# Patient Record
Sex: FEMALE | Race: WHITE | NOT HISPANIC OR LATINO | Employment: FULL TIME | URBAN - METROPOLITAN AREA
[De-identification: names, ages, dates, MRNs, and addresses within clinical notes are randomized per-mention and may not be internally consistent; named-entity substitution may affect disease eponyms.]

---

## 2023-12-13 ENCOUNTER — APPOINTMENT (OUTPATIENT)
Dept: RADIOLOGY | Facility: HOSPITAL | Age: 58
DRG: 308 | End: 2023-12-13
Payer: COMMERCIAL

## 2023-12-13 ENCOUNTER — HOSPITAL ENCOUNTER (INPATIENT)
Facility: HOSPITAL | Age: 58
LOS: 1 days | Discharge: HOME | DRG: 308 | End: 2023-12-15
Attending: STUDENT IN AN ORGANIZED HEALTH CARE EDUCATION/TRAINING PROGRAM | Admitting: INTERNAL MEDICINE
Payer: COMMERCIAL

## 2023-12-13 DIAGNOSIS — I63.532 CEREBROVASCULAR ACCIDENT (CVA) DUE TO OCCLUSION OF LEFT POSTERIOR CEREBRAL ARTERY (MULTI): ICD-10-CM

## 2023-12-13 DIAGNOSIS — I48.91 NEW ONSET ATRIAL FIBRILLATION (MULTI): Primary | ICD-10-CM

## 2023-12-13 DIAGNOSIS — R07.9 CHEST PAIN, UNSPECIFIED TYPE: ICD-10-CM

## 2023-12-13 DIAGNOSIS — I48.91 NEW ONSET A-FIB (MULTI): ICD-10-CM

## 2023-12-13 PROBLEM — R79.89 ELEVATED BRAIN NATRIURETIC PEPTIDE (BNP) LEVEL: Status: ACTIVE | Noted: 2023-12-13

## 2023-12-13 PROBLEM — R03.0 PRE-HYPERTENSION: Status: ACTIVE | Noted: 2023-12-13

## 2023-12-13 PROBLEM — E87.1 HYPONATREMIA: Status: ACTIVE | Noted: 2023-12-13

## 2023-12-13 PROBLEM — I10 HTN (HYPERTENSION): Status: ACTIVE | Noted: 2023-12-13

## 2023-12-13 PROBLEM — R74.8 ELEVATED LIVER ENZYMES: Status: ACTIVE | Noted: 2023-12-13

## 2023-12-13 LAB
ALBUMIN SERPL BCP-MCNC: 4.4 G/DL (ref 3.4–5)
ALP SERPL-CCNC: 93 U/L (ref 33–136)
ALT SERPL W P-5'-P-CCNC: 11 U/L (ref 7–45)
ANION GAP SERPL CALC-SCNC: 18 MMOL/L (ref 10–20)
AST SERPL W P-5'-P-CCNC: 22 U/L (ref 9–39)
BASOPHILS # BLD AUTO: 0.08 X10*3/UL (ref 0–0.1)
BASOPHILS NFR BLD AUTO: 0.9 %
BILIRUB SERPL-MCNC: 0.8 MG/DL (ref 0–1.2)
BNP SERPL-MCNC: 321 PG/ML (ref 0–99)
BUN SERPL-MCNC: 10 MG/DL (ref 6–23)
CALCIUM SERPL-MCNC: 9.1 MG/DL (ref 8.6–10.3)
CARDIAC TROPONIN I PNL SERPL HS: 6 NG/L (ref 0–13)
CARDIAC TROPONIN I PNL SERPL HS: 7 NG/L (ref 0–13)
CHLORIDE SERPL-SCNC: 95 MMOL/L (ref 98–107)
CO2 SERPL-SCNC: 22 MMOL/L (ref 21–32)
CREAT SERPL-MCNC: 0.59 MG/DL (ref 0.5–1.05)
EOSINOPHIL # BLD AUTO: 0.11 X10*3/UL (ref 0–0.7)
EOSINOPHIL NFR BLD AUTO: 1.2 %
ERYTHROCYTE [DISTWIDTH] IN BLOOD BY AUTOMATED COUNT: 12.2 % (ref 11.5–14.5)
GFR SERPL CREATININE-BSD FRML MDRD: >90 ML/MIN/1.73M*2
GLUCOSE SERPL-MCNC: 85 MG/DL (ref 74–99)
HCT VFR BLD AUTO: 54.8 % (ref 36–46)
HGB BLD-MCNC: 18.4 G/DL (ref 12–16)
IMM GRANULOCYTES # BLD AUTO: 0.03 X10*3/UL (ref 0–0.7)
IMM GRANULOCYTES NFR BLD AUTO: 0.3 % (ref 0–0.9)
INR PPP: 1.1 (ref 0.9–1.1)
LYMPHOCYTES # BLD AUTO: 2.39 X10*3/UL (ref 1.2–4.8)
LYMPHOCYTES NFR BLD AUTO: 25.4 %
MCH RBC QN AUTO: 32 PG (ref 26–34)
MCHC RBC AUTO-ENTMCNC: 33.6 G/DL (ref 32–36)
MCV RBC AUTO: 95 FL (ref 80–100)
MONOCYTES # BLD AUTO: 0.97 X10*3/UL (ref 0.1–1)
MONOCYTES NFR BLD AUTO: 10.3 %
NEUTROPHILS # BLD AUTO: 5.83 X10*3/UL (ref 1.2–7.7)
NEUTROPHILS NFR BLD AUTO: 61.9 %
NRBC BLD-RTO: 0 /100 WBCS (ref 0–0)
PLATELET # BLD AUTO: 304 X10*3/UL (ref 150–450)
POTASSIUM SERPL-SCNC: 3.7 MMOL/L (ref 3.5–5.3)
PROT SERPL-MCNC: 7.9 G/DL (ref 6.4–8.2)
PROTHROMBIN TIME: 12 SECONDS (ref 9.8–12.8)
RBC # BLD AUTO: 5.75 X10*6/UL (ref 4–5.2)
SODIUM SERPL-SCNC: 131 MMOL/L (ref 136–145)
WBC # BLD AUTO: 9.4 X10*3/UL (ref 4.4–11.3)

## 2023-12-13 PROCEDURE — 96372 THER/PROPH/DIAG INJ SC/IM: CPT

## 2023-12-13 PROCEDURE — 84484 ASSAY OF TROPONIN QUANT: CPT | Performed by: STUDENT IN AN ORGANIZED HEALTH CARE EDUCATION/TRAINING PROGRAM

## 2023-12-13 PROCEDURE — 2550000001 HC RX 255 CONTRASTS: Performed by: STUDENT IN AN ORGANIZED HEALTH CARE EDUCATION/TRAINING PROGRAM

## 2023-12-13 PROCEDURE — 99285 EMERGENCY DEPT VISIT HI MDM: CPT | Mod: 25,27 | Performed by: STUDENT IN AN ORGANIZED HEALTH CARE EDUCATION/TRAINING PROGRAM

## 2023-12-13 PROCEDURE — 36415 COLL VENOUS BLD VENIPUNCTURE: CPT | Performed by: NURSE PRACTITIONER

## 2023-12-13 PROCEDURE — 2500000004 HC RX 250 GENERAL PHARMACY W/ HCPCS (ALT 636 FOR OP/ED): Performed by: STUDENT IN AN ORGANIZED HEALTH CARE EDUCATION/TRAINING PROGRAM

## 2023-12-13 PROCEDURE — 71275 CT ANGIOGRAPHY CHEST: CPT | Performed by: RADIOLOGY

## 2023-12-13 PROCEDURE — 85025 COMPLETE CBC W/AUTO DIFF WBC: CPT | Performed by: STUDENT IN AN ORGANIZED HEALTH CARE EDUCATION/TRAINING PROGRAM

## 2023-12-13 PROCEDURE — G0378 HOSPITAL OBSERVATION PER HR: HCPCS

## 2023-12-13 PROCEDURE — 99285 EMERGENCY DEPT VISIT HI MDM: CPT | Performed by: STUDENT IN AN ORGANIZED HEALTH CARE EDUCATION/TRAINING PROGRAM

## 2023-12-13 PROCEDURE — 71045 X-RAY EXAM CHEST 1 VIEW: CPT | Performed by: RADIOLOGY

## 2023-12-13 PROCEDURE — 71275 CT ANGIOGRAPHY CHEST: CPT

## 2023-12-13 PROCEDURE — 83880 ASSAY OF NATRIURETIC PEPTIDE: CPT | Performed by: STUDENT IN AN ORGANIZED HEALTH CARE EDUCATION/TRAINING PROGRAM

## 2023-12-13 PROCEDURE — 85610 PROTHROMBIN TIME: CPT | Performed by: STUDENT IN AN ORGANIZED HEALTH CARE EDUCATION/TRAINING PROGRAM

## 2023-12-13 PROCEDURE — 80053 COMPREHEN METABOLIC PANEL: CPT | Performed by: STUDENT IN AN ORGANIZED HEALTH CARE EDUCATION/TRAINING PROGRAM

## 2023-12-13 PROCEDURE — 36415 COLL VENOUS BLD VENIPUNCTURE: CPT | Performed by: STUDENT IN AN ORGANIZED HEALTH CARE EDUCATION/TRAINING PROGRAM

## 2023-12-13 PROCEDURE — 71045 X-RAY EXAM CHEST 1 VIEW: CPT | Mod: FY

## 2023-12-13 PROCEDURE — 2500000004 HC RX 250 GENERAL PHARMACY W/ HCPCS (ALT 636 FOR OP/ED): Performed by: NURSE PRACTITIONER

## 2023-12-13 PROCEDURE — 84484 ASSAY OF TROPONIN QUANT: CPT | Performed by: NURSE PRACTITIONER

## 2023-12-13 RX ORDER — SODIUM CHLORIDE 9 MG/ML
75 INJECTION, SOLUTION INTRAVENOUS CONTINUOUS
Status: DISCONTINUED | OUTPATIENT
Start: 2023-12-13 | End: 2023-12-14

## 2023-12-13 RX ORDER — TALC
3 POWDER (GRAM) TOPICAL DAILY PRN
Status: DISCONTINUED | OUTPATIENT
Start: 2023-12-13 | End: 2023-12-15 | Stop reason: HOSPADM

## 2023-12-13 RX ORDER — POLYETHYLENE GLYCOL 3350 17 G/17G
17 POWDER, FOR SOLUTION ORAL DAILY PRN
Status: DISCONTINUED | OUTPATIENT
Start: 2023-12-13 | End: 2023-12-15 | Stop reason: HOSPADM

## 2023-12-13 RX ORDER — ENOXAPARIN SODIUM 100 MG/ML
1 INJECTION SUBCUTANEOUS EVERY 12 HOURS
Status: DISCONTINUED | OUTPATIENT
Start: 2023-12-13 | End: 2023-12-15

## 2023-12-13 RX ADMIN — ENOXAPARIN SODIUM 70 MG: 100 INJECTION SUBCUTANEOUS at 19:45

## 2023-12-13 RX ADMIN — IOHEXOL 54 ML: 350 INJECTION, SOLUTION INTRAVENOUS at 19:14

## 2023-12-13 RX ADMIN — SODIUM CHLORIDE 75 ML/HR: 9 INJECTION, SOLUTION INTRAVENOUS at 23:34

## 2023-12-13 SDOH — SOCIAL STABILITY: SOCIAL INSECURITY: DO YOU FEEL ANYONE HAS EXPLOITED OR TAKEN ADVANTAGE OF YOU FINANCIALLY OR OF YOUR PERSONAL PROPERTY?: NO

## 2023-12-13 SDOH — SOCIAL STABILITY: SOCIAL INSECURITY: ARE YOU OR HAVE YOU BEEN THREATENED OR ABUSED PHYSICALLY, EMOTIONALLY, OR SEXUALLY BY ANYONE?: NO

## 2023-12-13 SDOH — SOCIAL STABILITY: SOCIAL INSECURITY: HAS ANYONE EVER THREATENED TO HURT YOUR FAMILY OR YOUR PETS?: NO

## 2023-12-13 SDOH — SOCIAL STABILITY: SOCIAL INSECURITY: WERE YOU ABLE TO COMPLETE ALL THE BEHAVIORAL HEALTH SCREENINGS?: YES

## 2023-12-13 SDOH — SOCIAL STABILITY: SOCIAL INSECURITY: DO YOU FEEL UNSAFE GOING BACK TO THE PLACE WHERE YOU ARE LIVING?: NO

## 2023-12-13 SDOH — SOCIAL STABILITY: SOCIAL INSECURITY: HAVE YOU HAD THOUGHTS OF HARMING ANYONE ELSE?: NO

## 2023-12-13 SDOH — SOCIAL STABILITY: SOCIAL INSECURITY: ARE THERE ANY APPARENT SIGNS OF INJURIES/BEHAVIORS THAT COULD BE RELATED TO ABUSE/NEGLECT?: NO

## 2023-12-13 SDOH — SOCIAL STABILITY: SOCIAL INSECURITY: ABUSE: ADULT

## 2023-12-13 SDOH — SOCIAL STABILITY: SOCIAL INSECURITY: DOES ANYONE TRY TO KEEP YOU FROM HAVING/CONTACTING OTHER FRIENDS OR DOING THINGS OUTSIDE YOUR HOME?: NO

## 2023-12-13 ASSESSMENT — ACTIVITIES OF DAILY LIVING (ADL)
LACK_OF_TRANSPORTATION: NO
ADEQUATE_TO_COMPLETE_ADL: YES
WALKS IN HOME: INDEPENDENT
HEARING - LEFT EAR: FUNCTIONAL
PATIENT'S MEMORY ADEQUATE TO SAFELY COMPLETE DAILY ACTIVITIES?: YES
BATHING: INDEPENDENT
JUDGMENT_ADEQUATE_SAFELY_COMPLETE_DAILY_ACTIVITIES: YES
FEEDING YOURSELF: INDEPENDENT
DRESSING YOURSELF: INDEPENDENT
HEARING - RIGHT EAR: FUNCTIONAL
TOILETING: INDEPENDENT
GROOMING: INDEPENDENT

## 2023-12-13 ASSESSMENT — PAIN SCALES - GENERAL
PAINLEVEL_OUTOF10: 4
PAINLEVEL_OUTOF10: 0 - NO PAIN
PAINLEVEL_OUTOF10: 3

## 2023-12-13 ASSESSMENT — COGNITIVE AND FUNCTIONAL STATUS - GENERAL
MOBILITY SCORE: 24
PATIENT BASELINE BEDBOUND: NO
DAILY ACTIVITIY SCORE: 24

## 2023-12-13 ASSESSMENT — PAIN DESCRIPTION - PAIN TYPE: TYPE: ACUTE PAIN

## 2023-12-13 ASSESSMENT — COLUMBIA-SUICIDE SEVERITY RATING SCALE - C-SSRS
1. IN THE PAST MONTH, HAVE YOU WISHED YOU WERE DEAD OR WISHED YOU COULD GO TO SLEEP AND NOT WAKE UP?: NO
2. HAVE YOU ACTUALLY HAD ANY THOUGHTS OF KILLING YOURSELF?: NO
1. IN THE PAST MONTH, HAVE YOU WISHED YOU WERE DEAD OR WISHED YOU COULD GO TO SLEEP AND NOT WAKE UP?: NO
6. HAVE YOU EVER DONE ANYTHING, STARTED TO DO ANYTHING, OR PREPARED TO DO ANYTHING TO END YOUR LIFE?: NO
2. HAVE YOU ACTUALLY HAD ANY THOUGHTS OF KILLING YOURSELF?: NO
6. HAVE YOU EVER DONE ANYTHING, STARTED TO DO ANYTHING, OR PREPARED TO DO ANYTHING TO END YOUR LIFE?: NO

## 2023-12-13 ASSESSMENT — PAIN - FUNCTIONAL ASSESSMENT
PAIN_FUNCTIONAL_ASSESSMENT: 0-10

## 2023-12-13 ASSESSMENT — PAIN DESCRIPTION - LOCATION: LOCATION: HEAD

## 2023-12-13 ASSESSMENT — LIFESTYLE VARIABLES
PRESCIPTION_ABUSE_PAST_12_MONTHS: NO
EVER FELT BAD OR GUILTY ABOUT YOUR DRINKING: NO
AUDIT-C TOTAL SCORE: 3
REASON UNABLE TO ASSESS: NO
AUDIT TOTAL SCORE: 3
HOW OFTEN DURING THE LAST YEAR HAVE YOU FAILED TO DO WHAT WAS NORMALLY EXPECTED FROM YOU BECAUSE OF DRINKING: NEVER
HOW OFTEN DURING THE LAST YEAR HAVE YOU BEEN UNABLE TO REMEMBER WHAT HAPPENED THE NIGHT BEFORE BECAUSE YOU HAD BEEN DRINKING: NEVER
HOW OFTEN DO YOU HAVE A DRINK CONTAINING ALCOHOL: 2-3 TIMES A WEEK
HAVE YOU OR SOMEONE ELSE BEEN INJURED AS A RESULT OF YOUR DRINKING: NO
HOW OFTEN DURING THE LAST YEAR HAVE YOU HAD A FEELING OF GUILT OR REMORSE AFTER DRINKING: NEVER
HOW OFTEN DO YOU HAVE 6 OR MORE DRINKS ON ONE OCCASION: NEVER
AUDIT TOTAL SCORE: 0
EVER HAD A DRINK FIRST THING IN THE MORNING TO STEADY YOUR NERVES TO GET RID OF A HANGOVER: NO
SUBSTANCE_ABUSE_PAST_12_MONTHS: NO
HAVE YOU EVER FELT YOU SHOULD CUT DOWN ON YOUR DRINKING: NO
HOW OFTEN DURING THE LAST YEAR HAVE YOU FOUND THAT YOU WERE NOT ABLE TO STOP DRINKING ONCE YOU HAD STARTED: NEVER
HOW OFTEN DURING THE LAST YEAR HAVE YOU NEEDED AN ALCOHOLIC DRINK FIRST THING IN THE MORNING TO GET YOURSELF GOING AFTER A NIGHT OF HEAVY DRINKING: NEVER
SKIP TO QUESTIONS 9-10: 1
AUDIT-C TOTAL SCORE: 3
HAVE PEOPLE ANNOYED YOU BY CRITICIZING YOUR DRINKING: NO
HOW MANY STANDARD DRINKS CONTAINING ALCOHOL DO YOU HAVE ON A TYPICAL DAY: 1 OR 2
HAS A RELATIVE, FRIEND, DOCTOR, OR ANOTHER HEALTH PROFESSIONAL EXPRESSED CONCERN ABOUT YOUR DRINKING OR SUGGESTED YOU CUT DOWN: NO

## 2023-12-13 ASSESSMENT — PAIN DESCRIPTION - DESCRIPTORS: DESCRIPTORS: PRESSURE

## 2023-12-13 ASSESSMENT — PATIENT HEALTH QUESTIONNAIRE - PHQ9
2. FEELING DOWN, DEPRESSED OR HOPELESS: NOT AT ALL
SUM OF ALL RESPONSES TO PHQ9 QUESTIONS 1 & 2: 0
1. LITTLE INTEREST OR PLEASURE IN DOING THINGS: NOT AT ALL

## 2023-12-13 ASSESSMENT — PAIN DESCRIPTION - ONSET: ONSET: GRADUAL

## 2023-12-13 ASSESSMENT — PAIN DESCRIPTION - PROGRESSION: CLINICAL_PROGRESSION: NOT CHANGED

## 2023-12-13 ASSESSMENT — PAIN DESCRIPTION - FREQUENCY: FREQUENCY: INTERMITTENT

## 2023-12-14 ENCOUNTER — APPOINTMENT (OUTPATIENT)
Dept: RADIOLOGY | Facility: HOSPITAL | Age: 58
DRG: 308 | End: 2023-12-14
Payer: COMMERCIAL

## 2023-12-14 ENCOUNTER — APPOINTMENT (OUTPATIENT)
Dept: CARDIOLOGY | Facility: HOSPITAL | Age: 58
DRG: 308 | End: 2023-12-14
Payer: COMMERCIAL

## 2023-12-14 PROBLEM — F17.200 NICOTINE DEPENDENCE: Status: ACTIVE | Noted: 2023-12-14

## 2023-12-14 PROBLEM — I48.91 NEW ONSET ATRIAL FIBRILLATION (MULTI): Status: ACTIVE | Noted: 2023-12-14

## 2023-12-14 PROBLEM — H02.59 EXCESSIVE INVOLUNTARY BLINKING: Status: ACTIVE | Noted: 2023-12-14

## 2023-12-14 PROBLEM — R22.0 MASS OF SUBMANDIBULAR REGION: Status: ACTIVE | Noted: 2023-12-14

## 2023-12-14 PROBLEM — H53.9 VISUAL DISTURBANCE: Status: ACTIVE | Noted: 2023-12-14

## 2023-12-14 PROBLEM — R51.9 HEADACHE: Status: ACTIVE | Noted: 2023-12-14

## 2023-12-14 LAB
ANION GAP SERPL CALC-SCNC: 13 MMOL/L (ref 10–20)
AORTIC VALVE PEAK VELOCITY: 1.01
AV PEAK GRADIENT: 4.1
AVA (PEAK VEL): 2.29
BUN SERPL-MCNC: 10 MG/DL (ref 6–23)
CALCIUM SERPL-MCNC: 8.7 MG/DL (ref 8.6–10.3)
CHLORIDE SERPL-SCNC: 104 MMOL/L (ref 98–107)
CHOLEST SERPL-MCNC: 148 MG/DL (ref 0–199)
CHOLESTEROL/HDL RATIO: 3.9
CO2 SERPL-SCNC: 23 MMOL/L (ref 21–32)
CREAT SERPL-MCNC: 0.56 MG/DL (ref 0.5–1.05)
EJECTION FRACTION APICAL 4 CHAMBER: 66.4
EJECTION FRACTION: 61
ERYTHROCYTE [DISTWIDTH] IN BLOOD BY AUTOMATED COUNT: 12.3 % (ref 11.5–14.5)
EST. AVERAGE GLUCOSE BLD GHB EST-MCNC: 97 MG/DL
GFR SERPL CREATININE-BSD FRML MDRD: >90 ML/MIN/1.73M*2
GLUCOSE SERPL-MCNC: 81 MG/DL (ref 74–99)
HBA1C MFR BLD: 5 %
HCT VFR BLD AUTO: 49.3 % (ref 36–46)
HDLC SERPL-MCNC: 38.3 MG/DL
HGB BLD-MCNC: 16.7 G/DL (ref 12–16)
LDLC SERPL CALC-MCNC: 93 MG/DL
LEFT VENTRICLE INTERNAL DIMENSION DIASTOLE: 4.5 (ref 3.5–6)
LEFT VENTRICULAR OUTFLOW TRACT DIAMETER: 2.1
MAGNESIUM SERPL-MCNC: 2.14 MG/DL (ref 1.6–2.4)
MCH RBC QN AUTO: 32.1 PG (ref 26–34)
MCHC RBC AUTO-ENTMCNC: 33.9 G/DL (ref 32–36)
MCV RBC AUTO: 95 FL (ref 80–100)
MITRAL VALVE E/E' RATIO: 12.07
NON HDL CHOLESTEROL: 110 MG/DL (ref 0–149)
NRBC BLD-RTO: 0 /100 WBCS (ref 0–0)
PHOSPHATE SERPL-MCNC: 4.2 MG/DL (ref 2.5–4.9)
PLATELET # BLD AUTO: 251 X10*3/UL (ref 150–450)
POTASSIUM SERPL-SCNC: 4 MMOL/L (ref 3.5–5.3)
RBC # BLD AUTO: 5.2 X10*6/UL (ref 4–5.2)
RIGHT VENTRICLE FREE WALL PEAK S': 7.07
RIGHT VENTRICLE PEAK SYSTOLIC PRESSURE: 19.6
SODIUM SERPL-SCNC: 136 MMOL/L (ref 136–145)
T4 FREE SERPL-MCNC: 1.25 NG/DL (ref 0.61–1.12)
TRICUSPID ANNULAR PLANE SYSTOLIC EXCURSION: 1.6
TRIGL SERPL-MCNC: 82 MG/DL (ref 0–149)
TSH SERPL-ACNC: 1.95 MIU/L (ref 0.44–3.98)
VLDL: 16 MG/DL (ref 0–40)
WBC # BLD AUTO: 6.8 X10*3/UL (ref 4.4–11.3)

## 2023-12-14 PROCEDURE — 83036 HEMOGLOBIN GLYCOSYLATED A1C: CPT | Mod: STJLAB | Performed by: NURSE PRACTITIONER

## 2023-12-14 PROCEDURE — 70450 CT HEAD/BRAIN W/O DYE: CPT

## 2023-12-14 PROCEDURE — 93306 TTE W/DOPPLER COMPLETE: CPT | Performed by: INTERNAL MEDICINE

## 2023-12-14 PROCEDURE — 83735 ASSAY OF MAGNESIUM: CPT | Performed by: NURSE PRACTITIONER

## 2023-12-14 PROCEDURE — 99223 1ST HOSP IP/OBS HIGH 75: CPT | Performed by: NURSE PRACTITIONER

## 2023-12-14 PROCEDURE — 84100 ASSAY OF PHOSPHORUS: CPT | Performed by: NURSE PRACTITIONER

## 2023-12-14 PROCEDURE — 70551 MRI BRAIN STEM W/O DYE: CPT

## 2023-12-14 PROCEDURE — 70547 MR ANGIOGRAPHY NECK W/O DYE: CPT | Performed by: RADIOLOGY

## 2023-12-14 PROCEDURE — 93306 TTE W/DOPPLER COMPLETE: CPT

## 2023-12-14 PROCEDURE — 99223 1ST HOSP IP/OBS HIGH 75: CPT

## 2023-12-14 PROCEDURE — 2060000001 HC INTERMEDIATE ICU ROOM DAILY

## 2023-12-14 PROCEDURE — 84439 ASSAY OF FREE THYROXINE: CPT | Performed by: NURSE PRACTITIONER

## 2023-12-14 PROCEDURE — 70450 CT HEAD/BRAIN W/O DYE: CPT | Performed by: RADIOLOGY

## 2023-12-14 PROCEDURE — 70547 MR ANGIOGRAPHY NECK W/O DYE: CPT

## 2023-12-14 PROCEDURE — 70544 MR ANGIOGRAPHY HEAD W/O DYE: CPT

## 2023-12-14 PROCEDURE — 80061 LIPID PANEL: CPT | Performed by: NURSE PRACTITIONER

## 2023-12-14 PROCEDURE — 84443 ASSAY THYROID STIM HORMONE: CPT | Performed by: NURSE PRACTITIONER

## 2023-12-14 PROCEDURE — 85027 COMPLETE CBC AUTOMATED: CPT | Performed by: NURSE PRACTITIONER

## 2023-12-14 PROCEDURE — 99223 1ST HOSP IP/OBS HIGH 75: CPT | Performed by: INTERNAL MEDICINE

## 2023-12-14 PROCEDURE — 70544 MR ANGIOGRAPHY HEAD W/O DYE: CPT | Performed by: RADIOLOGY

## 2023-12-14 PROCEDURE — 70551 MRI BRAIN STEM W/O DYE: CPT | Performed by: RADIOLOGY

## 2023-12-14 PROCEDURE — 36415 COLL VENOUS BLD VENIPUNCTURE: CPT | Performed by: NURSE PRACTITIONER

## 2023-12-14 PROCEDURE — 80048 BASIC METABOLIC PNL TOTAL CA: CPT | Performed by: NURSE PRACTITIONER

## 2023-12-14 PROCEDURE — 2500000001 HC RX 250 WO HCPCS SELF ADMINISTERED DRUGS (ALT 637 FOR MEDICARE OP)

## 2023-12-14 PROCEDURE — 93005 ELECTROCARDIOGRAM TRACING: CPT

## 2023-12-14 RX ORDER — IBUPROFEN 200 MG
1 TABLET ORAL DAILY
Status: DISCONTINUED | OUTPATIENT
Start: 2023-12-14 | End: 2023-12-15 | Stop reason: HOSPADM

## 2023-12-14 RX ORDER — ATORVASTATIN CALCIUM 40 MG/1
40 TABLET, FILM COATED ORAL NIGHTLY
Status: CANCELLED | OUTPATIENT
Start: 2023-12-14

## 2023-12-14 RX ORDER — ATORVASTATIN CALCIUM 40 MG/1
40 TABLET, FILM COATED ORAL NIGHTLY
Status: DISCONTINUED | OUTPATIENT
Start: 2023-12-14 | End: 2023-12-15 | Stop reason: HOSPADM

## 2023-12-14 RX ADMIN — ATORVASTATIN CALCIUM 40 MG: 40 TABLET, FILM COATED ORAL at 20:55

## 2023-12-14 ASSESSMENT — VISUAL ACUITY: VA_NORMAL: 1

## 2023-12-14 ASSESSMENT — PAIN - FUNCTIONAL ASSESSMENT
PAIN_FUNCTIONAL_ASSESSMENT: 0-10

## 2023-12-14 ASSESSMENT — PAIN SCALES - GENERAL
PAINLEVEL_OUTOF10: 0 - NO PAIN

## 2023-12-14 ASSESSMENT — COGNITIVE AND FUNCTIONAL STATUS - GENERAL
DAILY ACTIVITIY SCORE: 24
MOBILITY SCORE: 24

## 2023-12-14 NOTE — NURSING NOTE
Report received from [facility].  Pt transferred via  into 2100.  Denies chest pain or sob.  Monitor shows afib 70's.  Oriented to room and unit.  Friend at bedside.  Pt ambulates in room, gait steady.  Call light in reach.

## 2023-12-14 NOTE — ED PROVIDER NOTES
HPI   Chief Complaint   Patient presents with    Irregular Heart Beat    Headache     Pt was sent by  for irregular heart beat, new onset AFIB, HR controlled, denies sob or cp       58-year-old female with past medical history significant for prior DVTs who presents to the emergency department with chest discomfort and a headache.  She states that she went to urgent care yesterday to be evaluated for headache that is been ongoing for the last several days, and she was told to come to the ER there were concerns for something wrong with her heart.  She states that she has some intermittent discomfort, but does not feel like her heart is racing.  She is not on a blood thinner although she has been on 1 in the past for prior DVTs.  Endorses smoking, 1 pack/day.  No difficulty breathing, no new vision changes, no nausea, no vomiting, no diarrhea or constipation, and no belly pain.                          Helen Coma Scale Score: 15                  Patient History   Past Medical History:   Diagnosis Date    Closed fracture of upper end of humerus 11/03/2015    Dry eyes     Elevated liver enzymes 12/13/2023    Intermittent exotropia of right eye     Migraines     Pre-hypertension 12/13/2023    Scoliosis     Shoulder fracture, left     Stroke (CMS/HCC)     CVA/TIA     Past Surgical History:   Procedure Laterality Date    ECTOPIC PREGNANCY SURGERY      EYE SURGERY Right     x2    FRACTURE SURGERY      left wrist    HERNIA REPAIR       Family History   Problem Relation Name Age of Onset    Cancer Mother      Heart attack Father          dies at the age of 50's    No Known Problems Sister      No Known Problems Son      Other (borderline diabetic) Maternal Grandfather      Heart attack Paternal Grandmother      Stroke Paternal Grandfather       Social History     Tobacco Use    Smoking status: Every Day     Packs/day: .5     Types: Cigarettes    Smokeless tobacco: Never   Vaping Use    Vaping Use: Not on file   Substance  Use Topics    Alcohol use: Not Currently     Comment: 3x per week 2-3 cans of beer    Drug use: Never       Physical Exam   ED Triage Vitals [12/13/23 1529]   Temp Heart Rate Resp BP   36 °C (96.8 °F) 82 16 (!) 168/91      SpO2 Temp Source Heart Rate Source Patient Position   99 % Temporal Monitor Sitting      BP Location FiO2 (%)     Left arm --       Physical Exam  Vitals and nursing note reviewed.   Constitutional:       General: She is not in acute distress.     Appearance: She is well-developed.   HENT:      Head: Normocephalic and atraumatic.   Eyes:      Conjunctiva/sclera: Conjunctivae normal.      Comments: Strabismus of right eye   Cardiovascular:      Rate and Rhythm: Normal rate. Rhythm irregular.      Heart sounds: No murmur heard.  Pulmonary:      Effort: Pulmonary effort is normal. No respiratory distress.      Breath sounds: Normal breath sounds.   Abdominal:      Palpations: Abdomen is soft.      Tenderness: There is no abdominal tenderness.   Musculoskeletal:         General: No swelling.      Cervical back: Neck supple.   Skin:     General: Skin is warm and dry.      Capillary Refill: Capillary refill takes less than 2 seconds.   Neurological:      Mental Status: She is alert.   Psychiatric:         Mood and Affect: Mood normal.         ED Course & MDM   Diagnoses as of 12/14/23 0423   New onset atrial fibrillation (CMS/HCC)       Medical Decision Making  History obtained from: Patient    External records reviewed: I reviewed external records including outpatient, PCP records, and prior discharge summaries    ED Course: Cardiac workup for the patient and new onset A-fib.  Given her first dose of therapeutic Lovenox 1 mg/kg.  EKG shows atrial fibrillation with narrow complex conduction.No significant electrolyte derangements, no elevation in troponin, and her TSH is within normal limits.  BNP elevated to over 300, however the patient has no shortness of breath.  She is hemoconcentrated 18.4  hemoglobin.  PE study was negative.  She will be admitted for further evaluation for new onset atrial fibrillation.    I have reviewed this case with the ED attending physician, and the attending agrees with the plan. Patient or family was counselled regarding labs, imaging, likely diagnosis, and plan. All questions were answered.     Teena Cruz DO  PGY-4, emergency medicine    The above documentation was completed with the use of speech recognition software. It may contain dictation errors secondary to limitations of the software.          Procedure  Procedures     Teena Cruz DO  Resident  12/14/23 0427

## 2023-12-14 NOTE — PROGRESS NOTES
Spiritual Care Visit    Clinical Encounter Type  Visited With: Patient  Routine Visit: Introduction  Continue Visiting: No                                            Taxonomy  Intended Effects: Promote sense of peace, Preserve dignity and respect  Methods: Offer spiritual/Yazidi support  Interventions: [place], Share words of hope and inspiration    Patient is a Taoist.  Patient shared she is inactive in her zion because her Jainism closed and she works on Sundays.   was a supportive presence.

## 2023-12-14 NOTE — PROGRESS NOTES
12/14/23 1118   Discharge Planning   Living Arrangements Children   Support Systems Children   Type of Residence Private residence   Home or Post Acute Services None   Patient expects to be discharged to: home   Does the patient need discharge transport arranged? No     Met with patient at bedside. Admitted for new onset Afib. Pt lives with adult son and grandchildren and was independent PTA with no HHC or DME. Pt still working and is able to obtain medications. Plans to return home with no new discharge needs. Family will provide transport.

## 2023-12-14 NOTE — CARE PLAN
Problem: Fall/Injury  Goal: Not fall by end of shift  Outcome: Progressing  Goal: Be free from injury by end of the shift  Outcome: Progressing  Goal: Verbalize understanding of personal risk factors for fall in the hospital  Outcome: Progressing  Goal: Verbalize understanding of risk factor reduction measures to prevent injury from fall in the home  Outcome: Progressing  Goal: Use assistive devices by end of the shift  Outcome: Progressing  Goal: Pace activities to prevent fatigue by end of the shift  Outcome: Progressing     Problem: Pain  Goal: My pain/discomfort is manageable  Outcome: Progressing     Problem: Safety  Goal: Patient will be injury free during hospitalization  Outcome: Progressing  Goal: I will remain free of falls  Outcome: Progressing     Problem: Daily Care  Goal: Daily care needs are met  Outcome: Progressing     Problem: Psychosocial Needs  Goal: Demonstrates ability to cope with hospitalization/illness  Outcome: Progressing  Goal: Collaborate with me, my family, and caregiver to identify my specific goals  Outcome: Progressing  Flowsheets (Taken 12/13/2023 0747)  Cultural Requests During Hospitalization: none  Spiritual Requests During Hospitalization: none     Problem: Discharge Barriers  Goal: My discharge needs are met  Outcome: Progressing   The patient's goals for the shift include rest    The clinical goals for the shift include Remain hemodynamically stable

## 2023-12-14 NOTE — CONSULTS
Reason For Consult  Stroke    History Of Present Illness  Tomasa Gutierres is a 58 y.o. 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    Patient reported that 1 week ago that his last Thursday she started experiencing headaches that were very similar to her usual migraines and it started suddenly after she got out of her shower in the morning.  The headache was bitemporal, throbbing, 5-6/10 in intensity, associated with visual auras and photophobia.  Her usual migraines are relieved with 1-2 low-dose aspirins.  She took 2 low-dose aspirin on Thursday and 2 to low-dose aspirin on Friday however this did not relieve her headache this time.  The headaches gradually slowed down and became intermittent till now.  Out one of her prior to the onset of her headache she had a mechanical fall however did not hit her head but hit her left wrist and left hip.  She was able to get up by herself and continue with her daily activities.  Patient also reported that since last Thursday she feels that her right-sided peripheral vision is a little blurry.  During this entire time patient has a decreased appetite.  However she denied any swallowing issues.  On Tuesday she went to urgent care and was found to be in A-fib and recommended to go to the ER.  She went to ER at [place] however due to long waiting time decided to go to [facility].    Patient denies any loss of consciousness, weakness/numbness/tingling of any of her extremities, bowel or bladder issues.  She was able to walk and continue her daily activities.    Hospital course:  Upon presentation to the hospital patient was afebrile, nontachycardic, blood  pressure initially was 168/91, satting well on room air.  Current blood pressure is 111/74.  EKG was suggestive of atrial fibrillation.  Labs initially patient was mildly hyponatremic of 131, repeat 136.  Lipid panel is within normal limits. A1C 5.0    CT head Noncon 12/13/2023 suggested edema in the left occipital and temporal lobe compatible with acute infarct.  There was some minimal hyperdensity at the site of the infarct, could be laminar necrosis or minimal petechial hemorrhage.  Mild encephalomalacia in the right parietal lobe noted compatible with an old infarct.    After 6 hrs-Repeat CT head noncontrast 12/14/2023 suggested abnormal hypodensity involving the cortex and white matter along the left occipital lobe extending to the left temporal lobe.  Associated mass effect with asymmetric effacement of surrounding sulci.  Findings suggestive of acute to early subacute infarction.  Possible area of petechial hemorrhagic transformation.    Interventions done-pending MRI of the brain and MRA head and neck.  Patient on every 2 hours neurochecks.  Anticoagulation and antiplatelets on hold currently-In the setting of aforementioned hemorrhagic transformation.    Echo 12/14/2023 showed negative bubble study.  No prior cardiac workup seen upon chart review      Past Medical History  Past Medical History:   Diagnosis Date    Closed fracture of upper end of humerus 11/03/2015    Dry eyes     Elevated liver enzymes 12/13/2023    Intermittent exotropia of right eye     Migraines     Pre-hypertension 12/13/2023    Scoliosis     Shoulder fracture, left     Stroke (CMS/HCC)     CVA/TIA     Surgical History  Past Surgical History:   Procedure Laterality Date    ECTOPIC PREGNANCY SURGERY      EYE SURGERY Right     x2    FRACTURE SURGERY      left wrist    HERNIA REPAIR       Social History  Social History     Tobacco Use    Smoking status: Every Day     Packs/day: .5     Types: Cigarettes    Smokeless tobacco: Never    Substance Use Topics    Alcohol use: Not Currently     Comment: 3x per week 2-3 cans of beer    Drug use: Never     Allergies  Azithromycin  No medications prior to admission.       Review of Systems    CONSTITUTIONAL: Denies fever; reports chills  NEURO: Denies difficulty walking, numbness, weakness, tingling, reports headache.   HEENT: Denies sore throat, rhinorrhea, epistaxis, changes in vision.   CARDIO: Denies chest pain, palpitations  PULM: Denies shortness of breath, cough.   GI: Denies abdominal pain, nausea, diarrhea, vomiting, melena, hematochezia.  : Denies painful urination.   MSK: Denies edema; Denies leg swelling  SKIN: Denies rash, lesions.   ENDOCRINE: Denies unexpected weight-loss.   HEME: Denies bleeding disorder.     Neurological Exam  Mental Status  Awake, alert and oriented to person, place and time. Oriented to person, place and time. Recent and remote memory are intact. Speech is normal. Language is fluent with no aphasia. Attention and concentration are normal. Fund of knowledge is appropriate for level of education.    Cranial Nerves  CN II: Visual acuity is normal. Visual fields full to confrontation.  CN III, IV, VI: Extraocular movements intact bilaterally. Normal lids and orbits bilaterally. Pupils equal round and reactive to light bilaterally. R exotropia +, questionable L sided superior quadrantanopia.  CN V: Facial sensation is normal.  CN VII: Flat L nasiolabial fold.  CN VIII: Hearing is normal.  CN IX, X: Palate elevates symmetrically  CN XI: Shoulder shrug strength is normal.  CN XII: Tongue midline without atrophy or fasciculations.    Motor  Normal muscle bulk throughout. Normal muscle tone. No pronator drift.    Sensory  Sensation is intact to light touch, pinprick, vibration and proprioception in all four extremities.    Reflexes                                            Right                      Left  Brachioradialis                    2+                          "2+  Biceps                                 2+                         2+  Triceps                                2+                         2+  Patellar                                2+                         2+  Achilles                                2+                         2+    Coordination  Right: Finger-to-nose normal. Rapid alternating movement normal. Heel-to-shin normal.    Gait  Casual gait is normal including stance, stride, and arm swing.    Constitutional: Well developed,  well appearing adult in no acute distress.   HENT: Normocephalic, atraumatic. Hearing is grossly intact. Nares grossly patent and without discharge. Mucous membranes moist.   NECK: No JVD. Patient moves neck without restriction.   CARDIO: Rhythm regular. Normal rate. No murmur, rub, or gallop. Pulses equal bilaterally in the upper and lower extremity.   PULM: Lungs clear to auscultation in all fields. No wheezes, rales, or rhonchi. No conversational dyspnea. No splinting, stridor, or accessory muscle use.    GI/: Abdomen is soft and non-tender. Normoactive bowel sounds.   EXTREMITIES: No clubbing, cyanosis, or deformity. No lower extremity edema. No tenderness along the deep venous distribution of the bilateral lower extremities  SKIN: Warm and dry. Normal turgor. No rash noted over the area of pain  PSYCH: Mood, affect, and interaction is appropriate to the setting.    Last Recorded Vitals  Blood pressure 111/74, pulse 66, temperature 36.5 °C (97.7 °F), resp. rate 20, height 1.753 m (5' 9\"), weight 72.6 kg (160 lb), SpO2 95 %.    Relevant Results  NIH Stroke Scale  1A. Level of Consciousness: Alert, Keenly Responsive  1B. Ask Month and Age: Both Questions Right  1C. Blink Eyes & Squeeze Hands: Performs Both Tasks  2. Best Gaze: Normal  3. Visual: No Visual Loss  4. Facial Palsy: Left facial droop  5A. Motor - Left Arm: No Drift  5B. Motor - Right Arm: No Drift  6A. Motor - Left Leg: No Drift  6B. Motor - Right Leg: No " Drift  7. Limb Ataxia: Absent  8. Sensory Loss: Normal  9. Best Language: No Aphasia  10. Dysarthria: Normal  11. Extinction and Inattention: No Abnormality  NIH Stroke Scale: 1    Results for orders placed or performed during the hospital encounter of 12/13/23 (from the past 24 hour(s))   CBC with Differential   Result Value Ref Range    WBC 9.4 4.4 - 11.3 x10*3/uL    nRBC 0.0 0.0 - 0.0 /100 WBCs    RBC 5.75 (H) 4.00 - 5.20 x10*6/uL    Hemoglobin 18.4 (H) 12.0 - 16.0 g/dL    Hematocrit 54.8 (H) 36.0 - 46.0 %    MCV 95 80 - 100 fL    MCH 32.0 26.0 - 34.0 pg    MCHC 33.6 32.0 - 36.0 g/dL    RDW 12.2 11.5 - 14.5 %    Platelets 304 150 - 450 x10*3/uL    Neutrophils % 61.9 40.0 - 80.0 %    Immature Granulocytes %, Automated 0.3 0.0 - 0.9 %    Lymphocytes % 25.4 13.0 - 44.0 %    Monocytes % 10.3 2.0 - 10.0 %    Eosinophils % 1.2 0.0 - 6.0 %    Basophils % 0.9 0.0 - 2.0 %    Neutrophils Absolute 5.83 1.20 - 7.70 x10*3/uL    Immature Granulocytes Absolute, Automated 0.03 0.00 - 0.70 x10*3/uL    Lymphocytes Absolute 2.39 1.20 - 4.80 x10*3/uL    Monocytes Absolute 0.97 0.10 - 1.00 x10*3/uL    Eosinophils Absolute 0.11 0.00 - 0.70 x10*3/uL    Basophils Absolute 0.08 0.00 - 0.10 x10*3/uL   Comprehensive Metabolic Panel   Result Value Ref Range    Glucose 85 74 - 99 mg/dL    Sodium 131 (L) 136 - 145 mmol/L    Potassium 3.7 3.5 - 5.3 mmol/L    Chloride 95 (L) 98 - 107 mmol/L    Bicarbonate 22 21 - 32 mmol/L    Anion Gap 18 10 - 20 mmol/L    Urea Nitrogen 10 6 - 23 mg/dL    Creatinine 0.59 0.50 - 1.05 mg/dL    eGFR >90 >60 mL/min/1.73m*2    Calcium 9.1 8.6 - 10.3 mg/dL    Albumin 4.4 3.4 - 5.0 g/dL    Alkaline Phosphatase 93 33 - 136 U/L    Total Protein 7.9 6.4 - 8.2 g/dL    AST 22 9 - 39 U/L    Bilirubin, Total 0.8 0.0 - 1.2 mg/dL    ALT 11 7 - 45 U/L   Brain Natriuretic Peptide   Result Value Ref Range     (H) 0 - 99 pg/mL   Troponin I, High Sensitivity   Result Value Ref Range    Troponin I, High Sensitivity 6 0 - 13  ng/L   Protime-INR   Result Value Ref Range    Protime 12.0 9.8 - 12.8 seconds    INR 1.1 0.9 - 1.1   Troponin I, High Sensitivity   Result Value Ref Range    Troponin I, High Sensitivity 7 0 - 13 ng/L   Magnesium   Result Value Ref Range    Magnesium 2.14 1.60 - 2.40 mg/dL   CBC   Result Value Ref Range    WBC 6.8 4.4 - 11.3 x10*3/uL    nRBC 0.0 0.0 - 0.0 /100 WBCs    RBC 5.20 4.00 - 5.20 x10*6/uL    Hemoglobin 16.7 (H) 12.0 - 16.0 g/dL    Hematocrit 49.3 (H) 36.0 - 46.0 %    MCV 95 80 - 100 fL    MCH 32.1 26.0 - 34.0 pg    MCHC 33.9 32.0 - 36.0 g/dL    RDW 12.3 11.5 - 14.5 %    Platelets 251 150 - 450 x10*3/uL   Basic metabolic panel   Result Value Ref Range    Glucose 81 74 - 99 mg/dL    Sodium 136 136 - 145 mmol/L    Potassium 4.0 3.5 - 5.3 mmol/L    Chloride 104 98 - 107 mmol/L    Bicarbonate 23 21 - 32 mmol/L    Anion Gap 13 10 - 20 mmol/L    Urea Nitrogen 10 6 - 23 mg/dL    Creatinine 0.56 0.50 - 1.05 mg/dL    eGFR >90 >60 mL/min/1.73m*2    Calcium 8.7 8.6 - 10.3 mg/dL   Phosphorus   Result Value Ref Range    Phosphorus 4.2 2.5 - 4.9 mg/dL   Lipid Panel   Result Value Ref Range    Cholesterol 148 0 - 199 mg/dL    HDL-Cholesterol 38.3 mg/dL    Cholesterol/HDL Ratio 3.9     LDL Calculated 93 <=99 mg/dL    VLDL 16 0 - 40 mg/dL    Triglycerides 82 0 - 149 mg/dL    Non HDL Cholesterol 110 0 - 149 mg/dL   TSH   Result Value Ref Range    Thyroid Stimulating Hormone 1.95 0.44 - 3.98 mIU/L   T4, free   Result Value Ref Range    Thyroxine, Free 1.25 (H) 0.61 - 1.12 ng/dL   Transthoracic Echo (TTE) Complete   Result Value Ref Range    AV pk francisco j 1.01     LV biplane EF 61     LVOT diam 2.10     MV avg E/e' ratio 12.07     Tricuspid annular plane systolic excursion 1.6     RV free wall pk S' 7.07     RVSP 19.6     LVIDd 4.50     Aortic Valve Area by Continuity of Peak Velocity 2.29     AV pk grad 4.1     LV A4C EF 66.4       CT head wo IV contrast 12/14/2023    Narrative  Interpreted By:  Heriberto Beltran,  STUDY:  CT HEAD  WO IV CONTRAST;  12/14/2023 8:06 am    INDICATION:  Signs/Symptoms:follow-up from previous CT head d/t infarct and  questionable hemorrhage.    COMPARISON:  12/14/2023 time 1:57 a.m.    ACCESSION NUMBER(S):  [id number]    ORDERING CLINICIAN:  JOSH WARE    TECHNIQUE:  Axial CT images of the head were obtained without intravenous  contrast administration.    FINDINGS:  There is again evidence of abnormal hypodensity which appears to  involve cortex and underlying white matter along the inferior left  occipital lobe extending anteriorly into the posteroinferior left  temporal lobe. There is associated mass effect with asymmetric  effacement of surrounding sulci. The findings remain suspicious for  an area of acute to early subacute infarction. There is again  evidence of a similar small amount of hyperdensity associated with  the area of abnormal hypodensity raising the possibility of a minimal  amount of associated petechial hemorrhagic transformation.    An area of encephalomalacia/gliosis is again noted within the right  parietal lobe.    Nonspecific white matter changes are again noted within cerebral  hemispheres bilaterally which while nonspecific, given the patient's  age, likely represent sequelae of small-vessel ischemic change.    The ventricular system remains nondilated.    There is no midline shift.    The visualized paranasal sinuses and mastoid air cells are clear.    No acute fracture is noted.    Impression  There is again evidence of abnormal hypodensity which appears to  involve cortex and underlying white matter along the inferior left  occipital lobe extending anteriorly into the posteroinferior left  temporal lobe. There is associated mass effect with asymmetric  effacement of surrounding sulci. The findings remain suspicious for  an area of acute to early subacute infarction. There is again  evidence of a similar small amount of hyperdensity associated with  the area of abnormal hypodensity  raising the possibility of a minimal  amount of associated petechial hemorrhagic transformation.    An area of encephalomalacia/gliosis is again noted within the right  parietal lobe.    Nonspecific white matter changes are again noted within cerebral  hemispheres bilaterally which while nonspecific, given the patient's  age, likely represent sequelae of small-vessel ischemic change.    The ventricular system remains nondilated.    MACRO:  None.    Signed by: Heriberto Beltran 12/14/2023 8:20 AM  Dictation workstation:   [id number]       Scheduled medications  [Held by provider] enoxaparin, 1 mg/kg, subcutaneous, q12h  nicotine, 1 patch, transdermal, Daily      Continuous medications     PRN medications  PRN medications: lubricating eye drops, melatonin, polyethylene glycol      Assessment/Plan   Principal Problem:    New onset a-fib (CMS/HCC)  Active Problems:    Elevated brain natriuretic peptide (BNP) level    Hyponatremia    HTN (hypertension)    Headache    Visual disturbance    Nicotine dependence    Mass of submandibular region    Excessive involuntary blinking    New onset atrial fibrillation (CMS/HCC)           Impression:  Acute Ischemic stroke- left occipital and temporal lobe with moderate hemorrhagic transformation  New onset Atrial Fibrillation  Questionable L sided superior quadrantanopsia    Recommend:  -Awaiting MRI Brain and MRA head/neck to access stroke burden. Based on the results and hemorrhage risk assessment, will recommend the appropriate antiplatelet/anticoagulation regime.  -Hold any antiplatelet/anticoagulation until further recommendations.  -Cardiology is following for A fib  -Continue neuro checks every 4 hours  -Start atorvastatin 40 mg daily  -Pt will need a formal ophthalmologic exam as an outpateint to map visual fields.    Assessment and plan discussed with attending physician,    -Jaky Byrd MD PGY-3    This note and the above recommendations are not finalized until signed by  the attending physician.

## 2023-12-14 NOTE — NURSING NOTE
Patient brought to [address] from [address] [place] [address] for HA and CT of head suspicious for CVA; q2h neuro checks. Initial neuro check and assessment completed

## 2023-12-14 NOTE — SIGNIFICANT EVENT
This provider was notified by radiology due to a critical reading on the patient's CT head without contrast.  I spoke with Dr. Zeus York regarding the findings which were significant for edema in the left occipital and temporal lobe compatible with acute infarct.  Minimal hyperdensities at the site of infarct may relate to laminar necrosis or minimal petechial hemorrhage.  Once these results were received I placed a call to Dr. Montaño and discussed the findings.  Dr. Montaño wanted a repeat CT head 6 hours after the initial which was 8 AM, MRI of brain without contrast, MRA head and neck without contrast, hold anticoagulation/Lovenox/aspirin at this time until a.m. CT results are obtained.  Patient to have neurochecks every 2 hours and to be transferred to [facility].    I went and spoke with the patient and updated her on the results of the CT, along with the plan of care.  Patient voiced understanding.  NIH scale done with result of 1+ regarding horizontal extraocular eye movements.  However, patient has right wandering eye making it difficult for assessment.  In addition, patient repeatedly blinking her eyes, which patient reports having this often for a long time.  Patient denies any headache at this time.  Will continue to monitor patient's labs, vital signs, and neurochecks performed by nursing.  Nursing to notify provider of any change in condition or concerns.

## 2023-12-14 NOTE — H&P
"History Of Present Illness  Tomasa Gutierres is a 58 y.o. female presenting to [facility] on 12/13/2023 with pertinent medical history of prehypertension, CVA/TIA, migraines, and intermittent exotropia of right eye who presents for evaluation of irregular heart rhythm.  Patient reports she had went to the urgent care due to \"not feeling well\".  Patient reports having a headache greater than 2 days sharp in nature with visual disturbances of lightening bolts in her eyes.  Per patient, this is unlike her migraines, which typically last 1 day, which she takes aspirin.  In addition, patient complains of intermittent dizziness.  Patient denies any falls or head trauma.  Patient reports decreased appetite and oral intake since Friday.  She originally went to the urgent care who recommended her come to the ED for evaluation of irregular heart rhythm.  Patient had went to another hospital, however waited several hours without being seen.  Patient then went home for a couple hours and slept and came to [facility] ED.  Denies any recent fever/chills, chest pain / palpitations, shortness of breath, cough, abdominal pain, N/V/D/C, dysuria, or hematuria.    Upon exam, patient did noted to have a mass under right side of jaw in the submandibular lymph node region.  Patient reports she has had it for a few years and has never had it checked.  In addition, patient noted to have rapid blinking of bilateral eyes, which patient attributes to dry eyes, however unable to control.    In the ED, CBC with RBC 5.75, hemoglobin 18.4, hematocrit 54.8.  CMP with sodium 131 and chloride 95.  .  High-sensitivity troponin 1 negative x 2 at 6 repeated 7.  Chest x-ray negative.  CT chest negative for PE.  Heart size mildly enlarged.  Mild bronchial thickening.  While in the ED patient received Lovenox 70 mg subcu x 1.  Patient admitted for observation with new onset A-fib, elevated BNP, and hyponatremia.    Past Medical History  She has a past medical " history of Closed fracture of upper end of humerus (11/03/2015), Dry eyes, Elevated liver enzymes (12/13/2023), Intermittent exotropia of right eye, Migraines, Pre-hypertension (12/13/2023), Scoliosis, Shoulder fracture, left, and Stroke (CMS/HCC).    Surgical History  She has a past surgical history that includes Fracture surgery; Eye surgery (Right); Ectopic pregnancy surgery; and Hernia repair.     Social History  She reports that she has been smoking cigarettes. She has been smoking an average of .5 packs per day. She has never used smokeless tobacco. She reports that she does not currently use alcohol. She reports that she does not use drugs.    Family History  Family History   Problem Relation Name Age of Onset    Cancer Mother      Heart attack Father          dies at the age of 50's    No Known Problems Sister      No Known Problems Son      Other (borderline diabetic) Maternal Grandfather      Heart attack Paternal Grandmother      Stroke Paternal Grandfather          Allergies  Azithromycin    Review of Systems (Bold words are positive)    Constitutional: NEGATIVE: Fever, Chills, Malaise, Weight Loss, Fatigue, decreased appetite     Eyes: NEGATIVE: Blurry Vision, Vision Loss/ Change, Redness, Drainage     ENMT: NEGATIVE: Nasal Discharge, Nasal Congestion, Throat Pain, Mouth Pain, Ear Pain     Respiratory: NEGATIVE: Dry Cough, Productive Cough, Shortness of Breath, Wheezing, Hemoptysis     Cardiac: NEGATIVE: Chest Pain, Dyspnea on Exertion, Palpitations, Orthopnea, Syncope      Gastrointestinal: Negative: Nausea, Vomiting, Diarrhea, Constipation, Abdominal Pain     Genitourinary: NEGATIVE: Dysuria, Frequency, Hematuria, Flank Pain     Musculoskeletal: Negative: Decreased ROM, Pain, Weakness, Swelling     Neurological: NEGATIVE: Confusion, Dizziness, Headache, Syncope, Seizures     Psychiatric: NEGATIVE: Anxiety, Depression     Skin: NEGATIVE: Mass, Rash, Pruritus,  Ulcer, Pain     Endocrine: NEGATIVE:  "Sweat, Thirst, Polydipsia      Hematologic/Lymph: NEGATIVE: Anemia, Bruising, Night Sweats     Allergic/Immunologic: NEGATIVE: Itching, Sneezing        Physical Exam  Constitutional: Awake and alert, Calm, and Cooperative. Speech clear. No acute distress.  Skin: Pink, warm, and dry. No lesions or rashes.  Eyes: PERRL, sclera clear, No swelling or drainage noted, right wandering eye, frequent rapid blinking noted.  ENMT: Mucous membranes pink and moist, no apparent injury, no lesions seen  Head/Neck: Neck Supple, no apparent injury, trachea midline, no palpable masses on head  Cardiac: irregular rhythm and rate, Auscultated S1 & S2, no murmurs  Lungs: Diminished throughout all lobes bilaterally, symmetrical chest rise, no accessory muscle usage, unlabored  Abdomen: Soft, non-tender, no rebound tenderness or guarding, nondistended, positive bowel sounds in all quadrants  Musculoskeletal: ROM intact, no joint swelling, normal strength  Extremities: No edema, No cyanosis, 1+ pulses of the extremities, see skin assessment for wounds  Neurological: Alert and Oriented x 3, intact senses, bilateral hand grasps and foot push/pulls equal.  Psychological: Appropriate mood and behavior.       Last Recorded Vitals  Blood pressure 152/86, pulse 70, temperature 36.5 °C (97.7 °F), temperature source Temporal, resp. rate 16, height 1.753 m (5' 9\"), weight 72.6 kg (160 lb), SpO2 98 %.  Visit Vitals  /86 (BP Location: Left arm, Patient Position: Sitting)   Pulse 70   Temp 36.5 °C (97.7 °F) (Temporal)   Resp 16   Ht 1.753 m (5' 9\")   Wt 72.6 kg (160 lb)   SpO2 98%   BMI 23.63 kg/m²   OB Status Postmenopausal   Smoking Status Every Day   BSA 1.88 m²     Weight: 72.6 kg (160 lb)   Pain Assessment: 0-10  Pain Score: 0 - No pain  Pain Type: Acute pain  Pain Location: Head  Pain Descriptors: Pressure  Pain Frequency: Intermittent        Relevant Results  Results for orders placed or performed during the hospital encounter of 12/13/23 " (from the past 24 hour(s))   CBC with Differential   Result Value Ref Range    WBC 9.4 4.4 - 11.3 x10*3/uL    nRBC 0.0 0.0 - 0.0 /100 WBCs    RBC 5.75 (H) 4.00 - 5.20 x10*6/uL    Hemoglobin 18.4 (H) 12.0 - 16.0 g/dL    Hematocrit 54.8 (H) 36.0 - 46.0 %    MCV 95 80 - 100 fL    MCH 32.0 26.0 - 34.0 pg    MCHC 33.6 32.0 - 36.0 g/dL    RDW 12.2 11.5 - 14.5 %    Platelets 304 150 - 450 x10*3/uL    Neutrophils % 61.9 40.0 - 80.0 %    Immature Granulocytes %, Automated 0.3 0.0 - 0.9 %    Lymphocytes % 25.4 13.0 - 44.0 %    Monocytes % 10.3 2.0 - 10.0 %    Eosinophils % 1.2 0.0 - 6.0 %    Basophils % 0.9 0.0 - 2.0 %    Neutrophils Absolute 5.83 1.20 - 7.70 x10*3/uL    Immature Granulocytes Absolute, Automated 0.03 0.00 - 0.70 x10*3/uL    Lymphocytes Absolute 2.39 1.20 - 4.80 x10*3/uL    Monocytes Absolute 0.97 0.10 - 1.00 x10*3/uL    Eosinophils Absolute 0.11 0.00 - 0.70 x10*3/uL    Basophils Absolute 0.08 0.00 - 0.10 x10*3/uL   Comprehensive Metabolic Panel   Result Value Ref Range    Glucose 85 74 - 99 mg/dL    Sodium 131 (L) 136 - 145 mmol/L    Potassium 3.7 3.5 - 5.3 mmol/L    Chloride 95 (L) 98 - 107 mmol/L    Bicarbonate 22 21 - 32 mmol/L    Anion Gap 18 10 - 20 mmol/L    Urea Nitrogen 10 6 - 23 mg/dL    Creatinine 0.59 0.50 - 1.05 mg/dL    eGFR >90 >60 mL/min/1.73m*2    Calcium 9.1 8.6 - 10.3 mg/dL    Albumin 4.4 3.4 - 5.0 g/dL    Alkaline Phosphatase 93 33 - 136 U/L    Total Protein 7.9 6.4 - 8.2 g/dL    AST 22 9 - 39 U/L    Bilirubin, Total 0.8 0.0 - 1.2 mg/dL    ALT 11 7 - 45 U/L   Brain Natriuretic Peptide   Result Value Ref Range     (H) 0 - 99 pg/mL   Troponin I, High Sensitivity   Result Value Ref Range    Troponin I, High Sensitivity 6 0 - 13 ng/L   Protime-INR   Result Value Ref Range    Protime 12.0 9.8 - 12.8 seconds    INR 1.1 0.9 - 1.1   Troponin I, High Sensitivity   Result Value Ref Range    Troponin I, High Sensitivity 7 0 - 13 ng/L      CT angio chest for pulmonary embolism    Result Date:  12/13/2023  Interpreted By:  Mamadou Diagle, STUDY: CT ANGIO CHEST FOR PULMONARY EMBOLISM;  12/13/2023 7:13 pm   INDICATION: Signs/Symptoms:New onset atrial fibrillation, history of prior DVT.   COMPARISON: None   ACCESSION NUMBER(S): [id number]   ORDERING CLINICIAN: ASHLEY OSWALD   TECHNIQUE: Helical data acquisition of the chest was obtained after intravenous administration of intravenous contrast, as per PE protocol. Images were reformatted in coronal and sagittal planes. Axial and coronal maximum intensity projection (MIP) images were created and reviewed.   FINDINGS: POTENTIAL LIMITATIONS OF THE STUDY: Motion degradation   HEART AND VESSELS: There are no discrete filling defects within main pulmonary artery and its branches to suggest acute pulmonary embolism. Enlarged main pulmonary artery compatible with pulmonary arterial hypertension   The thoracic aorta normal in course and caliber. No coronary artery calcifications are seen. Please note, the study is not optimized for evaluation of coronary arteries. Heart size is mildly enlarged   MEDIASTINUM AND STANTON, LOWER NECK AND AXILLA: The visualized thyroid gland is within normal limits. No evidence of thoracic lymphadenopathy by CT criteria. Esophagus appears within normal limits as seen.   LUNGS AND AIRWAYS: The trachea and central airways are patent. No endobronchial lesion is seen.   The bilateral lungs are clear without evidence of focal consolidation, pleural effusion, or pneumothorax.     Demineralization. Slight sliding hiatal hernia. Fatty infiltration liver. Unremarkable adrenal glands small posterior left diaphragmatic hernia containing fat       1. No evidence of acute pulmonary embolism. Mild motion degradation. Heart size is mildly enlarged. Mild bronchial thickening. No focal infiltrate     MACRO: None   Signed by: Mamadou Daigle 12/13/2023 7:44 PM Dictation workstation:   [id number]    XR chest 1 view    Result Date: 12/13/2023  Interpreted  By:  Laura Gonzalez, STUDY: XR CHEST 1 VIEW;  12/13/2023 4:01 pm   INDICATION: Signs/Symptoms:Chest Pain.   COMPARISON: None.   ACCESSION NUMBER(S): [id number]   ORDERING CLINICIAN: BRADEN PIERCE   FINDINGS: CARDIOMEDIASTINAL SILHOUETTE: Cardiomediastinal silhouette is normal in size and configuration.     LUNGS: Lungs are clear.   ABDOMEN: No remarkable upper abdominal findings.     BONES: No acute osseous changes.       No acute cardiopulmonary process.   MACRO: None   Signed by: Laura Gonzalez 12/13/2023 4:12 PM Dictation workstation:   [id number]     EKG: A-fib, ventricular rate 79, AL *, QRS 84, QTc 479. No ST elevation or depression noted.       Home Medications  Prior to Admission medications    Not on File       Medications  Scheduled medications  enoxaparin, 1 mg/kg, subcutaneous, q12h      Continuous medications  sodium chloride 0.9%, 75 mL/hr, Last Rate: 75 mL/hr (12/13/23 9950)      PRN medications  PRN medications: melatonin, polyethylene glycol       Assessment/Plan   Principal Problem:    New onset a-fib (CMS/HCC)  Active Problems:    Elevated brain natriuretic peptide (BNP) level    Hyponatremia    HTN (hypertension)    Headache    Visual disturbance    Nicotine dependence    Mass of submandibular region    Excessive involuntary blinking      Plan:  Code Status: Full Code   Consult: Cardiology.  ATB: None at this time.   IVFs: Normal saline at 75 MLS per hour x 10 hours.  Meds: Artificial tears to bilateral eyes as needed for eye dryness, transdermal nicotine patch 21 mg per 24 hours, melatonin 3 milligrams p.o. daily as needed for insomnia, MiraLAX 17 gms p.o daily as needed for constipation.  Not on any home meds.  Avoid medication that will prolong the QT.  Diet: Regular.  Telemetry monitoring.  Vital signs with BP, HR, & RR Q 4 hours.  Pulse ox Q 4 hours.   Admission height and weight.  Labs ordered: CBC, BMP, magnesium, phosphorus, high-sensitivity troponin 1, hemoglobin A1c, lipid  panel.  Test ordered: Echocardiogram and CT of head due to visual disturbance and headache.  Monitor / trend labs while inpatient.  Replace electrolytes as needed.  Aspiration precautions.  Out of bed with assistance   Fall precautions  Encourage & educate on smoking cessation.  Call physician for temperature < 36.5 or >38.0 degrees celsius.  Call physician for pulse <50 or >120.  Call physician for respiratory rate <10 or >22.  Call physician for systolic blood pressure <90 or >170.  Call physician for diastolic blood pressure < 50 or >100.  Call physician for pulse ox <92%.  See orders for further plan of care ordered.     VTE prophylaxis:   Therapeutic Lovenox subcutaneous  BLE SCDs.  Monitor for s/s of bleeding    Further evaluation and management per attending and consulting physicians.      This note has been transcribed using Dragon voice recognition system and there is a possibility of unintentional typing misprints.  Any information found to be copied from previous providers is done in the best interest of the patient to provide accurate, quality, and continuity of care.     I spent 40 minutes in the professional and overall care of this patient.      ADRYAN Mathur-[facility]  Med. [place]

## 2023-12-15 VITALS
TEMPERATURE: 96.6 F | DIASTOLIC BLOOD PRESSURE: 70 MMHG | HEART RATE: 70 BPM | OXYGEN SATURATION: 96 % | WEIGHT: 161.6 LBS | BODY MASS INDEX: 23.93 KG/M2 | RESPIRATION RATE: 20 BRPM | HEIGHT: 69 IN | SYSTOLIC BLOOD PRESSURE: 122 MMHG

## 2023-12-15 LAB
ANION GAP SERPL CALC-SCNC: 13 MMOL/L (ref 10–20)
ATRIAL RATE: 62 BPM
BUN SERPL-MCNC: 10 MG/DL (ref 6–23)
CALCIUM SERPL-MCNC: 8.6 MG/DL (ref 8.6–10.3)
CHLORIDE SERPL-SCNC: 104 MMOL/L (ref 98–107)
CO2 SERPL-SCNC: 22 MMOL/L (ref 21–32)
CREAT SERPL-MCNC: 0.59 MG/DL (ref 0.5–1.05)
ERYTHROCYTE [DISTWIDTH] IN BLOOD BY AUTOMATED COUNT: 12.2 % (ref 11.5–14.5)
GFR SERPL CREATININE-BSD FRML MDRD: >90 ML/MIN/1.73M*2
GLUCOSE SERPL-MCNC: 97 MG/DL (ref 74–99)
HCT VFR BLD AUTO: 49.7 % (ref 36–46)
HGB BLD-MCNC: 16.6 G/DL (ref 12–16)
MAGNESIUM SERPL-MCNC: 2.04 MG/DL (ref 1.6–2.4)
MCH RBC QN AUTO: 31.9 PG (ref 26–34)
MCHC RBC AUTO-ENTMCNC: 33.4 G/DL (ref 32–36)
MCV RBC AUTO: 95 FL (ref 80–100)
NRBC BLD-RTO: 0 /100 WBCS (ref 0–0)
PLATELET # BLD AUTO: 230 X10*3/UL (ref 150–450)
POTASSIUM SERPL-SCNC: 3.8 MMOL/L (ref 3.5–5.3)
Q ONSET: 222 MS
QRS COUNT: 13 BEATS
QRS DURATION: 84 MS
QT INTERVAL: 418 MS
QTC CALCULATION(BAZETT): 479 MS
QTC FREDERICIA: 458 MS
R AXIS: 78 DEGREES
RBC # BLD AUTO: 5.21 X10*6/UL (ref 4–5.2)
SODIUM SERPL-SCNC: 135 MMOL/L (ref 136–145)
T AXIS: 56 DEGREES
T OFFSET: 431 MS
VENTRICULAR RATE: 79 BPM
WBC # BLD AUTO: 6.2 X10*3/UL (ref 4.4–11.3)

## 2023-12-15 PROCEDURE — 83735 ASSAY OF MAGNESIUM: CPT | Performed by: NURSE PRACTITIONER

## 2023-12-15 PROCEDURE — 85027 COMPLETE CBC AUTOMATED: CPT | Performed by: NURSE PRACTITIONER

## 2023-12-15 PROCEDURE — 99232 SBSQ HOSP IP/OBS MODERATE 35: CPT | Performed by: INTERNAL MEDICINE

## 2023-12-15 PROCEDURE — 36415 COLL VENOUS BLD VENIPUNCTURE: CPT | Performed by: NURSE PRACTITIONER

## 2023-12-15 PROCEDURE — 2500000001 HC RX 250 WO HCPCS SELF ADMINISTERED DRUGS (ALT 637 FOR MEDICARE OP): Performed by: NURSE PRACTITIONER

## 2023-12-15 PROCEDURE — 80048 BASIC METABOLIC PNL TOTAL CA: CPT | Performed by: NURSE PRACTITIONER

## 2023-12-15 PROCEDURE — 99232 SBSQ HOSP IP/OBS MODERATE 35: CPT | Performed by: NURSE PRACTITIONER

## 2023-12-15 RX ORDER — ATORVASTATIN CALCIUM 40 MG/1
40 TABLET, FILM COATED ORAL NIGHTLY
Qty: 90 TABLET | Refills: 2 | Status: SHIPPED | OUTPATIENT
Start: 2023-12-15 | End: 2024-05-24 | Stop reason: WASHOUT

## 2023-12-15 RX ORDER — NAPROXEN SODIUM 220 MG/1
81 TABLET, FILM COATED ORAL DAILY
Qty: 30 TABLET | Refills: 2 | Status: SHIPPED | OUTPATIENT
Start: 2023-12-16

## 2023-12-15 RX ORDER — NAPROXEN SODIUM 220 MG/1
81 TABLET, FILM COATED ORAL DAILY
Status: DISCONTINUED | OUTPATIENT
Start: 2023-12-15 | End: 2023-12-15 | Stop reason: HOSPADM

## 2023-12-15 RX ADMIN — ASPIRIN 81 MG CHEWABLE TABLET 81 MG: 81 TABLET CHEWABLE at 11:49

## 2023-12-15 ASSESSMENT — COGNITIVE AND FUNCTIONAL STATUS - GENERAL
DAILY ACTIVITIY SCORE: 24
MOBILITY SCORE: 24

## 2023-12-15 ASSESSMENT — PAIN - FUNCTIONAL ASSESSMENT
PAIN_FUNCTIONAL_ASSESSMENT: 0-10
PAIN_FUNCTIONAL_ASSESSMENT: 0-10

## 2023-12-15 ASSESSMENT — PAIN SCALES - GENERAL
PAINLEVEL_OUTOF10: 0 - NO PAIN
PAINLEVEL_OUTOF10: 0 - NO PAIN

## 2023-12-15 NOTE — H&P
History Of Present Illness  Tomasa Gutierres is a 58 y.o. F with PMH of CVA/TIA, migraines, presented to ER due to irregular heartbeat patient also complaining of headache which is worse in last 2 days, associated with visual disturbance.  Per patient this was different than her usual migraine.  She denied any chest pain shortness of breath.  Patient initially went to urgent care from where she was sent to ER.  She is now being admitted on telemetry floor for further care.            Past Medical History  She has a past medical history of Closed fracture of upper end of humerus (11/03/2015), Dry eyes, Elevated liver enzymes (12/13/2023), Intermittent exotropia of right eye, Migraines, Pre-hypertension (12/13/2023), Scoliosis, Shoulder fracture, left, and Stroke (CMS/HCC).    Surgical History  She has a past surgical history that includes Fracture surgery; Eye surgery (Right); Ectopic pregnancy surgery; Hernia repair; MR angio head wo IV contrast (12/14/2023); and MR angio neck wo IV contrast (12/14/2023).     Social History  She reports that she has been smoking cigarettes. She has been smoking an average of .5 packs per day. She has never used smokeless tobacco. She reports that she does not currently use alcohol. She reports that she does not use drugs.    Family History  Family History   Problem Relation Name Age of Onset    Cancer Mother      Heart attack Father          dies at the age of 50's    No Known Problems Sister      No Known Problems Son      Other (borderline diabetic) Maternal Grandfather      Heart attack Paternal Grandmother      Stroke Paternal Grandfather          Allergies  Azithromycin    Current medications:      Current Facility-Administered Medications:     atorvastatin (Lipitor) tablet 40 mg, 40 mg, oral, Nightly, Jaky Byrd MD    [Held by provider] enoxaparin (Lovenox) syringe 70 mg, 1 mg/kg, subcutaneous, q12h, ADRYAN Mathur-CNP, 70 mg at 12/13/23 1945    [MAR Hold]  "lubricating eye drops ophthalmic solution 1 drop, 1 drop, Both Eyes, PRN, Gege SERRANO YOLANDA Keating    [MAR Hold] melatonin tablet 3 mg, 3 mg, oral, Daily PRN, Gege SERRANO YOLANDA Keating    [MAR Hold] nicotine (Nicoderm CQ) 21 mg/24 hr patch 1 patch, 1 patch, transdermal, Daily, Gege SERRANO YOLANDA Keating    perflutren lipid microspheres (Definity) injection 0.5-10 mL of dilution, 0.5-10 mL of dilution, intravenous, Once in imaging, Gege SERRANO YOLANDA Keating    [MAR Hold] polyethylene glycol (Glycolax, Miralax) packet 17 g, 17 g, oral, Daily PRN, Gege ZACH YOLANDA Keating       Review of Systems     10 organ ROS pertinent for history mentioned above otherwise negative    Physical Exam  HENT:      Head: Normocephalic.   Eyes:      Conjunctiva/sclera: Conjunctivae normal.   Cardiovascular:      Rate and Rhythm: Regular rhythm.   Pulmonary:      Breath sounds: Normal breath sounds.   Abdominal:      General: Bowel sounds are normal.      Palpations: Abdomen is soft.   Musculoskeletal:         General: Normal range of motion.   Skin:     General: Skin is warm and dry.   Neurological:      General: No focal deficit present.      Mental Status: She is alert.   Psychiatric:         Behavior: Behavior normal.              Last Recorded Vitals      Blood pressure 137/82, pulse 82, temperature 36.6 °C (97.9 °F), temperature source Temporal, resp. rate 20, height 1.753 m (5' 9\"), weight 72.6 kg (160 lb), SpO2 96 %.    Relevant Results     Results for orders placed or performed during the hospital encounter of 12/13/23 (from the past 24 hour(s))   Troponin I, High Sensitivity   Result Value Ref Range    Troponin I, High Sensitivity 7 0 - 13 ng/L   Magnesium   Result Value Ref Range    Magnesium 2.14 1.60 - 2.40 mg/dL   CBC   Result Value Ref Range    WBC 6.8 4.4 - 11.3 x10*3/uL    nRBC 0.0 0.0 - 0.0 /100 WBCs    RBC 5.20 4.00 - 5.20 x10*6/uL    Hemoglobin 16.7 (H) 12.0 - 16.0 g/dL    Hematocrit 49.3 (H) 36.0 - 46.0 %    MCV 95 80 - " 100 fL    MCH 32.1 26.0 - 34.0 pg    MCHC 33.9 32.0 - 36.0 g/dL    RDW 12.3 11.5 - 14.5 %    Platelets 251 150 - 450 x10*3/uL   Basic metabolic panel   Result Value Ref Range    Glucose 81 74 - 99 mg/dL    Sodium 136 136 - 145 mmol/L    Potassium 4.0 3.5 - 5.3 mmol/L    Chloride 104 98 - 107 mmol/L    Bicarbonate 23 21 - 32 mmol/L    Anion Gap 13 10 - 20 mmol/L    Urea Nitrogen 10 6 - 23 mg/dL    Creatinine 0.56 0.50 - 1.05 mg/dL    eGFR >90 >60 mL/min/1.73m*2    Calcium 8.7 8.6 - 10.3 mg/dL   Phosphorus   Result Value Ref Range    Phosphorus 4.2 2.5 - 4.9 mg/dL   Lipid Panel   Result Value Ref Range    Cholesterol 148 0 - 199 mg/dL    HDL-Cholesterol 38.3 mg/dL    Cholesterol/HDL Ratio 3.9     LDL Calculated 93 <=99 mg/dL    VLDL 16 0 - 40 mg/dL    Triglycerides 82 0 - 149 mg/dL    Non HDL Cholesterol 110 0 - 149 mg/dL   Hemoglobin A1C   Result Value Ref Range    Hemoglobin A1C 5.0 see below %    Estimated Average Glucose 97 Not Established mg/dL   TSH   Result Value Ref Range    Thyroid Stimulating Hormone 1.95 0.44 - 3.98 mIU/L   T4, free   Result Value Ref Range    Thyroxine, Free 1.25 (H) 0.61 - 1.12 ng/dL   Transthoracic Echo (TTE) Complete   Result Value Ref Range    AV pk francisco j 1.01     LV biplane EF 61     LVOT diam 2.10     MV avg E/e' ratio 12.07     Tricuspid annular plane systolic excursion 1.6     RV free wall pk S' 7.07     RVSP 19.6     LVIDd 4.50     Aortic Valve Area by Continuity of Peak Velocity 2.29     AV pk grad 4.1     LV A4C EF 66.4    ECG 12 lead   Result Value Ref Range    Ventricular Rate 79 BPM    Atrial Rate 62 BPM    QRS Duration 84 ms    QT Interval 418 ms    QTC Calculation(Bazett) 479 ms    R Axis 78 degrees    T Axis 56 degrees    QRS Count 13 beats    Q Onset 222 ms    T Offset 431 ms    QTC Fredericia 458 ms          Radiology      XR CHEST 1 VIEW;  12/13/2023 4:01 pm      INDICATION:  Signs/Symptoms:Chest Pain.      COMPARISON:  None.      ACCESSION NUMBER(S):  [id number]       ORDERING CLINICIAN:  BRADEN PIERCE      FINDINGS:  CARDIOMEDIASTINAL SILHOUETTE:  Cardiomediastinal silhouette is normal in size and configuration.          LUNGS:  Lungs are clear.      ABDOMEN:  No remarkable upper abdominal findings.        BONES:  No acute osseous changes.      IMPRESSION:  No acute cardiopulmonary process.      Assessment/Plan   Principal Problem:    New onset a-fib (CMS/HCC)  Active Problems:    Elevated brain natriuretic peptide (BNP) level    Hyponatremia    HTN (hypertension)    Headache    Visual disturbance    Nicotine dependence    Mass of submandibular region    Excessive involuntary blinking    New onset atrial fibrillation (CMS/HCC)      PLAN: Pt was admitted on tele, monitor on tel for any new arrhythmia, cardiology consult, neuro checks, may need CT head and Neurology consult, d/w CNP, follow closely, I ve coordinated the care.        Jeremy Grimes MD

## 2023-12-15 NOTE — PROGRESS NOTES
Subjective:   The patient denies chest discomfort, dyspnea, palpitations, orthopnea, PND, syncope, and near syncope.    Overnight Events:  None     Last Recorded Vitals:  Vitals:    12/14/23 2339 12/15/23 0524 12/15/23 0819 12/15/23 1148   BP: 131/70 129/68 147/79 116/67   BP Location:   Right arm    Patient Position:   Lying    Pulse: 76 60 71 62   Resp: 18 20 18 20   Temp: 36 °C (96.8 °F) 36.2 °C (97.2 °F) 35.6 °C (96.1 °F) 36.1 °C (97 °F)   TempSrc: Temporal Temporal Temporal    SpO2: 97% 96% 97% 97%   Weight:  73.3 kg (161 lb 9.6 oz)     Height:       Body mass index is 23.86 kg/m².    Last Labs:    Results from last 7 days   Lab Units 12/15/23  0613 12/14/23  0638 12/13/23  1535   SODIUM mmol/L 135*   < > 131*   POTASSIUM mmol/L 3.8   < > 3.7   CHLORIDE mmol/L 104   < > 95*   CO2 mmol/L 22   < > 22   BUN mg/dL 10   < > 10   CREATININE mg/dL 0.59   < > 0.59   CALCIUM mg/dL 8.6   < > 9.1   PROTEIN TOTAL g/dL  --   --  7.9   BILIRUBIN TOTAL mg/dL  --   --  0.8   ALK PHOS U/L  --   --  93   ALT U/L  --   --  11   AST U/L  --   --  22   GLUCOSE mg/dL 97   < > 85    < > = values in this interval not displayed.        Results for orders placed or performed during the hospital encounter of 12/13/23 (from the past 24 hour(s))   ECG 12 lead   Result Value Ref Range    Ventricular Rate 79 BPM    Atrial Rate 62 BPM    QRS Duration 84 ms    QT Interval 418 ms    QTC Calculation(Bazett) 479 ms    R Axis 78 degrees    T Axis 56 degrees    QRS Count 13 beats    Q Onset 222 ms    T Offset 431 ms    QTC Fredericia 458 ms   Magnesium   Result Value Ref Range    Magnesium 2.04 1.60 - 2.40 mg/dL   CBC   Result Value Ref Range    WBC 6.2 4.4 - 11.3 x10*3/uL    nRBC 0.0 0.0 - 0.0 /100 WBCs    RBC 5.21 (H) 4.00 - 5.20 x10*6/uL    Hemoglobin 16.6 (H) 12.0 - 16.0 g/dL    Hematocrit 49.7 (H) 36.0 - 46.0 %    MCV 95 80 - 100 fL    MCH 31.9 26.0 - 34.0 pg    MCHC 33.4 32.0 - 36.0 g/dL    RDW 12.2 11.5 - 14.5 %    Platelets 230 150 - 450  x10*3/uL   Basic metabolic panel   Result Value Ref Range    Glucose 97 74 - 99 mg/dL    Sodium 135 (L) 136 - 145 mmol/L    Potassium 3.8 3.5 - 5.3 mmol/L    Chloride 104 98 - 107 mmol/L    Bicarbonate 22 21 - 32 mmol/L    Anion Gap 13 10 - 20 mmol/L    Urea Nitrogen 10 6 - 23 mg/dL    Creatinine 0.59 0.50 - 1.05 mg/dL    eGFR >90 >60 mL/min/1.73m*2    Calcium 8.6 8.6 - 10.3 mg/dL         PTT - No results in last year.  1.1   12.0 _     Troponin I, High Sensitivity   Date/Time Value Ref Range Status   12/13/2023 10:54 PM 7 0 - 13 ng/L Final   12/13/2023 03:35 PM 6 0 - 13 ng/L Final     BNP   Date/Time Value Ref Range Status   12/13/2023 03:35  (H) 0 - 99 pg/mL Final     Hemoglobin A1C   Date/Time Value Ref Range Status   12/14/2023 06:38 AM 5.0 see below % Final     LDL Calculated   Date/Time Value Ref Range Status   12/14/2023 06:38 AM 93 <=99 mg/dL Final     Comment:                                 Near   Borderline      AGE      Desirable  Optimal    High     High     Very High     0-19 Y     0 - 109     ---    110-129   >/= 130     ----    20-24 Y     0 - 119     ---    120-159   >/= 160     ----      >24 Y     0 -  99   100-129  130-159   160-189     >/=190       VLDL   Date/Time Value Ref Range Status   12/14/2023 06:38 AM 16 0 - 40 mg/dL Final   10/04/2018 11:42 AM 10 0 - 40 mg/dL Final      Last I/O:  I/O last 3 completed shifts:  In: 407.5 (5.6 mL/kg) [I.V.:407.5 (5.6 mL/kg)]  Out: - (0 mL/kg)   Weight: 73.3 kg     Allergies:  Azithromycin, Orange, and Orange juice    Inpatient Medications:  Scheduled medications   Medication Dose Route Frequency    [START ON 12/16/2023] apixaban  5 mg oral q12h    aspirin  81 mg oral Daily    atorvastatin  40 mg oral Nightly    [Held by provider] enoxaparin  1 mg/kg subcutaneous q12h    nicotine  1 patch transdermal Daily    perflutren lipid microspheres  0.5-10 mL of dilution intravenous Once in imaging     PRN medications   Medication    lubricating eye drops     melatonin    polyethylene glycol     Continuous Medications   Medication Dose Last Rate     Outpatient Medications:  No current outpatient medications    Current Imaging  MR brain wo IV contrast    Result Date: 12/14/2023  Interpreted By:  Cipriano Zhang, STUDY: MR ANGIO HEAD WO IV CONTRAST; MR BRAIN WO IV CONTRAST; MR ANGIO NECK WO IV CONTRAST   INDICATION: Signs/Symptoms:stroke   COMPARISON: Head CT performed on the same day   ACCESSION NUMBER(S): [id number]; [id number]; [id number]   ORDERING CLINICIAN: CRYSTAL WARE   TECHNIQUE: Multi-planar multi-sequential MR imaging of the brain was performed without intravenous contrast. MRA of the head using time-of-flight technique was performed without intravenous contrast. MRA of the neck using time-of-flight technique was performed without intravenous contrast.   FINDINGS: MRI BRAIN:   Acute infarct within the left PCA territory involving the medial aspect of the left temporal and occipital lobes, posterior aspect of the left para hippocampal gyrus, and posterior left thalamus. Additional small focus of infarct involving the lateral left splenium of the corpus callosum.   No hemorrhagic conversion. Mild mass effect with partial effacement of the atrium and occipital horn of the left lateral ventricle. No contralateral entrapment.   Chronic infarcts in bilateral cerebellar hemispheres.   No hydrocephalus.  No extra-axial fluid collections. The skull base flow voids are present.   The visualized intraorbital contents are normal. The imaged portions of the paranasal sinuses are clear. The mastoid air cells are clear. The visualized osseous structures, soft tissues and partially visualized parotid glands appear normal.   MRA NECK:   Standard configuration of the aortic arch with no gross ostial stenosis of the great vessels.   The common carotid arteries are patent bilaterally without evidence of stenosis. There is no narrowing of the cervical internal carotid arteries  bilaterally.   Right vertebral artery is diffusely diminutive in difficult to visualized. Left vertebral artery is dominant and patent.   MRA HEAD:   Normal distal internal carotid arteries.   High-grade stenosis versus occlusion in the left P2/P3 junction (series 6, image 79) with complete reconstitution of the remainder of the left P3 segment and P4 segment. Right PCA is normal. Bilateral MCAs and ACAs are normal.   Normal vertebrobasilar system.   No evidence of aneurysm or vascular malformation.       Acute infarct in the left PCA territory as detailed without associated hemorrhagic conversion. Mild associated mass effect with partial effacement of the atrium and occipital horn of the left lateral ventricle; no associated entrapment. No midline shift or herniation.   High-grade stenosis versus occlusion of the left P2/P3 junction with immediate reconstitution of distal left PCA branches.   Remainder of the cervical and intracranial vasculature is within normal limits. Variant anatomy as detailed.   _____________ NASCET criteria for internal carotid artery stenosis: Mild: 0% to 49% Moderate: 50% to 69% Severe: 70% to 99% Complete Occlusion   Signed by: Cipriano Zhang 12/14/2023 7:09 PM Dictation workstation:   [id number]    MR angio head wo IV contrast    Result Date: 12/14/2023  Interpreted By:  Cipriano Zhang, STUDY: MR ANGIO HEAD WO IV CONTRAST; MR BRAIN WO IV CONTRAST; MR ANGIO NECK WO IV CONTRAST   INDICATION: Signs/Symptoms:stroke   COMPARISON: Head CT performed on the same day   ACCESSION NUMBER(S): [id number]; [id number]; [id number]   ORDERING CLINICIAN: CRYSTAL WARE   TECHNIQUE: Multi-planar multi-sequential MR imaging of the brain was performed without intravenous contrast. MRA of the head using time-of-flight technique was performed without intravenous contrast. MRA of the neck using time-of-flight technique was performed without intravenous contrast.   FINDINGS: MRI BRAIN:   Acute infarct within  the left PCA territory involving the medial aspect of the left temporal and occipital lobes, posterior aspect of the left para hippocampal gyrus, and posterior left thalamus. Additional small focus of infarct involving the lateral left splenium of the corpus callosum.   No hemorrhagic conversion. Mild mass effect with partial effacement of the atrium and occipital horn of the left lateral ventricle. No contralateral entrapment.   Chronic infarcts in bilateral cerebellar hemispheres.   No hydrocephalus.  No extra-axial fluid collections. The skull base flow voids are present.   The visualized intraorbital contents are normal. The imaged portions of the paranasal sinuses are clear. The mastoid air cells are clear. The visualized osseous structures, soft tissues and partially visualized parotid glands appear normal.   MRA NECK:   Standard configuration of the aortic arch with no gross ostial stenosis of the great vessels.   The common carotid arteries are patent bilaterally without evidence of stenosis. There is no narrowing of the cervical internal carotid arteries bilaterally.   Right vertebral artery is diffusely diminutive in difficult to visualized. Left vertebral artery is dominant and patent.   MRA HEAD:   Normal distal internal carotid arteries.   High-grade stenosis versus occlusion in the left P2/P3 junction (series 6, image 79) with complete reconstitution of the remainder of the left P3 segment and P4 segment. Right PCA is normal. Bilateral MCAs and ACAs are normal.   Normal vertebrobasilar system.   No evidence of aneurysm or vascular malformation.       Acute infarct in the left PCA territory as detailed without associated hemorrhagic conversion. Mild associated mass effect with partial effacement of the atrium and occipital horn of the left lateral ventricle; no associated entrapment. No midline shift or herniation.   High-grade stenosis versus occlusion of the left P2/P3 junction with immediate  reconstitution of distal left PCA branches.   Remainder of the cervical and intracranial vasculature is within normal limits. Variant anatomy as detailed.   _____________ NASCET criteria for internal carotid artery stenosis: Mild: 0% to 49% Moderate: 50% to 69% Severe: 70% to 99% Complete Occlusion   Signed by: Cipriano Zhang 12/14/2023 7:09 PM Dictation workstation:   [id number]    MR angio neck wo IV contrast    Result Date: 12/14/2023  Interpreted By:  Cipriano Zhang, STUDY: MR ANGIO HEAD WO IV CONTRAST; MR BRAIN WO IV CONTRAST; MR ANGIO NECK WO IV CONTRAST   INDICATION: Signs/Symptoms:stroke   COMPARISON: Head CT performed on the same day   ACCESSION NUMBER(S): [id number]; [id number]; [id number]   ORDERING CLINICIAN: CRYSTAL WARE   TECHNIQUE: Multi-planar multi-sequential MR imaging of the brain was performed without intravenous contrast. MRA of the head using time-of-flight technique was performed without intravenous contrast. MRA of the neck using time-of-flight technique was performed without intravenous contrast.   FINDINGS: MRI BRAIN:   Acute infarct within the left PCA territory involving the medial aspect of the left temporal and occipital lobes, posterior aspect of the left para hippocampal gyrus, and posterior left thalamus. Additional small focus of infarct involving the lateral left splenium of the corpus callosum.   No hemorrhagic conversion. Mild mass effect with partial effacement of the atrium and occipital horn of the left lateral ventricle. No contralateral entrapment.   Chronic infarcts in bilateral cerebellar hemispheres.   No hydrocephalus.  No extra-axial fluid collections. The skull base flow voids are present.   The visualized intraorbital contents are normal. The imaged portions of the paranasal sinuses are clear. The mastoid air cells are clear. The visualized osseous structures, soft tissues and partially visualized parotid glands appear normal.   MRA NECK:   Standard configuration  of the aortic arch with no gross ostial stenosis of the great vessels.   The common carotid arteries are patent bilaterally without evidence of stenosis. There is no narrowing of the cervical internal carotid arteries bilaterally.   Right vertebral artery is diffusely diminutive in difficult to visualized. Left vertebral artery is dominant and patent.   MRA HEAD:   Normal distal internal carotid arteries.   High-grade stenosis versus occlusion in the left P2/P3 junction (series 6, image 79) with complete reconstitution of the remainder of the left P3 segment and P4 segment. Right PCA is normal. Bilateral MCAs and ACAs are normal.   Normal vertebrobasilar system.   No evidence of aneurysm or vascular malformation.       Acute infarct in the left PCA territory as detailed without associated hemorrhagic conversion. Mild associated mass effect with partial effacement of the atrium and occipital horn of the left lateral ventricle; no associated entrapment. No midline shift or herniation.   High-grade stenosis versus occlusion of the left P2/P3 junction with immediate reconstitution of distal left PCA branches.   Remainder of the cervical and intracranial vasculature is within normal limits. Variant anatomy as detailed.   _____________ NASCET criteria for internal carotid artery stenosis: Mild: 0% to 49% Moderate: 50% to 69% Severe: 70% to 99% Complete Occlusion   Signed by: Cipriano Zhang 12/14/2023 7:09 PM Dictation workstation:   [id number]    ECG 12 lead    Result Date: 12/14/2023  Atrial fibrillation Low voltage QRS Abnormal ECG No previous ECGs available    Transthoracic Echo (TTE) Complete    Result Date: 12/14/2023            [facility] [address] [facility] [address]    Tel [phone] Fax [phone] TRANSTHORACIC ECHOCARDIOGRAM REPORT  Patient Name:      BRIANNE WALKER        Reading Physician:    [id number] Toribio Lomeli MD Study  Date:        12/14/2023            Ordering Provider:    [id number] JOSH WARE MRN/PID:           [id number]              Fellow: Accession#:        [id number]          Nurse: Date of Birth/Age: 1965 / 58 years Sonographer:          Payton Rdz RDCS Gender:            F                     Additional Staff: Height:            175.00 cm             Admit Date: Weight:            72.58 kg              Admission Status:     Inpatient -                                                                Routine BSA:               1.88 m2               Department Location:  [facility] ICU Front                                                                (19-26) Blood Pressure: 124 /73 mmHg Study Type:    TRANSTHORACIC ECHO (TTE) COMPLETE Diagnosis/ICD: Unspecified atrial fibrillation-I48.91 Indication:    new onset afib  Study Detail: The following Echo studies were performed: 2D, M-Mode, Doppler and               color flow.  PHYSICIAN INTERPRETATION: Left Ventricle: Left ventricular systolic function is normal, with an estimated ejection fraction of 60-65%. There are no regional wall motion abnormalities. The left ventricular cavity size is normal. Spectral Doppler shows a normal pattern of left ventricular diastolic filling. Left Atrium: The left atrium is normal in size. A bubble study using agitated saline was performed. Bubble study is negative. Right Ventricle: The right ventricle is normal in size. There is normal right ventricular global systolic function. Right Atrium: The right atrium is normal in size. Aortic Valve: The aortic valve appears structurally normal. There is no evidence of aortic valve regurgitation. The peak instantaneous gradient of the aortic valve is 4.1 mmHg. Mitral Valve: The mitral valve is normal in structure. There is mild mitral valve regurgitation. Tricuspid  Valve: The tricuspid valve is structurally normal. There is trace tricuspid regurgitation. The estimated RVSP is 20 mm. Pulmonic Valve: The pulmonic valve is structurally normal. There is trace pulmonic valve regurgitation. Pericardium: There is no pericardial effusion noted. Aorta: The aortic root is normal.  CONCLUSIONS:  1. Left ventricular systolic function is normal with a 60-65% estimated ejection fraction.  2. The estimated RVSP is 20 mm.  3. A bubble study using agitated saline was performed. Bubble study is negative. QUANTITATIVE DATA SUMMARY: 2D MEASUREMENTS:                          Normal Ranges: LAs:           4.70 cm   (2.7-4.0cm) IVSd:          0.90 cm   (0.6-1.1cm) LVPWd:         1.00 cm   (0.6-1.1cm) LVIDd:         4.50 cm   (3.9-5.9cm) LVIDs:         3.10 cm LV Mass Index: 76.1 g/m2 LV % FS        31.1 % LA VOLUME:                             Normal Ranges: LA Area A4C:     18.0 cm2 LA Area A2C:     23.0 cm2 LA Volume Index: 31.0 ml/m2 M-MODE MEASUREMENTS:                  Normal Ranges: Ao Root: 3.30 cm (2.0-3.7cm) AoV Exc: 2.00 cm (1.5-2.5cm) AORTA MEASUREMENTS:                    Normal Ranges: AoV Exc:   2.00 cm (1.5-2.5cm) Asc Ao, d: 3.40 cm (2.1-3.4cm) LV SYSTOLIC FUNCTION BY 2D PLANIMETRY (MOD):                     Normal Ranges: EF-A4C View: 66.4 % (>=55%) EF-A2C View: 50.3 % EF-Biplane:  61.3 % LV DIASTOLIC FUNCTION:                        Normal Ranges: MV Peak E:    0.91 m/s (0.7-1.2 m/s) MV e'         0.08 m/s (>8.0) MV lateral e' 0.08 m/s MV medial e'  0.08 m/s E/e' Ratio:   12.07    (<8.0) MITRAL VALVE:                 Normal Ranges: MV DT: 153 msec (150-240msec) MITRAL INSUFFICIENCY:                      Normal Ranges: MR Vmax: 443.00 cm/s AORTIC VALVE:                         Normal Ranges: AoV Vmax:      1.01 m/s (<=1.7m/s) AoV Peak P.1 mmHg (<20mmHg) LVOT Max Oc:  0.67 m/s (<=1.1m/s) LVOT Diameter: 2.10 cm  (1.8-2.4cm) AoV Area,Vmax: 2.29 cm2 (2.5-4.5cm2)  RIGHT VENTRICLE:  TAPSE: 15.6 mm RV s'  0.07 m/s TRICUSPID VALVE/RVSP:                             Normal Ranges: Peak TR Velocity: 2.04 m/s RV Syst Pressure: 19.6 mmHg (< 30mmHg)  [id number] Toribio Lomeli MD Electronically signed on 12/14/2023 at 11:15:33 AM  ** Final **     CT head wo IV contrast    Result Date: 12/14/2023  Interpreted By:  Heriberto Beltran, STUDY: CT HEAD WO IV CONTRAST;  12/14/2023 8:06 am   INDICATION: Signs/Symptoms:follow-up from previous CT head d/t infarct and questionable hemorrhage.   COMPARISON: 12/14/2023 time 1:57 a.m.   ACCESSION NUMBER(S): [id number]   ORDERING CLINICIAN: CRYSTAL WARE   TECHNIQUE: Axial CT images of the head were obtained without intravenous contrast administration.   FINDINGS: There is again evidence of abnormal hypodensity which appears to involve cortex and underlying white matter along the inferior left occipital lobe extending anteriorly into the posteroinferior left temporal lobe. There is associated mass effect with asymmetric effacement of surrounding sulci. The findings remain suspicious for an area of acute to early subacute infarction. There is again evidence of a similar small amount of hyperdensity associated with the area of abnormal hypodensity raising the possibility of a minimal amount of associated petechial hemorrhagic transformation.   An area of encephalomalacia/gliosis is again noted within the right parietal lobe.   Nonspecific white matter changes are again noted within cerebral hemispheres bilaterally which while nonspecific, given the patient's age, likely represent sequelae of small-vessel ischemic change.   The ventricular system remains nondilated.   There is no midline shift.   The visualized paranasal sinuses and mastoid air cells are clear.   No acute fracture is noted.       There is again evidence of abnormal hypodensity which appears to involve cortex and underlying white matter along the inferior left occipital lobe extending anteriorly into the  posteroinferior left temporal lobe. There is associated mass effect with asymmetric effacement of surrounding sulci. The findings remain suspicious for an area of acute to early subacute infarction. There is again evidence of a similar small amount of hyperdensity associated with the area of abnormal hypodensity raising the possibility of a minimal amount of associated petechial hemorrhagic transformation.   An area of encephalomalacia/gliosis is again noted within the right parietal lobe.   Nonspecific white matter changes are again noted within cerebral hemispheres bilaterally which while nonspecific, given the patient's age, likely represent sequelae of small-vessel ischemic change.   The ventricular system remains nondilated.   MACRO: None.   Signed by: Heriberto Beltran 12/14/2023 8:20 AM Dictation workstation:   [id number]    CT head wo IV contrast    Result Date: 12/14/2023  Interpreted By:  Zeus York, STUDY: CT HEAD WO IV CONTRAST;  12/14/2023 2:00 am   INDICATION: Signs/Symptoms:headache, visual disturbances.   COMPARISON: None   ACCESSION NUMBER(S): [id number]   ORDERING CLINICIAN: CRYSTAL WARE   TECHNIQUE: Contiguous axial images of the head were obtained without intravenous contrast.   FINDINGS: There is edema in the left occipital and temporal lobe compatible with acute infarct. Minimal hyperdensity at site of infarct may relate to cortical laminar necrosis or minimal petechial hemorrhage. There is mild encephalomalacia in right parietal lobe compatible with old infarct. No hydrocephalus. No evidence of acute intracranial hemorrhage. The paranasal sinuses are clear and well pneumatized. No evidence of depressed calvarial fracture.       Edema in the left occipital and temporal lobe compatible with acute infarct. Minimal hyperdensity at site of infarct may relate to laminar necrosis or minimal petechial hemorrhage.   MACRO: Zeus York discussed the significance and urgency of this critical finding  by telephone with  JOSH WARE on 12/14/2023 at 2:55 am. (**-RCF-**) Findings:  See findings.   Signed by: Zeus York 12/14/2023 2:55 AM Dictation workstation:   [id number]    CT angio chest for pulmonary embolism    Result Date: 12/13/2023  Interpreted By:  Mamadou Daigle, STUDY: CT ANGIO CHEST FOR PULMONARY EMBOLISM;  12/13/2023 7:13 pm   INDICATION: Signs/Symptoms:New onset atrial fibrillation, history of prior DVT.   COMPARISON: None   ACCESSION NUMBER(S): [id number]   ORDERING CLINICIAN: ASHLEY OSWALD   TECHNIQUE: Helical data acquisition of the chest was obtained after intravenous administration of intravenous contrast, as per PE protocol. Images were reformatted in coronal and sagittal planes. Axial and coronal maximum intensity projection (MIP) images were created and reviewed.   FINDINGS: POTENTIAL LIMITATIONS OF THE STUDY: Motion degradation   HEART AND VESSELS: There are no discrete filling defects within main pulmonary artery and its branches to suggest acute pulmonary embolism. Enlarged main pulmonary artery compatible with pulmonary arterial hypertension   The thoracic aorta normal in course and caliber. No coronary artery calcifications are seen. Please note, the study is not optimized for evaluation of coronary arteries. Heart size is mildly enlarged   MEDIASTINUM AND STANTON, LOWER NECK AND AXILLA: The visualized thyroid gland is within normal limits. No evidence of thoracic lymphadenopathy by CT criteria. Esophagus appears within normal limits as seen.   LUNGS AND AIRWAYS: The trachea and central airways are patent. No endobronchial lesion is seen.   The bilateral lungs are clear without evidence of focal consolidation, pleural effusion, or pneumothorax.     Demineralization. Slight sliding hiatal hernia. Fatty infiltration liver. Unremarkable adrenal glands small posterior left diaphragmatic hernia containing fat       1. No evidence of acute pulmonary embolism. Mild motion degradation. Heart  size is mildly enlarged. Mild bronchial thickening. No focal infiltrate     MACRO: None   Signed by: Mamadou Daigle 12/13/2023 7:44 PM Dictation workstation:   [id number]    XR chest 1 view    Result Date: 12/13/2023  Interpreted By:  Laura Gonzalez, STUDY: XR CHEST 1 VIEW;  12/13/2023 4:01 pm   INDICATION: Signs/Symptoms:Chest Pain.   COMPARISON: None.   ACCESSION NUMBER(S): [id number]   ORDERING CLINICIAN: BRADEN PIERCE   FINDINGS: CARDIOMEDIASTINAL SILHOUETTE: Cardiomediastinal silhouette is normal in size and configuration.     LUNGS: Lungs are clear.   ABDOMEN: No remarkable upper abdominal findings.     BONES: No acute osseous changes.       No acute cardiopulmonary process.   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  I have discussed this with Dr. Montaño.  Will need an outpatient ischemia workup in light of abnormal EKG.  OK for discharge cardiac wise, with follow up with me as an outpatient.  Please call with questions.    Code Status:  Full Code      Toribio Lomeli MD

## 2023-12-15 NOTE — CARE PLAN
The patient's goals for the shift include rest    The clinical goals for the shift include Patient will maintain HR <100 bpm by end of shift 12/15/23 at 0700.    Patient remains hemodynamically stable and safe throughout shift. No complaints of pain/chest pain overnight. She remains in afib with heart rates controlled throughout my shift.     Hourly rounding was performed and patient needs were met. No other acute events, will continue to monitor.

## 2023-12-15 NOTE — PROGRESS NOTES
Physical Therapy                 Therapy Communication Note    Patient Name: Tomasa Gutierres  MRN: [id number]  Today's Date: 12/15/2023     Discipline: Physical Therapy    Missed Time: Attempt    Comment:  PT orders received, chart reviewed.  Pt appears at baseline level of function.  Pt was observed ambulating around room independently without any LOB.  Pt denies any concerns with returning home upon DC from the hospital.  Will DC PT orders at this time.

## 2023-12-15 NOTE — PROGRESS NOTES
"Tomasa Gutierres is a 58 y.o. female on day 1 of admission presenting with New onset a-fib (CMS/HCC).    Subjective   No overnight events       Objective     Last Recorded Vitals  Blood pressure 116/67, pulse 62, temperature 36.1 °C (97 °F), resp. rate 20, height 1.753 m (5' 9\"), weight 73.3 kg (161 lb 9.6 oz), SpO2 97 %.    Physical Exam  Neurological Exam  Mental Status: Awake and alert. Oriented to person, place and time. Speech was fluent to history. Naming, repetition and comprehension were intact.   CN: VFF, PERRL, Rt exotropia. Facial sensation was intact to light touch bilaterally. Flattened L NLF. Palate elevated symmetrically. Shoulder shrug was symmetric. Tongue protruded midline.   Motor: Normal muscle bulk and tone. Strength was (R/L) 5/5 shoulder abduction, elbow flexion/extension,  strenght, hip flexion, knee flexion/extension, ankle dorsi- and plantar flexion. There were no abnormal movements.   Sensory: Intact to light touch in all 4 extremities.   Coordination: Finger to nose intact with no dysmetria.     Relevant Results  Scheduled medications  aspirin, 81 mg, oral, Daily  atorvastatin, 40 mg, oral, Nightly  [Held by provider] enoxaparin, 1 mg/kg, subcutaneous, q12h  nicotine, 1 patch, transdermal, Daily  perflutren lipid microspheres, 0.5-10 mL of dilution, intravenous, Once in imaging      Continuous medications     PRN medications  PRN medications: lubricating eye drops, melatonin, polyethylene glycol  Results for orders placed or performed during the hospital encounter of 12/13/23 (from the past 24 hour(s))   ECG 12 lead   Result Value Ref Range    Ventricular Rate 79 BPM    Atrial Rate 62 BPM    QRS Duration 84 ms    QT Interval 418 ms    QTC Calculation(Bazett) 479 ms    R Axis 78 degrees    T Axis 56 degrees    QRS Count 13 beats    Q Onset 222 ms    T Offset 431 ms    QTC Fredericia 458 ms   Magnesium   Result Value Ref Range    Magnesium 2.04 1.60 - 2.40 mg/dL   CBC   Result Value Ref " Range    WBC 6.2 4.4 - 11.3 x10*3/uL    nRBC 0.0 0.0 - 0.0 /100 WBCs    RBC 5.21 (H) 4.00 - 5.20 x10*6/uL    Hemoglobin 16.6 (H) 12.0 - 16.0 g/dL    Hematocrit 49.7 (H) 36.0 - 46.0 %    MCV 95 80 - 100 fL    MCH 31.9 26.0 - 34.0 pg    MCHC 33.4 32.0 - 36.0 g/dL    RDW 12.2 11.5 - 14.5 %    Platelets 230 150 - 450 x10*3/uL   Basic metabolic panel   Result Value Ref Range    Glucose 97 74 - 99 mg/dL    Sodium 135 (L) 136 - 145 mmol/L    Potassium 3.8 3.5 - 5.3 mmol/L    Chloride 104 98 - 107 mmol/L    Bicarbonate 22 21 - 32 mmol/L    Anion Gap 13 10 - 20 mmol/L    Urea Nitrogen 10 6 - 23 mg/dL    Creatinine 0.59 0.50 - 1.05 mg/dL    eGFR >90 >60 mL/min/1.73m*2    Calcium 8.6 8.6 - 10.3 mg/dL     MR brain wo IV contrast    Result Date: 12/14/2023  Interpreted By:  Cipriano Zhang, STUDY: MR ANGIO HEAD WO IV CONTRAST; MR BRAIN WO IV CONTRAST; MR ANGIO NECK WO IV CONTRAST   INDICATION: Signs/Symptoms:stroke   COMPARISON: Head CT performed on the same day   ACCESSION NUMBER(S): [id number]; [id number]; [id number]   ORDERING CLINICIAN: CRYSTAL WARE   TECHNIQUE: Multi-planar multi-sequential MR imaging of the brain was performed without intravenous contrast. MRA of the head using time-of-flight technique was performed without intravenous contrast. MRA of the neck using time-of-flight technique was performed without intravenous contrast.   FINDINGS: MRI BRAIN:   Acute infarct within the left PCA territory involving the medial aspect of the left temporal and occipital lobes, posterior aspect of the left para hippocampal gyrus, and posterior left thalamus. Additional small focus of infarct involving the lateral left splenium of the corpus callosum.   No hemorrhagic conversion. Mild mass effect with partial effacement of the atrium and occipital horn of the left lateral ventricle. No contralateral entrapment.   Chronic infarcts in bilateral cerebellar hemispheres.   No hydrocephalus.  No extra-axial fluid collections. The  skull base flow voids are present.   The visualized intraorbital contents are normal. The imaged portions of the paranasal sinuses are clear. The mastoid air cells are clear. The visualized osseous structures, soft tissues and partially visualized parotid glands appear normal.   MRA NECK:   Standard configuration of the aortic arch with no gross ostial stenosis of the great vessels.   The common carotid arteries are patent bilaterally without evidence of stenosis. There is no narrowing of the cervical internal carotid arteries bilaterally.   Right vertebral artery is diffusely diminutive in difficult to visualized. Left vertebral artery is dominant and patent.   MRA HEAD:   Normal distal internal carotid arteries.   High-grade stenosis versus occlusion in the left P2/P3 junction (series 6, image 79) with complete reconstitution of the remainder of the left P3 segment and P4 segment. Right PCA is normal. Bilateral MCAs and ACAs are normal.   Normal vertebrobasilar system.   No evidence of aneurysm or vascular malformation.       Acute infarct in the left PCA territory as detailed without associated hemorrhagic conversion. Mild associated mass effect with partial effacement of the atrium and occipital horn of the left lateral ventricle; no associated entrapment. No midline shift or herniation.   High-grade stenosis versus occlusion of the left P2/P3 junction with immediate reconstitution of distal left PCA branches.   Remainder of the cervical and intracranial vasculature is within normal limits. Variant anatomy as detailed.   _____________ NASCET criteria for internal carotid artery stenosis: Mild: 0% to 49% Moderate: 50% to 69% Severe: 70% to 99% Complete Occlusion   Signed by: Cipriano Zhang 12/14/2023 7:09 PM Dictation workstation:   [id number]    MR angio head wo IV contrast    Result Date: 12/14/2023  Interpreted By:  Cipriano Zhang, STUDY: MR ANGIO HEAD WO IV CONTRAST; MR BRAIN WO IV CONTRAST; MR ANGIO NECK WO  IV CONTRAST   INDICATION: Signs/Symptoms:stroke   COMPARISON: Head CT performed on the same day   ACCESSION NUMBER(S): [id number]; [id number]; [id number]   ORDERING CLINICIAN: CRYSTAL WARE   TECHNIQUE: Multi-planar multi-sequential MR imaging of the brain was performed without intravenous contrast. MRA of the head using time-of-flight technique was performed without intravenous contrast. MRA of the neck using time-of-flight technique was performed without intravenous contrast.   FINDINGS: MRI BRAIN:   Acute infarct within the left PCA territory involving the medial aspect of the left temporal and occipital lobes, posterior aspect of the left para hippocampal gyrus, and posterior left thalamus. Additional small focus of infarct involving the lateral left splenium of the corpus callosum.   No hemorrhagic conversion. Mild mass effect with partial effacement of the atrium and occipital horn of the left lateral ventricle. No contralateral entrapment.   Chronic infarcts in bilateral cerebellar hemispheres.   No hydrocephalus.  No extra-axial fluid collections. The skull base flow voids are present.   The visualized intraorbital contents are normal. The imaged portions of the paranasal sinuses are clear. The mastoid air cells are clear. The visualized osseous structures, soft tissues and partially visualized parotid glands appear normal.   MRA NECK:   Standard configuration of the aortic arch with no gross ostial stenosis of the great vessels.   The common carotid arteries are patent bilaterally without evidence of stenosis. There is no narrowing of the cervical internal carotid arteries bilaterally.   Right vertebral artery is diffusely diminutive in difficult to visualized. Left vertebral artery is dominant and patent.   MRA HEAD:   Normal distal internal carotid arteries.   High-grade stenosis versus occlusion in the left P2/P3 junction (series 6, image 79) with complete reconstitution of the remainder of the left  P3 segment and P4 segment. Right PCA is normal. Bilateral MCAs and ACAs are normal.   Normal vertebrobasilar system.   No evidence of aneurysm or vascular malformation.       Acute infarct in the left PCA territory as detailed without associated hemorrhagic conversion. Mild associated mass effect with partial effacement of the atrium and occipital horn of the left lateral ventricle; no associated entrapment. No midline shift or herniation.   High-grade stenosis versus occlusion of the left P2/P3 junction with immediate reconstitution of distal left PCA branches.   Remainder of the cervical and intracranial vasculature is within normal limits. Variant anatomy as detailed.   _____________ NASCET criteria for internal carotid artery stenosis: Mild: 0% to 49% Moderate: 50% to 69% Severe: 70% to 99% Complete Occlusion   Signed by: Cipriano Zhang 12/14/2023 7:09 PM Dictation workstation:   [id number]    MR angio neck wo IV contrast    Result Date: 12/14/2023  Interpreted By:  Cipriano Zhang, STUDY: MR ANGIO HEAD WO IV CONTRAST; MR BRAIN WO IV CONTRAST; MR ANGIO NECK WO IV CONTRAST   INDICATION: Signs/Symptoms:stroke   COMPARISON: Head CT performed on the same day   ACCESSION NUMBER(S): [id number]; [id number]; [id number]   ORDERING CLINICIAN: CRYSTAL WARE   TECHNIQUE: Multi-planar multi-sequential MR imaging of the brain was performed without intravenous contrast. MRA of the head using time-of-flight technique was performed without intravenous contrast. MRA of the neck using time-of-flight technique was performed without intravenous contrast.   FINDINGS: MRI BRAIN:   Acute infarct within the left PCA territory involving the medial aspect of the left temporal and occipital lobes, posterior aspect of the left para hippocampal gyrus, and posterior left thalamus. Additional small focus of infarct involving the lateral left splenium of the corpus callosum.   No hemorrhagic conversion. Mild mass effect with partial  effacement of the atrium and occipital horn of the left lateral ventricle. No contralateral entrapment.   Chronic infarcts in bilateral cerebellar hemispheres.   No hydrocephalus.  No extra-axial fluid collections. The skull base flow voids are present.   The visualized intraorbital contents are normal. The imaged portions of the paranasal sinuses are clear. The mastoid air cells are clear. The visualized osseous structures, soft tissues and partially visualized parotid glands appear normal.   MRA NECK:   Standard configuration of the aortic arch with no gross ostial stenosis of the great vessels.   The common carotid arteries are patent bilaterally without evidence of stenosis. There is no narrowing of the cervical internal carotid arteries bilaterally.   Right vertebral artery is diffusely diminutive in difficult to visualized. Left vertebral artery is dominant and patent.   MRA HEAD:   Normal distal internal carotid arteries.   High-grade stenosis versus occlusion in the left P2/P3 junction (series 6, image 79) with complete reconstitution of the remainder of the left P3 segment and P4 segment. Right PCA is normal. Bilateral MCAs and ACAs are normal.   Normal vertebrobasilar system.   No evidence of aneurysm or vascular malformation.       Acute infarct in the left PCA territory as detailed without associated hemorrhagic conversion. Mild associated mass effect with partial effacement of the atrium and occipital horn of the left lateral ventricle; no associated entrapment. No midline shift or herniation.   High-grade stenosis versus occlusion of the left P2/P3 junction with immediate reconstitution of distal left PCA branches.   Remainder of the cervical and intracranial vasculature is within normal limits. Variant anatomy as detailed.   _____________ NASCET criteria for internal carotid artery stenosis: Mild: 0% to 49% Moderate: 50% to 69% Severe: 70% to 99% Complete Occlusion   Signed by: Cipriano Zhang 12/14/2023  7:09 PM Dictation workstation:   [id number]    ECG 12 lead    Result Date: 12/14/2023  Atrial fibrillation Low voltage QRS Abnormal ECG No previous ECGs available    Transthoracic Echo (TTE) Complete    Result Date: 12/14/2023            [facility] [address] [address] [facility] [address]    Tel [phone] Fax [phone] TRANSTHORACIC ECHOCARDIOGRAM REPORT  Patient Name:      BRIANNE CARDOSO        Reading Physician:    [id number] Toribio Lomeli MD Study Date:        12/14/2023            Ordering Provider:    [id number] JOSH WARE MRN/PID:           [id number]              Fellow: Accession#:        [id number]          Nurse: Date of Birth/Age: 1965 / 58 years Sonographer:          Payton Rdz RDCS Gender:            F                     Additional Staff: Height:            175.00 cm             Admit Date: Weight:            72.58 kg              Admission Status:     Inpatient -                                                                Routine BSA:               1.88 m2               Department Location:  [facility] ICU Front                                                                (19-26) Blood Pressure: 124 /73 mmHg Study Type:    TRANSTHORACIC ECHO (TTE) COMPLETE Diagnosis/ICD: Unspecified atrial fibrillation-I48.91 Indication:    new onset afib  Study Detail: The following Echo studies were performed: 2D, M-Mode, Doppler and               color flow.  PHYSICIAN INTERPRETATION: Left Ventricle: Left ventricular systolic function is normal, with an estimated ejection fraction of 60-65%. There are no regional wall motion abnormalities. The left ventricular cavity size is normal. Spectral Doppler shows a normal pattern of left ventricular diastolic filling. Left Atrium: The left atrium is normal in size.  A bubble study using agitated saline was performed. Bubble study is negative. Right Ventricle: The right ventricle is normal in size. There is normal right ventricular global systolic function. Right Atrium: The right atrium is normal in size. Aortic Valve: The aortic valve appears structurally normal. There is no evidence of aortic valve regurgitation. The peak instantaneous gradient of the aortic valve is 4.1 mmHg. Mitral Valve: The mitral valve is normal in structure. There is mild mitral valve regurgitation. Tricuspid Valve: The tricuspid valve is structurally normal. There is trace tricuspid regurgitation. The estimated RVSP is 20 mm. Pulmonic Valve: The pulmonic valve is structurally normal. There is trace pulmonic valve regurgitation. Pericardium: There is no pericardial effusion noted. Aorta: The aortic root is normal.  CONCLUSIONS:  1. Left ventricular systolic function is normal with a 60-65% estimated ejection fraction.  2. The estimated RVSP is 20 mm.  3. A bubble study using agitated saline was performed. Bubble study is negative. QUANTITATIVE DATA SUMMARY: 2D MEASUREMENTS:                          Normal Ranges: LAs:           4.70 cm   (2.7-4.0cm) IVSd:          0.90 cm   (0.6-1.1cm) LVPWd:         1.00 cm   (0.6-1.1cm) LVIDd:         4.50 cm   (3.9-5.9cm) LVIDs:         3.10 cm LV Mass Index: 76.1 g/m2 LV % FS        31.1 % LA VOLUME:                             Normal Ranges: LA Area A4C:     18.0 cm2 LA Area A2C:     23.0 cm2 LA Volume Index: 31.0 ml/m2 M-MODE MEASUREMENTS:                  Normal Ranges: Ao Root: 3.30 cm (2.0-3.7cm) AoV Exc: 2.00 cm (1.5-2.5cm) AORTA MEASUREMENTS:                    Normal Ranges: AoV Exc:   2.00 cm (1.5-2.5cm) Asc Ao, d: 3.40 cm (2.1-3.4cm) LV SYSTOLIC FUNCTION BY 2D PLANIMETRY (MOD):                     Normal Ranges: EF-A4C View: 66.4 % (>=55%) EF-A2C View: 50.3 % EF-Biplane:  61.3 % LV DIASTOLIC FUNCTION:                        Normal Ranges: MV Peak E:     0.91 m/s (0.7-1.2 m/s) MV e'         0.08 m/s (>8.0) MV lateral e' 0.08 m/s MV medial e'  0.08 m/s E/e' Ratio:   12.07    (<8.0) MITRAL VALVE:                 Normal Ranges: MV DT: 153 msec (150-240msec) MITRAL INSUFFICIENCY:                      Normal Ranges: MR Vmax: 443.00 cm/s AORTIC VALVE:                         Normal Ranges: AoV Vmax:      1.01 m/s (<=1.7m/s) AoV Peak P.1 mmHg (<20mmHg) LVOT Max Oc:  0.67 m/s (<=1.1m/s) LVOT Diameter: 2.10 cm  (1.8-2.4cm) AoV Area,Vmax: 2.29 cm2 (2.5-4.5cm2)  RIGHT VENTRICLE: TAPSE: 15.6 mm RV s'  0.07 m/s TRICUSPID VALVE/RVSP:                             Normal Ranges: Peak TR Velocity: 2.04 m/s RV Syst Pressure: 19.6 mmHg (< 30mmHg)  [id number] Toribio Lomeli MD Electronically signed on 2023 at 11:15:33 AM  ** Final **     CT head wo IV contrast    Result Date: 2023  Interpreted By:  Heriberto Beltran, STUDY: CT HEAD WO IV CONTRAST;  2023 8:06 am   INDICATION: Signs/Symptoms:follow-up from previous CT head d/t infarct and questionable hemorrhage.   COMPARISON: 2023 time 1:57 a.m.   ACCESSION NUMBER(S): [id number]   ORDERING CLINICIAN: CRYSTAL WARE   TECHNIQUE: Axial CT images of the head were obtained without intravenous contrast administration.   FINDINGS: There is again evidence of abnormal hypodensity which appears to involve cortex and underlying white matter along the inferior left occipital lobe extending anteriorly into the posteroinferior left temporal lobe. There is associated mass effect with asymmetric effacement of surrounding sulci. The findings remain suspicious for an area of acute to early subacute infarction. There is again evidence of a similar small amount of hyperdensity associated with the area of abnormal hypodensity raising the possibility of a minimal amount of associated petechial hemorrhagic transformation.   An area of encephalomalacia/gliosis is again noted within the right parietal lobe.   Nonspecific white matter  changes are again noted within cerebral hemispheres bilaterally which while nonspecific, given the patient's age, likely represent sequelae of small-vessel ischemic change.   The ventricular system remains nondilated.   There is no midline shift.   The visualized paranasal sinuses and mastoid air cells are clear.   No acute fracture is noted.       There is again evidence of abnormal hypodensity which appears to involve cortex and underlying white matter along the inferior left occipital lobe extending anteriorly into the posteroinferior left temporal lobe. There is associated mass effect with asymmetric effacement of surrounding sulci. The findings remain suspicious for an area of acute to early subacute infarction. There is again evidence of a similar small amount of hyperdensity associated with the area of abnormal hypodensity raising the possibility of a minimal amount of associated petechial hemorrhagic transformation.   An area of encephalomalacia/gliosis is again noted within the right parietal lobe.   Nonspecific white matter changes are again noted within cerebral hemispheres bilaterally which while nonspecific, given the patient's age, likely represent sequelae of small-vessel ischemic change.   The ventricular system remains nondilated.   MACRO: None.   Signed by: Heriberto Beltran 12/14/2023 8:20 AM Dictation workstation:   [id number]    CT head wo IV contrast    Result Date: 12/14/2023  Interpreted By:  Zeus York, STUDY: CT HEAD WO IV CONTRAST;  12/14/2023 2:00 am   INDICATION: Signs/Symptoms:headache, visual disturbances.   COMPARISON: None   ACCESSION NUMBER(S): [id number]   ORDERING CLINICIAN: CRYSTAL WARE   TECHNIQUE: Contiguous axial images of the head were obtained without intravenous contrast.   FINDINGS: There is edema in the left occipital and temporal lobe compatible with acute infarct. Minimal hyperdensity at site of infarct may relate to cortical laminar necrosis or minimal petechial  hemorrhage. There is mild encephalomalacia in right parietal lobe compatible with old infarct. No hydrocephalus. No evidence of acute intracranial hemorrhage. The paranasal sinuses are clear and well pneumatized. No evidence of depressed calvarial fracture.       Edema in the left occipital and temporal lobe compatible with acute infarct. Minimal hyperdensity at site of infarct may relate to laminar necrosis or minimal petechial hemorrhage.   MACRO: Zeus York discussed the significance and urgency of this critical finding by telephone with  JOSH WARE on 12/14/2023 at 2:55 am. (**-RCF-**) Findings:  See findings.   Signed by: Zeus York 12/14/2023 2:55 AM Dictation workstation:   [id number]    CT angio chest for pulmonary embolism    Result Date: 12/13/2023  Interpreted By:  Mamadou Daigle, STUDY: CT ANGIO CHEST FOR PULMONARY EMBOLISM;  12/13/2023 7:13 pm   INDICATION: Signs/Symptoms:New onset atrial fibrillation, history of prior DVT.   COMPARISON: None   ACCESSION NUMBER(S): [id number]   ORDERING CLINICIAN: ASHLEY OSWALD   TECHNIQUE: Helical data acquisition of the chest was obtained after intravenous administration of intravenous contrast, as per PE protocol. Images were reformatted in coronal and sagittal planes. Axial and coronal maximum intensity projection (MIP) images were created and reviewed.   FINDINGS: POTENTIAL LIMITATIONS OF THE STUDY: Motion degradation   HEART AND VESSELS: There are no discrete filling defects within main pulmonary artery and its branches to suggest acute pulmonary embolism. Enlarged main pulmonary artery compatible with pulmonary arterial hypertension   The thoracic aorta normal in course and caliber. No coronary artery calcifications are seen. Please note, the study is not optimized for evaluation of coronary arteries. Heart size is mildly enlarged   MEDIASTINUM AND STANTON, LOWER NECK AND AXILLA: The visualized thyroid gland is within normal limits. No evidence of thoracic  lymphadenopathy by CT criteria. Esophagus appears within normal limits as seen.   LUNGS AND AIRWAYS: The trachea and central airways are patent. No endobronchial lesion is seen.   The bilateral lungs are clear without evidence of focal consolidation, pleural effusion, or pneumothorax.     Demineralization. Slight sliding hiatal hernia. Fatty infiltration liver. Unremarkable adrenal glands small posterior left diaphragmatic hernia containing fat       1. No evidence of acute pulmonary embolism. Mild motion degradation. Heart size is mildly enlarged. Mild bronchial thickening. No focal infiltrate     MACRO: None   Signed by: Mamadou Daigle 12/13/2023 7:44 PM Dictation workstation:   [id number]    XR chest 1 view    Result Date: 12/13/2023  Interpreted By:  Laura Gonzalez, STUDY: XR CHEST 1 VIEW;  12/13/2023 4:01 pm   INDICATION: Signs/Symptoms:Chest Pain.   COMPARISON: None.   ACCESSION NUMBER(S): [id number]   ORDERING CLINICIAN: BRADEN PIERCE   FINDINGS: CARDIOMEDIASTINAL SILHOUETTE: Cardiomediastinal silhouette is normal in size and configuration.     LUNGS: Lungs are clear.   ABDOMEN: No remarkable upper abdominal findings.     BONES: No acute osseous changes.       No acute cardiopulmonary process.   MACRO: None   Signed by: Laura Gonzalez 12/13/2023 4:12 PM Dictation workstation:   [id number]       NIH Stroke Scale  1A. Level of Consciousness: Alert, Keenly Responsive  1B. Ask Month and Age: Both Questions Right  1C. Blink Eyes & Squeeze Hands: Performs Both Tasks  2. Best Gaze: Normal  3. Visual: No Visual Loss  4. Facial Palsy: Minor Paralysis  5A. Motor - Left Arm: No Drift  5B. Motor - Right Arm: No Drift  6A. Motor - Left Leg: No Drift  6B. Motor - Right Leg: No Drift  7. Limb Ataxia: Absent  8. Sensory Loss: Normal  9. Best Language: No Aphasia  10. Dysarthria: Normal  11. Extinction and Inattention: No Abnormality  NIH Stroke Scale: 1            Assessment/Plan   This patient currently has  cardiac telemetry ordered; if you would like to modify or discontinue the telemetry order, click here to go to the orders activity to modify/discontinue the order.  Principal Problem:    New onset a-fib (CMS/HCC)  Active Problems:    Elevated brain natriuretic peptide (BNP) level    Hyponatremia    HTN (hypertension)    Headache    Visual disturbance    Nicotine dependence    Mass of submandibular region    Excessive involuntary blinking    New onset atrial fibrillation (CMS/HCC)    - Acute ischemic stroke- cardioembolic in setting of new onset a-fib  - Migraine with aura  - Remote DVT    PLAN:    Start aspirin 81mg daily today; start Eliquis 5mg BID tomorrow.   Continue statin.  Cardiac telemetry  Neuro checks every shift  PT/OT  Stroke education, smoking cessation; aggressive mgmt of vascular risk factors.  No work or driving restrictions from neuro standpoint.  Follow up with Dr Montaño in 4 to 6 weeks.    Pt counseled and educated about his/her underlying neurological condition and management plan, including stroke/vascular risk factors, recognizing stroke symptoms and calling 911, secondary stroke prevention, risk factor modification, bleeding risk, medication compliance, and follow up appointments.    Bleeding risk and the precautions as well as the signs of the risk factor of CVA were discussed with patient.    Long term goals:  B/P < 120/70mmHG  HgbA1c <5.6  LDL < 70  Triglycerides < 150  Weight loss with BMI < 25    Case/plan discussed with DR. Montaño.               I spent 35 minutes in the professional and overall care of this patient.      Lee Ann Guzman, APRN-CNP

## 2023-12-15 NOTE — PROGRESS NOTES
"Nutrition Diet Education    Dietitian discretion   Auto referral s/p stroke.     Nutrition Note:  Tomasa Gutierres is a 58 y.o. female presenting 12/13 with irregular heart rhythm; work up revealing new onset A fib and acute left occipital and temporal lobe ischemic stroke with moderate hemorrhagic transformation.  Cardiology and Neurology involved in pt care.      Past Medical History   has a past medical history of Closed fracture of upper end of humerus (11/03/2015), Dry eyes, DVT (deep venous thrombosis) (CMS/HCC), Elevated liver enzymes (12/13/2023), Intermittent exotropia of right eye, Migraines, Pre-hypertension (12/13/2023), Scoliosis, Shoulder fracture, left, and Stroke (CMS/HCC).  Surgical History   has a past surgical history that includes Fracture surgery; Eye surgery (Right); Ectopic pregnancy surgery; Hernia repair; MR angio head wo IV contrast (12/14/2023); and MR angio neck wo IV contrast (12/14/2023).   Education Documentation  Nutrition Care Manual, taught by Nuria Boyer RD at 12/15/2023  1:44 PM.  Learner: Family, Patient  Readiness: Eager  Method: Explanation, Handout  Response: Verbalizes Understanding  Comment: \"Cholesterol Score\" per AHA and \"Building A Heart Healthy Plate\" per National Lipid Association.      12/15: Met with pt and family at bedside. Rationale for cardiac diet discussed as well as reviewed types of fat. Reviewed results of the lipid panel and HA1C; pt states HDL in the past had been >50 however admits to being more sedentary lately; once feels better and cleared by Cardiology/Neurology, pt aware to resume active lifestyle.     Follow Up:     Time Spent (min): 30 minutes  Follow up: Provided information on outpatient nutrition therapy services, Provided inpatient RDN contact information  Last Date of Nutrition Visit: 12/15/23  Nutrition Follow-Up Needed?: 7-10 days  Follow up Comment: ks, stroke edu done; reconsult as needed.   "

## 2023-12-15 NOTE — NURSING NOTE
Spoke with CHARLES Joaquin with [facility], patient good to be discharged per Dr Alcazar. Patient agreeable to discharge, orders received. IV taken out successfully. Coupon given for new prescription for eliquis. Stroke education given. All other paperwork given and questions answered. Patient to drive self home, okay per neurology.

## 2023-12-15 NOTE — PROGRESS NOTES
Occupational Therapy                 Therapy Communication Note    Patient Name: Tomasa Gutierres  MRN: [id number]  Today's Date: 12/15/2023     Discipline: Occupational Therapy    Comment: Received orders for OT eval 12/14. Completed chart review and completed functional screen. Pt has been up independent with ADL's and mobility and does not have any OT concerns prior to returning home. Will discharge OT services at this time.

## 2023-12-16 NOTE — DISCHARGE SUMMARY
Discharge Diagnosis  New onset a-fib (CMS/HCC), Acute ischemic stroke, Migraine with aura    Issues Requiring Follow-Up  New onset a-fib (CMS/HCC), Acute ischemic stroke, Migraine with aura    Discharge Meds     Your medication list        START taking these medications        Instructions Last Dose Given Next Dose Due   apixaban 5 mg tablet  Commonly known as: Eliquis  Start taking on: December 16, 2023      Take 1 tablet (5 mg) by mouth every 12 hours. Start in the morning on 12/16/2023 Do not start before December 16, 2023.       aspirin 81 mg chewable tablet  Start taking on: December 16, 2023      Chew 1 tablet (81 mg) once daily. Do not start before December 16, 2023.       atorvastatin 40 mg tablet  Commonly known as: Lipitor      Take 1 tablet (40 mg) by mouth once daily at bedtime.                 Where to Get Your Medications        These medications were sent to [id number] - [place], [place] - [address]  [address], [facility] [address]      Phone: [phone]   apixaban 5 mg tablet  aspirin 81 mg chewable tablet  atorvastatin 40 mg tablet         Test Results Pending At Discharge  Pending Labs       No current pending labs.            Hospital Course   Pt was admitted due to new onset of A fib and visual disturbance, further work up showed acute stroke, is being discharged home for further care and follow up as out pt.    Pertinent Physical Exam At Time of Discharge  Physical Exam  HENT:      Head: Normocephalic.      Mouth/Throat:      Pharynx: Oropharynx is clear.   Eyes:      Conjunctiva/sclera: Conjunctivae normal.   Cardiovascular:      Rate and Rhythm: Regular rhythm.   Pulmonary:      Breath sounds: Normal breath sounds.   Abdominal:      General: Bowel sounds are normal.      Palpations: Abdomen is soft.   Musculoskeletal:         General: Normal range of motion.   Skin:     General: Skin is warm and dry.   Neurological:      General: No focal deficit present.      Mental  Status: She is alert.   Psychiatric:         Behavior: Behavior normal.         Outpatient Follow-Up  PCP, Cardiology and Neurology.      Jeremy Grimes MD

## 2024-01-01 ENCOUNTER — HOSPITAL ENCOUNTER (OUTPATIENT)
Facility: HOSPITAL | Age: 59
Setting detail: OBSERVATION
Discharge: HOME | End: 2024-01-03
Attending: EMERGENCY MEDICINE | Admitting: INTERNAL MEDICINE
Payer: COMMERCIAL

## 2024-01-01 DIAGNOSIS — Z86.73 HISTORY OF LEFT PCA STROKE: ICD-10-CM

## 2024-01-01 DIAGNOSIS — I49.5 SICK SINUS SYNDROME (MULTI): ICD-10-CM

## 2024-01-01 DIAGNOSIS — R55 SYNCOPE AND COLLAPSE: ICD-10-CM

## 2024-01-01 DIAGNOSIS — Z78.9 ALCOHOL USE: ICD-10-CM

## 2024-01-01 DIAGNOSIS — F10.920 ALCOHOLIC INTOXICATION WITHOUT COMPLICATION (CMS-HCC): ICD-10-CM

## 2024-01-01 DIAGNOSIS — I48.11 LONGSTANDING PERSISTENT ATRIAL FIBRILLATION (MULTI): ICD-10-CM

## 2024-01-01 DIAGNOSIS — I48.20 CHRONIC ATRIAL FIBRILLATION (MULTI): Primary | ICD-10-CM

## 2024-01-01 PROCEDURE — 99285 EMERGENCY DEPT VISIT HI MDM: CPT | Performed by: EMERGENCY MEDICINE

## 2024-01-01 SDOH — HEALTH STABILITY: MENTAL HEALTH: BEHAVIORS/MOOD: ANXIOUS;COOPERATIVE;FLAT AFFECT

## 2024-01-01 ASSESSMENT — LIFESTYLE VARIABLES
EVER FELT BAD OR GUILTY ABOUT YOUR DRINKING: NO
HAVE PEOPLE ANNOYED YOU BY CRITICIZING YOUR DRINKING: NO
REASON UNABLE TO ASSESS: NO
HAVE YOU EVER FELT YOU SHOULD CUT DOWN ON YOUR DRINKING: NO
EVER HAD A DRINK FIRST THING IN THE MORNING TO STEADY YOUR NERVES TO GET RID OF A HANGOVER: NO

## 2024-01-01 ASSESSMENT — COLUMBIA-SUICIDE SEVERITY RATING SCALE - C-SSRS
1. IN THE PAST MONTH, HAVE YOU WISHED YOU WERE DEAD OR WISHED YOU COULD GO TO SLEEP AND NOT WAKE UP?: NO
6. HAVE YOU EVER DONE ANYTHING, STARTED TO DO ANYTHING, OR PREPARED TO DO ANYTHING TO END YOUR LIFE?: NO
2. HAVE YOU ACTUALLY HAD ANY THOUGHTS OF KILLING YOURSELF?: NO

## 2024-01-01 ASSESSMENT — PAIN SCALES - GENERAL: PAINLEVEL_OUTOF10: 0 - NO PAIN

## 2024-01-01 ASSESSMENT — PAIN - FUNCTIONAL ASSESSMENT: PAIN_FUNCTIONAL_ASSESSMENT: 0-10

## 2024-01-02 ENCOUNTER — APPOINTMENT (OUTPATIENT)
Dept: RADIOLOGY | Facility: HOSPITAL | Age: 59
End: 2024-01-02
Payer: COMMERCIAL

## 2024-01-02 ENCOUNTER — APPOINTMENT (OUTPATIENT)
Dept: CARDIOLOGY | Facility: HOSPITAL | Age: 59
End: 2024-01-02
Payer: COMMERCIAL

## 2024-01-02 ENCOUNTER — HOSPITAL ENCOUNTER (OUTPATIENT)
Dept: NEUROLOGY | Facility: HOSPITAL | Age: 59
Setting detail: OBSERVATION
Discharge: HOME | End: 2024-01-02
Payer: COMMERCIAL

## 2024-01-02 PROBLEM — Z78.9 ALCOHOL USE: Status: ACTIVE | Noted: 2024-01-02

## 2024-01-02 PROBLEM — R55 SYNCOPE AND COLLAPSE: Status: ACTIVE | Noted: 2024-01-02

## 2024-01-02 PROBLEM — I63.532 CEREBROVASCULAR ACCIDENT (CVA) DUE TO OCCLUSION OF LEFT POSTERIOR CEREBRAL ARTERY (MULTI): Status: ACTIVE | Noted: 2024-01-02

## 2024-01-02 PROBLEM — Z86.73 HISTORY OF LEFT PCA STROKE: Status: ACTIVE | Noted: 2024-01-02

## 2024-01-02 PROBLEM — F10.90 ALCOHOL USE: Status: ACTIVE | Noted: 2024-01-02

## 2024-01-02 LAB
ALBUMIN SERPL BCP-MCNC: 4 G/DL (ref 3.4–5)
ALP SERPL-CCNC: 86 U/L (ref 33–136)
ALT SERPL W P-5'-P-CCNC: 19 U/L (ref 7–45)
AMPHETAMINES UR QL SCN: NORMAL
ANION GAP SERPL CALC-SCNC: 15 MMOL/L (ref 10–20)
APAP SERPL-MCNC: <10 UG/ML
APPEARANCE UR: ABNORMAL
AST SERPL W P-5'-P-CCNC: 24 U/L (ref 9–39)
BARBITURATES UR QL SCN: NORMAL
BASOPHILS # BLD AUTO: 0.04 X10*3/UL (ref 0–0.1)
BASOPHILS NFR BLD AUTO: 0.6 %
BENZODIAZ UR QL SCN: NORMAL
BILIRUB SERPL-MCNC: 0.6 MG/DL (ref 0–1.2)
BILIRUB UR STRIP.AUTO-MCNC: NEGATIVE MG/DL
BUN SERPL-MCNC: 6 MG/DL (ref 6–23)
BZE UR QL SCN: NORMAL
CALCIUM SERPL-MCNC: 9.3 MG/DL (ref 8.6–10.3)
CANNABINOIDS UR QL SCN: NORMAL
CARDIAC TROPONIN I PNL SERPL HS: 5 NG/L (ref 0–13)
CARDIAC TROPONIN I PNL SERPL HS: 5 NG/L (ref 0–13)
CHLORIDE SERPL-SCNC: 108 MMOL/L (ref 98–107)
CO2 SERPL-SCNC: 24 MMOL/L (ref 21–32)
COLOR UR: ABNORMAL
CREAT SERPL-MCNC: 0.52 MG/DL (ref 0.5–1.05)
EOSINOPHIL # BLD AUTO: 0.12 X10*3/UL (ref 0–0.7)
EOSINOPHIL NFR BLD AUTO: 1.8 %
ERYTHROCYTE [DISTWIDTH] IN BLOOD BY AUTOMATED COUNT: 11.9 % (ref 11.5–14.5)
ETHANOL SERPL-MCNC: 90 MG/DL
FENTANYL+NORFENTANYL UR QL SCN: NORMAL
GFR SERPL CREATININE-BSD FRML MDRD: >90 ML/MIN/1.73M*2
GLUCOSE SERPL-MCNC: 91 MG/DL (ref 74–99)
GLUCOSE UR STRIP.AUTO-MCNC: NEGATIVE MG/DL
HCT VFR BLD AUTO: 48.6 % (ref 36–46)
HGB BLD-MCNC: 16.2 G/DL (ref 12–16)
HOLD SPECIMEN: NORMAL
IMM GRANULOCYTES # BLD AUTO: 0.02 X10*3/UL (ref 0–0.7)
IMM GRANULOCYTES NFR BLD AUTO: 0.3 % (ref 0–0.9)
KETONES UR STRIP.AUTO-MCNC: NEGATIVE MG/DL
LEUKOCYTE ESTERASE UR QL STRIP.AUTO: NEGATIVE
LYMPHOCYTES # BLD AUTO: 2.44 X10*3/UL (ref 1.2–4.8)
LYMPHOCYTES NFR BLD AUTO: 37.6 %
MAGNESIUM SERPL-MCNC: 1.84 MG/DL (ref 1.6–2.4)
MCH RBC QN AUTO: 31.5 PG (ref 26–34)
MCHC RBC AUTO-ENTMCNC: 33.3 G/DL (ref 32–36)
MCV RBC AUTO: 95 FL (ref 80–100)
MONOCYTES # BLD AUTO: 0.42 X10*3/UL (ref 0.1–1)
MONOCYTES NFR BLD AUTO: 6.5 %
NEUTROPHILS # BLD AUTO: 3.45 X10*3/UL (ref 1.2–7.7)
NEUTROPHILS NFR BLD AUTO: 53.2 %
NITRITE UR QL STRIP.AUTO: NEGATIVE
NRBC BLD-RTO: 0 /100 WBCS (ref 0–0)
OPIATES UR QL SCN: NORMAL
OXYCODONE+OXYMORPHONE UR QL SCN: NORMAL
PCP UR QL SCN: NORMAL
PH UR STRIP.AUTO: 7 [PH]
PHOSPHATE SERPL-MCNC: 3.2 MG/DL (ref 2.5–4.9)
PLATELET # BLD AUTO: 250 X10*3/UL (ref 150–450)
POTASSIUM SERPL-SCNC: 3.9 MMOL/L (ref 3.5–5.3)
PROT SERPL-MCNC: 7 G/DL (ref 6.4–8.2)
PROT UR STRIP.AUTO-MCNC: NEGATIVE MG/DL
RBC # BLD AUTO: 5.14 X10*6/UL (ref 4–5.2)
RBC # UR STRIP.AUTO: NEGATIVE /UL
SALICYLATES SERPL-MCNC: <3 MG/DL
SODIUM SERPL-SCNC: 143 MMOL/L (ref 136–145)
SP GR UR STRIP.AUTO: 1
UROBILINOGEN UR STRIP.AUTO-MCNC: <2 MG/DL
WBC # BLD AUTO: 6.5 X10*3/UL (ref 4.4–11.3)

## 2024-01-02 PROCEDURE — 70551 MRI BRAIN STEM W/O DYE: CPT | Performed by: STUDENT IN AN ORGANIZED HEALTH CARE EDUCATION/TRAINING PROGRAM

## 2024-01-02 PROCEDURE — 2500000001 HC RX 250 WO HCPCS SELF ADMINISTERED DRUGS (ALT 637 FOR MEDICARE OP): Performed by: EMERGENCY MEDICINE

## 2024-01-02 PROCEDURE — 72125 CT NECK SPINE W/O DYE: CPT | Performed by: STUDENT IN AN ORGANIZED HEALTH CARE EDUCATION/TRAINING PROGRAM

## 2024-01-02 PROCEDURE — 2500000004 HC RX 250 GENERAL PHARMACY W/ HCPCS (ALT 636 FOR OP/ED): Performed by: NURSE PRACTITIONER

## 2024-01-02 PROCEDURE — 36415 COLL VENOUS BLD VENIPUNCTURE: CPT | Performed by: EMERGENCY MEDICINE

## 2024-01-02 PROCEDURE — G0378 HOSPITAL OBSERVATION PER HR: HCPCS

## 2024-01-02 PROCEDURE — 95816 EEG AWAKE AND DROWSY: CPT

## 2024-01-02 PROCEDURE — 70450 CT HEAD/BRAIN W/O DYE: CPT

## 2024-01-02 PROCEDURE — 84100 ASSAY OF PHOSPHORUS: CPT | Performed by: NURSE PRACTITIONER

## 2024-01-02 PROCEDURE — 84484 ASSAY OF TROPONIN QUANT: CPT | Performed by: EMERGENCY MEDICINE

## 2024-01-02 PROCEDURE — 80307 DRUG TEST PRSMV CHEM ANLYZR: CPT | Performed by: EMERGENCY MEDICINE

## 2024-01-02 PROCEDURE — 80143 DRUG ASSAY ACETAMINOPHEN: CPT | Performed by: EMERGENCY MEDICINE

## 2024-01-02 PROCEDURE — 95816 EEG AWAKE AND DROWSY: CPT | Performed by: PSYCHIATRY & NEUROLOGY

## 2024-01-02 PROCEDURE — 81003 URINALYSIS AUTO W/O SCOPE: CPT | Mod: 59 | Performed by: EMERGENCY MEDICINE

## 2024-01-02 PROCEDURE — 80179 DRUG ASSAY SALICYLATE: CPT | Performed by: EMERGENCY MEDICINE

## 2024-01-02 PROCEDURE — 96361 HYDRATE IV INFUSION ADD-ON: CPT | Performed by: INTERNAL MEDICINE

## 2024-01-02 PROCEDURE — 99222 1ST HOSP IP/OBS MODERATE 55: CPT | Performed by: NURSE PRACTITIONER

## 2024-01-02 PROCEDURE — 93005 ELECTROCARDIOGRAM TRACING: CPT

## 2024-01-02 PROCEDURE — 2500000001 HC RX 250 WO HCPCS SELF ADMINISTERED DRUGS (ALT 637 FOR MEDICARE OP): Performed by: NURSE PRACTITIONER

## 2024-01-02 PROCEDURE — 96360 HYDRATION IV INFUSION INIT: CPT | Mod: 59 | Performed by: INTERNAL MEDICINE

## 2024-01-02 PROCEDURE — 72125 CT NECK SPINE W/O DYE: CPT

## 2024-01-02 PROCEDURE — 85025 COMPLETE CBC W/AUTO DIFF WBC: CPT | Performed by: EMERGENCY MEDICINE

## 2024-01-02 PROCEDURE — 83735 ASSAY OF MAGNESIUM: CPT | Performed by: EMERGENCY MEDICINE

## 2024-01-02 PROCEDURE — 80053 COMPREHEN METABOLIC PANEL: CPT | Performed by: EMERGENCY MEDICINE

## 2024-01-02 PROCEDURE — 71045 X-RAY EXAM CHEST 1 VIEW: CPT | Performed by: STUDENT IN AN ORGANIZED HEALTH CARE EDUCATION/TRAINING PROGRAM

## 2024-01-02 PROCEDURE — 71045 X-RAY EXAM CHEST 1 VIEW: CPT

## 2024-01-02 PROCEDURE — 70450 CT HEAD/BRAIN W/O DYE: CPT | Performed by: STUDENT IN AN ORGANIZED HEALTH CARE EDUCATION/TRAINING PROGRAM

## 2024-01-02 PROCEDURE — 70551 MRI BRAIN STEM W/O DYE: CPT

## 2024-01-02 RX ORDER — LANOLIN ALCOHOL/MO/W.PET/CERES
400 CREAM (GRAM) TOPICAL ONCE
Status: COMPLETED | OUTPATIENT
Start: 2024-01-02 | End: 2024-01-02

## 2024-01-02 RX ORDER — LANOLIN ALCOHOL/MO/W.PET/CERES
100 CREAM (GRAM) TOPICAL 3 TIMES DAILY
Status: DISCONTINUED | OUTPATIENT
Start: 2024-01-02 | End: 2024-01-03 | Stop reason: HOSPADM

## 2024-01-02 RX ORDER — ACETAMINOPHEN 325 MG/1
650 TABLET ORAL EVERY 6 HOURS PRN
Status: DISCONTINUED | OUTPATIENT
Start: 2024-01-02 | End: 2024-01-03 | Stop reason: HOSPADM

## 2024-01-02 RX ORDER — TALC
3 POWDER (GRAM) TOPICAL DAILY PRN
Status: DISCONTINUED | OUTPATIENT
Start: 2024-01-02 | End: 2024-01-03 | Stop reason: HOSPADM

## 2024-01-02 RX ORDER — LORAZEPAM 1 MG/1
1 TABLET ORAL ONCE
Status: COMPLETED | OUTPATIENT
Start: 2024-01-02 | End: 2024-01-02

## 2024-01-02 RX ORDER — IBUPROFEN 200 MG
1 TABLET ORAL DAILY
Status: DISCONTINUED | OUTPATIENT
Start: 2024-01-02 | End: 2024-01-03 | Stop reason: HOSPADM

## 2024-01-02 RX ORDER — SODIUM CHLORIDE 9 MG/ML
75 INJECTION, SOLUTION INTRAVENOUS CONTINUOUS
Status: ACTIVE | OUTPATIENT
Start: 2024-01-02 | End: 2024-01-02

## 2024-01-02 RX ORDER — ATORVASTATIN CALCIUM 40 MG/1
40 TABLET, FILM COATED ORAL NIGHTLY
Status: DISCONTINUED | OUTPATIENT
Start: 2024-01-02 | End: 2024-01-03 | Stop reason: HOSPADM

## 2024-01-02 RX ORDER — POLYETHYLENE GLYCOL 3350 17 G/17G
17 POWDER, FOR SOLUTION ORAL DAILY PRN
Status: DISCONTINUED | OUTPATIENT
Start: 2024-01-02 | End: 2024-01-03 | Stop reason: HOSPADM

## 2024-01-02 RX ORDER — NAPROXEN SODIUM 220 MG/1
81 TABLET, FILM COATED ORAL DAILY
Status: DISCONTINUED | OUTPATIENT
Start: 2024-01-02 | End: 2024-01-03 | Stop reason: HOSPADM

## 2024-01-02 RX ADMIN — ASPIRIN 81 MG CHEWABLE TABLET 81 MG: 81 TABLET CHEWABLE at 13:13

## 2024-01-02 RX ADMIN — LORAZEPAM 1 MG: 1 TABLET ORAL at 00:40

## 2024-01-02 RX ADMIN — Medication 400 MG: at 04:39

## 2024-01-02 RX ADMIN — THIAMINE HCL TAB 100 MG 100 MG: 100 TAB at 21:45

## 2024-01-02 RX ADMIN — ATORVASTATIN CALCIUM 40 MG: 40 TABLET, FILM COATED ORAL at 21:45

## 2024-01-02 RX ADMIN — THIAMINE HCL TAB 100 MG 100 MG: 100 TAB at 17:12

## 2024-01-02 RX ADMIN — SODIUM CHLORIDE 75 ML/HR: 9 INJECTION, SOLUTION INTRAVENOUS at 04:39

## 2024-01-02 RX ADMIN — APIXABAN 5 MG: 5 TABLET, FILM COATED ORAL at 13:14

## 2024-01-02 SDOH — SOCIAL STABILITY: SOCIAL INSECURITY: HAS ANYONE EVER THREATENED TO HURT YOUR FAMILY OR YOUR PETS?: NO

## 2024-01-02 SDOH — SOCIAL STABILITY: SOCIAL INSECURITY: WERE YOU ABLE TO COMPLETE ALL THE BEHAVIORAL HEALTH SCREENINGS?: YES

## 2024-01-02 SDOH — SOCIAL STABILITY: SOCIAL INSECURITY: DO YOU FEEL UNSAFE GOING BACK TO THE PLACE WHERE YOU ARE LIVING?: NO

## 2024-01-02 SDOH — SOCIAL STABILITY: SOCIAL INSECURITY: ARE THERE ANY APPARENT SIGNS OF INJURIES/BEHAVIORS THAT COULD BE RELATED TO ABUSE/NEGLECT?: NO

## 2024-01-02 SDOH — SOCIAL STABILITY: SOCIAL INSECURITY: HAVE YOU HAD THOUGHTS OF HARMING ANYONE ELSE?: NO

## 2024-01-02 SDOH — SOCIAL STABILITY: SOCIAL INSECURITY: ABUSE: ADULT

## 2024-01-02 SDOH — SOCIAL STABILITY: SOCIAL INSECURITY: DO YOU FEEL ANYONE HAS EXPLOITED OR TAKEN ADVANTAGE OF YOU FINANCIALLY OR OF YOUR PERSONAL PROPERTY?: NO

## 2024-01-02 SDOH — SOCIAL STABILITY: SOCIAL INSECURITY: DOES ANYONE TRY TO KEEP YOU FROM HAVING/CONTACTING OTHER FRIENDS OR DOING THINGS OUTSIDE YOUR HOME?: NO

## 2024-01-02 SDOH — SOCIAL STABILITY: SOCIAL INSECURITY: ARE YOU OR HAVE YOU BEEN THREATENED OR ABUSED PHYSICALLY, EMOTIONALLY, OR SEXUALLY BY ANYONE?: NO

## 2024-01-02 ASSESSMENT — LIFESTYLE VARIABLES
HOW MANY STANDARD DRINKS CONTAINING ALCOHOL DO YOU HAVE ON A TYPICAL DAY: 1 OR 2
HOW OFTEN DURING THE LAST YEAR HAVE YOU FOUND THAT YOU WERE NOT ABLE TO STOP DRINKING ONCE YOU HAD STARTED: NEVER
HOW OFTEN DURING THE LAST YEAR HAVE YOU NEEDED AN ALCOHOLIC DRINK FIRST THING IN THE MORNING TO GET YOURSELF GOING AFTER A NIGHT OF HEAVY DRINKING: NEVER
SKIP TO QUESTIONS 9-10: 1
AUDIT-C TOTAL SCORE: 3
HAS A RELATIVE, FRIEND, DOCTOR, OR ANOTHER HEALTH PROFESSIONAL EXPRESSED CONCERN ABOUT YOUR DRINKING OR SUGGESTED YOU CUT DOWN: NO
HOW OFTEN DURING THE LAST YEAR HAVE YOU BEEN UNABLE TO REMEMBER WHAT HAPPENED THE NIGHT BEFORE BECAUSE YOU HAD BEEN DRINKING: NEVER
HOW OFTEN DO YOU HAVE A DRINK CONTAINING ALCOHOL: 2-3 TIMES A WEEK
AUDIT TOTAL SCORE: 3
HOW OFTEN DURING THE LAST YEAR HAVE YOU HAD A FEELING OF GUILT OR REMORSE AFTER DRINKING: NEVER
HOW OFTEN DO YOU HAVE 6 OR MORE DRINKS ON ONE OCCASION: NEVER
AUDIT-C TOTAL SCORE: 3
SUBSTANCE_ABUSE_PAST_12_MONTHS: NO
PRESCIPTION_ABUSE_PAST_12_MONTHS: NO
HAVE YOU OR SOMEONE ELSE BEEN INJURED AS A RESULT OF YOUR DRINKING: NO
HOW OFTEN DURING THE LAST YEAR HAVE YOU FAILED TO DO WHAT WAS NORMALLY EXPECTED FROM YOU BECAUSE OF DRINKING: NEVER
AUDIT TOTAL SCORE: 0

## 2024-01-02 ASSESSMENT — COGNITIVE AND FUNCTIONAL STATUS - GENERAL
MOBILITY SCORE: 24
DAILY ACTIVITIY SCORE: 24
DAILY ACTIVITIY SCORE: 24
MOBILITY SCORE: 24
PATIENT BASELINE BEDBOUND: NO
DAILY ACTIVITIY SCORE: 24
DAILY ACTIVITIY SCORE: 24
MOBILITY SCORE: 24
MOBILITY SCORE: 24

## 2024-01-02 ASSESSMENT — PATIENT HEALTH QUESTIONNAIRE - PHQ9
1. LITTLE INTEREST OR PLEASURE IN DOING THINGS: NOT AT ALL
SUM OF ALL RESPONSES TO PHQ9 QUESTIONS 1 & 2: 0
2. FEELING DOWN, DEPRESSED OR HOPELESS: NOT AT ALL

## 2024-01-02 ASSESSMENT — ACTIVITIES OF DAILY LIVING (ADL)
LACK_OF_TRANSPORTATION: NO
BATHING: INDEPENDENT
WALKS IN HOME: INDEPENDENT
PATIENT'S MEMORY ADEQUATE TO SAFELY COMPLETE DAILY ACTIVITIES?: YES
DRESSING YOURSELF: INDEPENDENT
JUDGMENT_ADEQUATE_SAFELY_COMPLETE_DAILY_ACTIVITIES: YES
ADEQUATE_TO_COMPLETE_ADL: YES
GROOMING: INDEPENDENT
HEARING - LEFT EAR: FUNCTIONAL
LACK_OF_TRANSPORTATION: NO
HEARING - RIGHT EAR: FUNCTIONAL
FEEDING YOURSELF: INDEPENDENT
TOILETING: INDEPENDENT

## 2024-01-02 ASSESSMENT — PAIN SCALES - GENERAL
PAINLEVEL_OUTOF10: 0 - NO PAIN

## 2024-01-02 ASSESSMENT — PAIN - FUNCTIONAL ASSESSMENT
PAIN_FUNCTIONAL_ASSESSMENT: 0-10
PAIN_FUNCTIONAL_ASSESSMENT: 0-10

## 2024-01-02 NOTE — CARE PLAN
The patient's goals for the shift include REMAIN HEMODYNAMICALLY STABLE    The clinical goals for the shift include NO INJURIES DURING THIS SHIFT    FREE FROM SYNCOPE AND COLLAPSE. TELEMETRY MONITOR WITH PULSE OX MONITORING. AWAITING NEUROLOGY CONSULT. IV FLUIDS- NS RUNNING AT 75ML/HR

## 2024-01-02 NOTE — ED PROVIDER NOTES
HPI   Chief Complaint   Patient presents with    Panic Attack     Pt presents via EMS for potential anxiety attack at home; pt was found down on ground by daughter who called EMS; upon arrival to house EMS found pt up walking around, - [facility] en route by squad.        58-year-old female with recent new diagnosis of onset of atrial fibrillation on Eliquis, posterior circulation stroke with no residual deficits presented to emergency room for evaluation of episode where she was found down.  She reports that her grandson was sleeping in the bed with her and was fussy and not wanting out of bed and she was getting up to go find the child's mother indexing she knew she was laying on the ground.  She does not endorse any shortness of breath, chest pain, palpitations prior to the episode.  Otherwise that she been in normal state of health.  Denies any focal numbness, weakness or tingling.  States that she has a chronic deviation of the right eye.  She denies any change in vision or diplopia.  She has any vertigo.  As far she is aware she not hit her head but she is unsure how she got on the ground.  She was able to get up and ambulate normally for EMS with a negative Ravalli scale reportedly within normal prehospital glucose.  She otherwise feels well at this time other than feels significantly anxious.  EMS noted her to be hyperventilating and with a significantly anxious affect.  No medications given prior to arrival.  Patient does endorse prior history of anxiety and panic attacks in the past but is never passed out a result of them.                          Helen Coma Scale Score: 15         NIH Stroke Scale: 0          Patient History   Past Medical History:   Diagnosis Date    Atrial fibrillation (CMS/[facility])     Closed fracture of upper end of humerus 11/03/2015    Dry eyes     DVT (deep venous thrombosis) (CMS/[facility])     tx with Coumadin    Elevated liver enzymes 12/13/2023    Intermittent exotropia of right eye      Migraines     Pre-hypertension 12/13/2023    Scoliosis     Shoulder fracture, left     Stroke (CMS/HCC)     CVA/TIA     Past Surgical History:   Procedure Laterality Date    ECTOPIC PREGNANCY SURGERY      EYE SURGERY Right     x2    FRACTURE SURGERY      left wrist    HERNIA REPAIR      MR HEAD ANGIO WO IV CONTRAST  12/14/2023    MR HEAD ANGIO WO IV CONTRAST 12/14/2023 [facility]    MR NECK ANGIO WO IV CONTRAST  12/14/2023    MR NECK ANGIO WO IV CONTRAST 12/14/2023 [facility]     Family History   Problem Relation Name Age of Onset    Cancer Mother      Heart attack Father          dies at the age of 50's    No Known Problems Sister      No Known Problems Son      Other (borderline diabetic) Maternal Grandfather      Heart attack Paternal Grandmother      Stroke Paternal Grandfather       Social History     Tobacco Use    Smoking status: Every Day     Packs/day: .5     Types: Cigarettes    Smokeless tobacco: Never   Vaping Use    Vaping Use: Not on file   Substance Use Topics    Alcohol use: Not Currently     Comment: 3x per week 2-3 cans of beer    Drug use: Never       Physical Exam   ED Triage Vitals [01/01/24 2346]   Temp Heart Rate Resp BP   36 °C (96.8 °F) 76 20 137/87      SpO2 Temp Source Heart Rate Source Patient Position   99 % Temporal -- Sitting      BP Location FiO2 (%)     Right arm --       Physical Exam  Vitals and nursing note reviewed.   Constitutional:       General: She is not in acute distress.     Appearance: She is well-developed. She is not ill-appearing.   HENT:      Head: Normocephalic and atraumatic.      Comments: No raccoon eyes, Ahuja sign, otorrhea or rhinorrhea.  No hemotympanum on exam.  No mastoid tenderness.  Midface is stable with no septal hematoma no dental trauma.  Overall no sign of trauma to the head or neck.  No midline C-spine, T-spine, L-spine step-off deformity or tenderness.     Nose: No congestion or rhinorrhea.      Mouth/Throat:      Mouth: Mucous membranes are moist.       Pharynx: No oropharyngeal exudate or posterior oropharyngeal erythema.   Eyes:      Conjunctiva/sclera: Conjunctivae normal.      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   Cardiovascular:      Rate and Rhythm: Normal rate and regular rhythm.      Pulses: Normal pulses.      Heart sounds: No murmur heard.     No gallop.   Pulmonary:      Effort: Pulmonary effort is normal. No respiratory distress.      Breath sounds: Normal breath sounds. No stridor. No wheezing, rhonchi or rales.   Abdominal:      General: Bowel sounds are normal. There is no distension.      Palpations: Abdomen is soft.      Tenderness: There is no abdominal tenderness. There is no guarding or rebound.   Musculoskeletal:         General: No swelling.      Cervical back: Neck supple.   Skin:     General: Skin is warm and dry.      Capillary Refill: Capillary refill takes less than 2 seconds.      Findings: No rash.   Neurological:      General: No focal deficit present.      Mental Status: She is alert and oriented to person, place, and time.      Cranial Nerves: No cranial nerve deficit.      Sensory: No sensory deficit.      Gait: Gait normal.      Comments: NIH 0.  Cranial nerves II through XII intact.  Strength 5 out of 5 in all 4 extremities.  Sensation intact to light touch in all 4 extremities.  No dysdiadochokinesia, no dysmetria.  Gait is narrow and stable.   Psychiatric:         Mood and Affect: Mood is anxious.         Speech: Speech normal. Speech is not slurred.         Behavior: Behavior normal.         Thought Content: Thought content normal.         ED Course & [facility]   ED Course as of 01/02/24 0326   Tue Jan 02, 2024   0019 Acute left PCA infarct noted from 1214 MRI. [TL]   0019 No acute neurologic deficit at this time.  Neuro stable gait and normal finger-nose and heel-to-shin. 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  Patient reports that this is chronic for her.  Otherwise no focal deficit neurologically [TL]   0031 Atrial fibrillation with a heart rate of 66 noted and PAC noted.  Right axis deviation identified.  No acute ischemic change identified. [TL]   0126 Patient is intoxicated based on alcohol level of 90.  This may explain anxiety and abnormal affect.  Unclear if alcohol related to episode today. [TL]   0130 Prior subacute infarct noted with no hemorrhagic transformation on the CT imaging of the head. [TL]   0130 Cervical spine is atraumatic. [TL]   0130 Chest Radiograph interpretation: No acute cardiopulmonary process.  No pneumothorax, widened mediastinum, pneumonia. [TL]   0130 CBC reveals increased hemoglobin but otherwise within normal limits.  Urinalysis without signs of infection.  Urine drug screen normal.  Tox panel does reveal elevated ethanol but troponin is within normal limits.  Vital signs reveal no significant change at this time on reassessment. [TL]   0311 On repeat assessment the patient show decision making had and she preferred observation given recent stroke, new onset atrial fibrillation.  Appears to be rate controlled at this time and no emergent pathology identified that requires ED intervention.  However I believe that she is high and at first that would warrant observation and patient is agreeable.  Call placed for medicine admission. [TL]      ED Course User Index  [TL] Ildefonso Martinez DO         Diagnoses as of 01/02/24 0326   Alcoholic intoxication without complication (CMS/HCC)   Syncope and collapse       Medical Decision Making  This is a 58 years old female patient presented to the emergency department with a chief complaint of syncope..  Patient was found on the ground.  Reported having a stroke 2 weeks ago as well as atrial fibrillation.  Currently on Eliquis and very compliant with her treatment.   Denies headache, lightheadedness, dizziness, neck pain, weakness, numbness, change in vision, chest pain, shortness of breath, abdominal pain, or urinary symptoms.      Review of system: As stated above in the HPI section.    Physical exam revealed a 58 years old female patient does not appear to be in acute distress.  Cardiopulmonary as well as abdominal and skin exam are unremarkable.  No midline tenderness to the cervical, thoracic, or lumbosacral region.    Neurologic exam: Alert and oriented x 4, NIH stroke scale of 0.    Given the recent stroke and syncope will admit the patient for syncope workup.ED workup was unremarkable.  Patient is accepted to medicine.        Procedure  Procedures    I saw and evaluated the patient. I personally obtained the key and critical portions of the history and physical exam or was physically present for key and critical portions performed by the resident/fellow. I reviewed the resident/fellow's documentation and discussed the patient with the resident/fellow. I agree with the resident/fellow's medical decision making as documented in the note.        Misha Yates, DO Misha Yates,   01/02/24 0315       Ildefonso Martinez,   01/02/24 0327

## 2024-01-02 NOTE — CARE PLAN
The patient's goals for the shift include REMAIN HEMODYNAMICALLY STABLE    The clinical goals for the shift include NO INJURIES DURING THIS SHIFT    Problem: Fall/Injury  Goal: Not fall by end of shift  Outcome: Progressing

## 2024-01-02 NOTE — H&P
Internal Medicine History and Physical    PATIENT NAME:  Tomasa Gutierres    MRN: [id number]  DATE of SERVICE: January 2, 2024    Primary Care Physician: No primary care provider on file.          Chief Complaint: Syncope    HPI:  This is a 58 y.o. female with Christiano history of atrial fibrillation, TIA, hypertension who presents with Syncopal episode, patient was found on the ground by her daughter, EMS was called, she denies fever or chills.    The emergency room and had CT scan of the brain, C-spine did not show any acute process, medical admitted for further evaluation and treatment    Past Medical History:  Past Medical History:   Diagnosis Date    Atrial fibrillation (CMS/HCC)     Closed fracture of upper end of humerus 11/03/2015    Dry eyes     DVT (deep venous thrombosis) (CMS/HCC)     tx with Coumadin    Elevated liver enzymes 12/13/2023    Intermittent exotropia of right eye     Migraines     Pre-hypertension 12/13/2023    Scoliosis     Shoulder fracture, left     Stroke (CMS/HCC)     CVA/TIA    Stroke (CMS/HCC) 12/2023    left PCA territory infarct       Past Surgical History:  Past Surgical History:   Procedure Laterality Date    ECTOPIC PREGNANCY SURGERY      EYE SURGERY Right     x2    FRACTURE SURGERY      left wrist    HERNIA REPAIR      MR HEAD ANGIO WO IV CONTRAST  12/14/2023    MR HEAD ANGIO WO IV CONTRAST 12/14/2023 [facility]    MR NECK ANGIO WO IV CONTRAST  12/14/2023    MR NECK ANGIO WO IV CONTRAST 12/14/2023 [facility]       Family History:  Family History   Problem Relation Name Age of Onset    Cancer Mother      Heart attack Father          dies at the age of 50's    No Known Problems Sister      No Known Problems Son      Other (borderline diabetic) Maternal Grandfather      Heart attack Paternal Grandmother      Stroke Paternal Grandfather         Social History:    Social History     Socioeconomic History    Marital status: Unknown     Spouse name: Not on file    Number of children: Not on file     Years of education: Not on file    Highest education level: Not on file   Occupational History    Not on file   Tobacco Use    Smoking status: Every Day     Packs/day: .5     Types: Cigarettes    Smokeless tobacco: Never   Vaping Use    Vaping Use: Not on file   Substance and Sexual Activity    Alcohol use: Yes     Comment: 3x per week 2-3 cans of beer    Drug use: Never    Sexual activity: Not on file   Other Topics Concern    Not on file   Social History Narrative    Not on file     Social Determinants of Health     Financial Resource Strain: Low Risk  (1/2/2024)    Overall Financial Resource Strain (CARDIA)     Difficulty of Paying Living Expenses: Not hard at all   Recent Concern: Financial Resource Strain - Medium Risk (12/13/2023)    Overall Financial Resource Strain (CARDIA)     Difficulty of Paying Living Expenses: Somewhat hard   Food Insecurity: Not on file   Transportation Needs: No Transportation Needs (1/2/2024)    PRAPARE - Transportation     Lack of Transportation (Medical): No     Lack of Transportation (Non-Medical): No   Physical Activity: Not on file   Stress: Not on file   Social Connections: Not on file   Intimate Partner Violence: Not on file   Housing Stability: Low Risk  (1/2/2024)    Housing Stability Vital Sign     Unable to Pay for Housing in the Last Year: No     Number of Places Lived in the Last Year: 1     Unstable Housing in the Last Year: No         Prior to Admission Medications:  Medications Prior to Admission   Medication Sig Dispense Refill Last Dose    apixaban (Eliquis) 5 mg tablet Take 1 tablet (5 mg) by mouth every 12 hours. Start in the morning on 12/16/2023 Do not start before December 16, 2023. 60 tablet 2 1/1/2024 at PM    aspirin 81 mg chewable tablet Chew 1 tablet (81 mg) once daily. Do not start before December 16, 2023. 30 tablet 2 1/1/2024 at AM    atorvastatin (Lipitor) 40 mg tablet Take 1 tablet (40 mg) by mouth once daily at bedtime. 90 tablet 2 1/1/2024 at PM        Active orders:  Active Orders   Lab    Basic metabolic panel     Frequency: AM draw     Number of Occurrences: Until Specified    CBC     Frequency: AM draw     Number of Occurrences: Until Specified    Magnesium     Frequency: AM draw     Number of Occurrences: Until Specified   Diet    Adult diet Cardiac; 70 gm fat; 2 - 3 grams Sodium     Frequency: Effective now     Number of Occurrences: Until Specified   Nursing    Activity (specify) Out of Bed with Assistance     Frequency: Until discontinued     Number of Occurrences: Until Specified    Cardiac Monitoring - ED/ICU/PACU Only     Frequency: Until discontinued     Number of Occurrences: Until Specified    Check pulse oximetry     Frequency: q4h     Number of Occurrences: Until Specified    Notify provider     Frequency: Until discontinued     Number of Occurrences: Until Specified    Orthostatic blood pressure     Frequency: Once     Number of Occurrences: 1 Occurrences    Pain Assessment     Frequency: q4h     Number of Occurrences: Until Specified    Place sequential compression device     Frequency: Until discontinued     Number of Occurrences: Until Specified    Telemetry monitoring     Frequency: Until discontinued     Number of Occurrences: 3 Days    Vital Signs     Frequency: q1hr     Number of Occurrences: Until Specified    Vital Signs     Frequency: q4h     Number of Occurrences: Until Specified    Weight on admission     Frequency: Daily     Number of Occurrences: Until Specified   Code Status    Full code     Frequency: Continuous     Number of Occurrences: Until Specified   Consult    Inpatient consult to Neurology     Frequency: Once     Number of Occurrences: 1 Occurrences   Respiratory Care    Pulse oximetry, continuous     Frequency: Continuous     Number of Occurrences: Until Specified   ECG    ECG 12 lead     Frequency: q1h     Number of Occurrences: 2 Occurrences    ECG 12 lead     Frequency: PRN     Number of Occurrences: Until  "Specified     Order Comments: Repeat ECG every 15-30 min for one hour with non-diagnostic ECG and ongoing symp.      Electrocardiogram, 12-lead PRN ACS symptoms     Frequency: PRN     Number of Occurrences: Until Specified     Order Comments: Notify provider if performed.     Precaution    Aspiration precautions     Frequency: Until discontinued     Number of Occurrences: Until Specified    Fall precautions     Frequency: Until discontinued     Number of Occurrences: Until Specified   Medications    acetaminophen (Tylenol) tablet 650 mg     Frequency: q6h PRN     Dose: 650 mg     Route: oral    melatonin tablet 3 mg     Frequency: Daily PRN     Dose: 3 mg     Route: oral    nicotine (Nicoderm CQ) 21 mg/24 hr patch 1 patch     Frequency: Daily     Dose: 1 patch     Route: transdermal    polyethylene glycol (Glycolax, Miralax) packet 17 g     Frequency: Daily PRN     Dose: 17 g     Route: oral    sodium chloride 0.9% infusion     Frequency: Continuous     Dose: 75 mL/hr     Route: intravenous           Allergies:   Allergies   Allergen Reactions    Azithromycin Hives and Swelling    Orange Headache    Orange Juice Headache       Review of Systems:  A 10 systems review of systems is negative except for HPI.      Vitals:  Patient Vitals for the past 24 hrs:   BP Temp Temp src Pulse Resp SpO2 Height Weight   01/02/24 0800 132/78 37 °C (98.6 °F) Temporal 59 18 99 % -- 74.8 kg (164 lb 14.5 oz)   01/02/24 0407 123/58 -- -- 58 -- -- -- --   01/02/24 0406 139/71 -- -- 63 -- 97 % -- --   01/02/24 0405 147/85 36 °C (96.8 °F) -- 85 18 97 % 1.753 m (5' 9\") 73.1 kg (161 lb 2.5 oz)   01/02/24 0356 122/71 36.8 °C (98.2 °F) Temporal 70 18 97 % -- --   01/02/24 0230 135/75 -- -- 83 18 99 % -- --   01/01/24 2346 137/87 36 °C (96.8 °F) Temporal 76 20 99 % 1.753 m (5' 9\") 72.6 kg (160 lb)     Body mass index is 24.35 kg/m².         Physical Exam:  General appearance: Well-appearing alert, in no acute distress and well-hydrated, well " nourished  Skin: Skin color, texture, turgor normal  Head: normal  Eyes: Anicteric sclera. Pupils are equally round and reactive to light.  Extraocular movements are intact.   Ears: external ears normal, canals clear, TM's normal  Nose/Sinuses: Negative  Oropharynx: Lips, mucosa, and tongue normal, teeth and gums normal, oropharynx normal  Neck: Supple, no adenopathy; thyroid symmetric, normal size, no bruits  Lungs: Clear to auscultation, no wheezing or rhonchi  Heart: Irregularly irregular  Abdomen: Soft, non-tender. Bowel sounds normal. No masses, organomegaly  Extremities: No deformity, no edema  Peripheral pulses: Normal  Neuro: Alert oriented x3, no focal deficit.      Labs:  Results for orders placed or performed during the hospital encounter of 01/01/24 (from the past 24 hour(s))   Urinalysis with Reflex Culture and Microscopic   Result Value Ref Range    Color, Urine Straw Straw, Yellow    Appearance, Urine Hazy (N) Clear    Specific Gravity, Urine 1.003 (N) 1.005 - 1.035    pH, Urine 7.0 5.0, 5.5, 6.0, 6.5, 7.0, 7.5, 8.0    Protein, Urine NEGATIVE NEGATIVE mg/dL    Glucose, Urine NEGATIVE NEGATIVE mg/dL    Blood, Urine NEGATIVE NEGATIVE    Ketones, Urine NEGATIVE NEGATIVE mg/dL    Bilirubin, Urine NEGATIVE NEGATIVE    Urobilinogen, Urine <2.0 <2.0 mg/dL    Nitrite, Urine NEGATIVE NEGATIVE    Leukocyte Esterase, Urine NEGATIVE NEGATIVE   CBC and Auto Differential   Result Value Ref Range    WBC 6.5 4.4 - 11.3 x10*3/uL    nRBC 0.0 0.0 - 0.0 /100 WBCs    RBC 5.14 4.00 - 5.20 x10*6/uL    Hemoglobin 16.2 (H) 12.0 - 16.0 g/dL    Hematocrit 48.6 (H) 36.0 - 46.0 %    MCV 95 80 - 100 fL    MCH 31.5 26.0 - 34.0 pg    MCHC 33.3 32.0 - 36.0 g/dL    RDW 11.9 11.5 - 14.5 %    Platelets 250 150 - 450 x10*3/uL    Neutrophils % 53.2 40.0 - 80.0 %    Immature Granulocytes %, Automated 0.3 0.0 - 0.9 %    Lymphocytes % 37.6 13.0 - 44.0 %    Monocytes % 6.5 2.0 - 10.0 %    Eosinophils % 1.8 0.0 - 6.0 %    Basophils % 0.6 0.0 -  2.0 %    Neutrophils Absolute 3.45 1.20 - 7.70 x10*3/uL    Immature Granulocytes Absolute, Automated 0.02 0.00 - 0.70 x10*3/uL    Lymphocytes Absolute 2.44 1.20 - 4.80 x10*3/uL    Monocytes Absolute 0.42 0.10 - 1.00 x10*3/uL    Eosinophils Absolute 0.12 0.00 - 0.70 x10*3/uL    Basophils Absolute 0.04 0.00 - 0.10 x10*3/uL   Comprehensive Metabolic Panel   Result Value Ref Range    Glucose 91 74 - 99 mg/dL    Sodium 143 136 - 145 mmol/L    Potassium 3.9 3.5 - 5.3 mmol/L    Chloride 108 (H) 98 - 107 mmol/L    Bicarbonate 24 21 - 32 mmol/L    Anion Gap 15 10 - 20 mmol/L    Urea Nitrogen 6 6 - 23 mg/dL    Creatinine 0.52 0.50 - 1.05 mg/dL    eGFR >90 >60 mL/min/1.73m*2    Calcium 9.3 8.6 - 10.3 mg/dL    Albumin 4.0 3.4 - 5.0 g/dL    Alkaline Phosphatase 86 33 - 136 U/L    Total Protein 7.0 6.4 - 8.2 g/dL    AST 24 9 - 39 U/L    Bilirubin, Total 0.6 0.0 - 1.2 mg/dL    ALT 19 7 - 45 U/L   Magnesium   Result Value Ref Range    Magnesium 1.84 1.60 - 2.40 mg/dL   Acute Toxicology Panel, Blood   Result Value Ref Range    Acetaminophen <10.0 10.0 - 30.0 ug/mL    Salicylate  <3 4 - 20 mg/dL    Alcohol 90 (H) <=10 mg/dL   Troponin I, High Sensitivity, Initial   Result Value Ref Range    Troponin I, High Sensitivity 5 0 - 13 ng/L   Drug Screen, Urine   Result Value Ref Range    Amphetamine Screen, Urine Presumptive Negative Presumptive Negative    Barbiturate Screen, Urine Presumptive Negative Presumptive Negative    Benzodiazepines Screen, Urine Presumptive Negative Presumptive Negative    Cannabinoid Screen, Urine Presumptive Negative Presumptive Negative    Cocaine Metabolite Screen, Urine Presumptive Negative Presumptive Negative    Fentanyl Screen, Urine Presumptive Negative Presumptive Negative    Opiate Screen, Urine Presumptive Negative Presumptive Negative    Oxycodone Screen, Urine Presumptive Negative Presumptive Negative    PCP Screen, Urine Presumptive Negative Presumptive Negative   Troponin, High Sensitivity, 1 Hour  "  Result Value Ref Range    Troponin I, High Sensitivity 5 0 - 13 ng/L   Phosphorus   Result Value Ref Range    Phosphorus 3.2 2.5 - 4.9 mg/dL         Imaging:   Reviewed          Assessment/Plan:  Medical Problems       Problem List       * (Principal) Syncope and collapse    New onset a-fib (CMS/HCC)    Elevated brain natriuretic peptide (BNP) level    Hyponatremia    Closed fracture of upper end of humerus    Elevated liver enzymes    Pre-hypertension    Headache    Visual disturbance    Nicotine dependence    Mass of submandibular region    Excessive involuntary blinking    Atrial fibrillation (CMS/HCC)    Cerebrovascular accident (CVA) due to occlusion of left posterior cerebral artery (CMS/HCC)    Alcohol use    History of left PCA stroke        Admit patient to the medical floor telemetry  Consult neurology and cardiology  Continue statin and antiplatelet therapy for recent stroke  PT/OT  Brain MRI        DVT Prophylaxis- Addressed    Discussed with patient, RN    SIGNATURE: Chilo Matthew MD  DATE: January 2, 2024  TIME: 8:49 AM      This note was partially created using voice recognition software and is inherently subject to errors including those of syntax and \"sound-alike\" substitutions which may escape proofreading. In such instances, original meaning may be extrapolated by contextual derivation    "

## 2024-01-02 NOTE — H&P
"History Of Present Illness  Tomasa Gutierres is a 58 y.o. female presenting to [facility] on 1/1/2024 with pertinent medical history of atrial fibs on Eliquis, TIA, left PCA territory infarct on 12/14/2023, and pre-HTN who presents with unwitnessed syncope and collapse.  Patient's daughter called EMS as she found her on the ground.  Upon EMS arrival the patient was up walking around.  \"She reports that her grandson was sleeping in the bed with her and was fussy and not wanting out of the bed and she was getting up to go find the child's mother\", however was unaware of how she got on the ground.  EMS did report that she was hyperventilating upon their arrival.  Patient reports that she has been taking her medication as prescribed since discharge.  Denies any recent fever/chills, headache, dizziness, chest pain / palpitations, shortness of breath, cough, abdominal pain, N/V/D/C, dysuria, or hematuria.    In the ED, initial VS Temp 36.0, HR 76, RR 20, /87, SpO2 99% on room air.  CBC and CMP unremarkable.  High-sensitivity troponin 1 negative at 5> 5.  Magnesium 1.84.  UA and urine tox negative.  Acetaminophen and salicylate WNL.  Alcohol level 90.  Chest x-ray negative.  CT head and C-spine: No acute fracture.  Redemonstration of evolving left PCA territory infarct which is now subacute.  No evidence of acute intracranial hemorrhage.  While in the ED patient received Ativan 1 mg p.o. x 1. Patient being admitted for observation with syncope and collapse with a consult to neurology.    Past Medical History  She has a past medical history of Atrial fibrillation (CMS/Formerly [facility]), Closed fracture of upper end of humerus (11/03/2015), Dry eyes, DVT (deep venous thrombosis) (CMS/Formerly [facility]), Elevated liver enzymes (12/13/2023), Intermittent exotropia of right eye, Migraines, Pre-hypertension (12/13/2023), Scoliosis, Shoulder fracture, left, Stroke (CMS/Formerly [facility]), and Stroke (CMS/Formerly [facility]) (12/2023).    Surgical History  She has a past surgical history " that includes Fracture surgery; Eye surgery (Right); Ectopic pregnancy surgery; Hernia repair; MR angio head wo IV contrast (12/14/2023); and MR angio neck wo IV contrast (12/14/2023).     Social History  She reports that she has been smoking cigarettes. She has been smoking an average of .5 packs per day. She has never used smokeless tobacco. She reports current alcohol use. She reports that she does not use drugs.    Family History  Family History   Problem Relation Name Age of Onset    Cancer Mother      Heart attack Father          dies at the age of 50's    No Known Problems Sister      No Known Problems Son      Other (borderline diabetic) Maternal Grandfather      Heart attack Paternal Grandmother      Stroke Paternal Grandfather          Allergies  Azithromycin, Orange, and Orange juice    Review of Systems (Bold words are positive)    Constitutional: NEGATIVE: Fever, Chills, Malaise, Weight Loss, Fatigue     Eyes: NEGATIVE: Blurry Vision, Vision Loss/ Change, Redness, Drainage     ENMT: NEGATIVE: Nasal Discharge, Nasal Congestion, Throat Pain, Mouth Pain, Ear Pain     Respiratory: NEGATIVE: Dry Cough, Productive Cough, Shortness of Breath, Wheezing, Hemoptysis     Cardiac: NEGATIVE: Chest Pain, Dyspnea on Exertion, Palpitations, Orthopnea, Syncope      Gastrointestinal: Negative: Nausea, Vomiting, Diarrhea, Constipation, Abdominal Pain     Genitourinary: NEGATIVE: Dysuria, Frequency, Hematuria, Flank Pain     Musculoskeletal: Negative: Decreased ROM, Pain, Weakness, Swelling     Neurological: NEGATIVE: Confusion, Dizziness, Headache, Syncope, Seizures     Psychiatric: NEGATIVE: Anxiety, Depression     Skin: NEGATIVE: Mass, Rash, Pruritus,  Ulcer, Pain     Endocrine: NEGATIVE: Sweat, Thirst, Polydipsia      Hematologic/Lymph: NEGATIVE: Anemia, Bruising, Night Sweats     Allergic/Immunologic: NEGATIVE: Itching, Sneezing        Physical Exam  Constitutional: Awake and alert, Calm, and Cooperative. Speech  "clear. No acute distress.  Skin: Pink, warm, and dry. No lesions or rashes.  Eyes:  sclera clear, No swelling or drainage noted, right wandering eye  ENMT: Mucous membranes pink and moist, no apparent injury, no lesions seen  Head/Neck: Neck Supple, no apparent injury, trachea midline, no palpable masses on head  Cardiac: irregular rhythm and rate, Auscultated S1 & S2, no murmurs  Lungs: CTAB, symmetrical chest rise, no accessory muscle usage, unlabored  Abdomen: Soft, non-tender, no rebound tenderness or guarding, nondistended, positive bowel sounds in all quadrants  Musculoskeletal: ROM intact, no joint swelling, normal strength  Extremities: No edema, No cyanosis, 1-2+ pulses of the extremities, see skin assessment for wounds  Neurological: Alert and Oriented x 3, intact senses, bilateral hand grasps and foot push/pulls equal.  Psychological: Appropriate mood and behavior.       Last Recorded Vitals  Blood pressure 135/75, pulse 83, temperature 36 °C (96.8 °F), temperature source Temporal, resp. rate 18, height 1.753 m (5' 9\"), weight 72.6 kg (160 lb), SpO2 99 %.  Visit Vitals  /75   Pulse 83   Temp 36 °C (96.8 °F) (Temporal)   Resp 18   Ht 1.753 m (5' 9\")   Wt 72.6 kg (160 lb)   SpO2 99%   BMI 23.63 kg/m²   OB Status Postmenopausal   Smoking Status Every Day   BSA 1.88 m²     Weight: 72.6 kg (160 lb)   Pain Assessment: 0-10  Pain Score: 0 - No pain        Relevant Results  Results for orders placed or performed during the hospital encounter of 01/01/24 (from the past 24 hour(s))   Urinalysis with Reflex Culture and Microscopic   Result Value Ref Range    Color, Urine Straw Straw, Yellow    Appearance, Urine Hazy (N) Clear    Specific Gravity, Urine 1.003 (N) 1.005 - 1.035    pH, Urine 7.0 5.0, 5.5, 6.0, 6.5, 7.0, 7.5, 8.0    Protein, Urine NEGATIVE NEGATIVE mg/dL    Glucose, Urine NEGATIVE NEGATIVE mg/dL    Blood, Urine NEGATIVE NEGATIVE    Ketones, Urine NEGATIVE NEGATIVE mg/dL    Bilirubin, Urine " NEGATIVE NEGATIVE    Urobilinogen, Urine <2.0 <2.0 mg/dL    Nitrite, Urine NEGATIVE NEGATIVE    Leukocyte Esterase, Urine NEGATIVE NEGATIVE   CBC and Auto Differential   Result Value Ref Range    WBC 6.5 4.4 - 11.3 x10*3/uL    nRBC 0.0 0.0 - 0.0 /100 WBCs    RBC 5.14 4.00 - 5.20 x10*6/uL    Hemoglobin 16.2 (H) 12.0 - 16.0 g/dL    Hematocrit 48.6 (H) 36.0 - 46.0 %    MCV 95 80 - 100 fL    MCH 31.5 26.0 - 34.0 pg    MCHC 33.3 32.0 - 36.0 g/dL    RDW 11.9 11.5 - 14.5 %    Platelets 250 150 - 450 x10*3/uL    Neutrophils % 53.2 40.0 - 80.0 %    Immature Granulocytes %, Automated 0.3 0.0 - 0.9 %    Lymphocytes % 37.6 13.0 - 44.0 %    Monocytes % 6.5 2.0 - 10.0 %    Eosinophils % 1.8 0.0 - 6.0 %    Basophils % 0.6 0.0 - 2.0 %    Neutrophils Absolute 3.45 1.20 - 7.70 x10*3/uL    Immature Granulocytes Absolute, Automated 0.02 0.00 - 0.70 x10*3/uL    Lymphocytes Absolute 2.44 1.20 - 4.80 x10*3/uL    Monocytes Absolute 0.42 0.10 - 1.00 x10*3/uL    Eosinophils Absolute 0.12 0.00 - 0.70 x10*3/uL    Basophils Absolute 0.04 0.00 - 0.10 x10*3/uL   Comprehensive Metabolic Panel   Result Value Ref Range    Glucose 91 74 - 99 mg/dL    Sodium 143 136 - 145 mmol/L    Potassium 3.9 3.5 - 5.3 mmol/L    Chloride 108 (H) 98 - 107 mmol/L    Bicarbonate 24 21 - 32 mmol/L    Anion Gap 15 10 - 20 mmol/L    Urea Nitrogen 6 6 - 23 mg/dL    Creatinine 0.52 0.50 - 1.05 mg/dL    eGFR >90 >60 mL/min/1.73m*2    Calcium 9.3 8.6 - 10.3 mg/dL    Albumin 4.0 3.4 - 5.0 g/dL    Alkaline Phosphatase 86 33 - 136 U/L    Total Protein 7.0 6.4 - 8.2 g/dL    AST 24 9 - 39 U/L    Bilirubin, Total 0.6 0.0 - 1.2 mg/dL    ALT 19 7 - 45 U/L   Magnesium   Result Value Ref Range    Magnesium 1.84 1.60 - 2.40 mg/dL   Acute Toxicology Panel, Blood   Result Value Ref Range    Acetaminophen <10.0 10.0 - 30.0 ug/mL    Salicylate  <3 4 - 20 mg/dL    Alcohol 90 (H) <=10 mg/dL   Troponin I, High Sensitivity, Initial   Result Value Ref Range    Troponin I, High Sensitivity 5 0 -  13 ng/L   Drug Screen, Urine   Result Value Ref Range    Amphetamine Screen, Urine Presumptive Negative Presumptive Negative    Barbiturate Screen, Urine Presumptive Negative Presumptive Negative    Benzodiazepines Screen, Urine Presumptive Negative Presumptive Negative    Cannabinoid Screen, Urine Presumptive Negative Presumptive Negative    Cocaine Metabolite Screen, Urine Presumptive Negative Presumptive Negative    Fentanyl Screen, Urine Presumptive Negative Presumptive Negative    Opiate Screen, Urine Presumptive Negative Presumptive Negative    Oxycodone Screen, Urine Presumptive Negative Presumptive Negative    PCP Screen, Urine Presumptive Negative Presumptive Negative   Troponin, High Sensitivity, 1 Hour   Result Value Ref Range    Troponin I, High Sensitivity 5 0 - 13 ng/L      CT head wo IV contrast    Result Date: 1/2/2024  Interpreted By:  Jarrod Dominguez, STUDY: CT HEAD WO IV CONTRAST; CT CERVICAL SPINE WO IV CONTRAST;  1/2/2024 12:42 am   INDICATION: Signs/Symptoms:found down/sycnope on eliquis. Altered mental status   COMPARISON: Head CT dated 12/14/2022..   ACCESSION NUMBER(S): [id number]; [id number]   ORDERING CLINICIAN: CHEKO LOVE   TECHNIQUE: Axial noncontrast CT images of the head and cervical spine.   FINDINGS: BRAIN PARENCHYMA: There is redemonstration of now subacute infarct in the left PCA territory in the occipital and posteromedial temporal lobe. There is also unchanged small chronic infarct in the right parietal lobe. Scattered bilateral deep white matter hypodensities suggestive of chronic microvascular ischemic change. No mass, mass effect or midline shift.   HEMORRHAGE: No acute intracranial hemorrhage. VENTRICLES and EXTRA-AXIAL SPACES: Normal size for age. EXTRACRANIAL SOFT TISSUES: Unremarkable. CALVARIUM: No depressed calvarial fracture.   SOFT TISSUES: Within normal limits. PARANASAL SINUSES: No hemorrhage in the paranasal sinuses. MASTOIDS: Within normal limits. ORBITS:  Grossly normal.   ALIGNMENT: Normal cervical lordosis. VERTEBRAE: No acute fracture. Vertebral body heights are maintained. There are no suspicious osseous lesions. There is mild degenerative disc space narrowing and posterolateral osseous spurring at C5-C6 and C6-C7. There is mild to moderate right foraminal stenosis at C5-C6 due to posterolateral osseous spurring. SPINAL CANAL: There are small central disc protrusions at C5-C6 and C6-C7. No critical spinal canal stenosis. PREVERTEBRAL SOFT TISSUES: No prevertebral soft tissue swelling. LUNG APICES: Imaged portion of the lung apices are within normal limits.   OTHER FINDINGS: There is a 1.9 cm low-attenuation subcutaneous nodule in the right anterolateral neck below the mandibular angle, which may represent a sebaceous cyst. Correlation with direct inspection is recommended.         No evidence of acute cortical infarct or acute intracranial hemorrhage. Redemonstration of evolving left PCA territory infarct which is now subacute. Chronic small right parietal infarct, unchanged.   No evidence of cervical spine fracture or acute traumatic malalignment.   Signed by: Jarrod Dominguez 1/2/2024 12:57 AM Dictation workstation:   [id number]    CT cervical spine wo IV contrast    Result Date: 1/2/2024  Interpreted By:  Jarrod Dominguez, STUDY: CT HEAD WO IV CONTRAST; CT CERVICAL SPINE WO IV CONTRAST;  1/2/2024 12:42 am   INDICATION: Signs/Symptoms:found down/sycnope on eliquis. Altered mental status   COMPARISON: Head CT dated 12/14/2022..   ACCESSION NUMBER(S): [id number]; [id number]   ORDERING CLINICIAN: CHEKO LOVE   TECHNIQUE: Axial noncontrast CT images of the head and cervical spine.   FINDINGS: BRAIN PARENCHYMA: There is redemonstration of now subacute infarct in the left PCA territory in the occipital and posteromedial temporal lobe. There is also unchanged small chronic infarct in the right parietal lobe. Scattered bilateral deep white matter hypodensities  suggestive of chronic microvascular ischemic change. No mass, mass effect or midline shift.   HEMORRHAGE: No acute intracranial hemorrhage. VENTRICLES and EXTRA-AXIAL SPACES: Normal size for age. EXTRACRANIAL SOFT TISSUES: Unremarkable. CALVARIUM: No depressed calvarial fracture.   SOFT TISSUES: Within normal limits. PARANASAL SINUSES: No hemorrhage in the paranasal sinuses. MASTOIDS: Within normal limits. ORBITS: Grossly normal.   ALIGNMENT: Normal cervical lordosis. VERTEBRAE: No acute fracture. Vertebral body heights are maintained. There are no suspicious osseous lesions. There is mild degenerative disc space narrowing and posterolateral osseous spurring at C5-C6 and C6-C7. There is mild to moderate right foraminal stenosis at C5-C6 due to posterolateral osseous spurring. SPINAL CANAL: There are small central disc protrusions at C5-C6 and C6-C7. No critical spinal canal stenosis. PREVERTEBRAL SOFT TISSUES: No prevertebral soft tissue swelling. LUNG APICES: Imaged portion of the lung apices are within normal limits.   OTHER FINDINGS: There is a 1.9 cm low-attenuation subcutaneous nodule in the right anterolateral neck below the mandibular angle, which may represent a sebaceous cyst. Correlation with direct inspection is recommended.         No evidence of acute cortical infarct or acute intracranial hemorrhage. Redemonstration of evolving left PCA territory infarct which is now subacute. Chronic small right parietal infarct, unchanged.   No evidence of cervical spine fracture or acute traumatic malalignment.   Signed by: Jarrod Dominguez 1/2/2024 12:57 AM Dictation workstation:   [id number]    XR chest 1 view    Result Date: 1/2/2024  Interpreted By:  Jarrod Dominguez, STUDY: XR CHEST 1 VIEW;  1/2/2024 12:35 am   INDICATION: Signs/Symptoms:syncope.   COMPARISON: Chest radiograph dated 12/13/2023.   ACCESSION NUMBER(S): [id number]   ORDERING CLINICIAN: CHEKO LOVE   FINDINGS:   CARDIOMEDIASTINAL SILHOUETTE:  Cardiomediastinal silhouette is normal in size and configuration taking into account portable technique and patient rotation.   LUNGS/PLEURA: There are no consolidations.There are no pleural effusions. There is no demonstrated pneumothorax.     BONES: No evidence of acute osseous abnormality.       1.  No evidence of acute cardiopulmonary process.     Signed by: Jarrod Dominguez 1/2/2024 12:47 AM Dictation workstation:   [id number]     EKG at 0015: A-fib with PVC, ventricular rate 66, DE *, QRS 90, QTc 457. No ST elevation or depression noted.     EKG at 139: A-fib, ventricular rate 75, DE *, QRS 88, QTc 482. No ST elevation or depression noted.       Home Medications  Prior to Admission medications    Medication Sig Start Date End Date Taking? Authorizing Provider   apixaban (Eliquis) 5 mg tablet Take 1 tablet (5 mg) by mouth every 12 hours. Start in the morning on 12/16/2023 Do not start before December 16, 2023. 12/16/23 3/15/24  YOLANDA Dunlap   aspirin 81 mg chewable tablet Chew 1 tablet (81 mg) once daily. Do not start before December 16, 2023. 12/16/23   YOLANDA Dunlap   atorvastatin (Lipitor) 40 mg tablet Take 1 tablet (40 mg) by mouth once daily at bedtime. 12/15/23   YOLANDA Dunlap       Medications  Scheduled medications  magnesium oxide, 400 mg, oral, Once  nicotine, 1 patch, transdermal, Daily      Continuous medications  sodium chloride 0.9%, 75 mL/hr      PRN medications  PRN medications: acetaminophen, melatonin, polyethylene glycol         Assessment/Plan   Principal Problem:    Syncope and collapse  Active Problems:    Pre-hypertension    Atrial fibrillation (CMS/HCC)    Alcohol use    History of left PCA stroke      Plan:  Code Status: Full Code   Consult: Neurology.  IVFs: Normal saline at 75 MLS per hour x 12 hours.  Meds: Nicotine transdermal patch 21 mg per 24 hours, Tylenol 650 mg p.o. every 6 hours as needed for headache or pain 1-6, melatonin 3 milligrams  p.o. daily as needed for insomnia, MiraLAX 17 gms p.o daily as needed for constipation.  Avoid medication that will prolong the QT.  Diet: Cardiac.  Telemetry monitoring.  Vital signs with BP, HR, & RR Q 4 hours.  Pulse ox Q 4 hours.   Admission height and weight.  Labs ordered: CBC, BMP, Mg, Phos.  Test ordered: Defer to attending.  Monitor / trend labs while inpatient.  Replace electrolytes as needed.  Aspiration precautions.  Out of bed with assistance   Fall precautions  Encourage & educate on smoking cessation.  Encourage & educate on ETOH cessation.  Call physician for temperature < 36.5 or >38.0 degrees celsius.  Call physician for pulse <50 or >120.  Call physician for respiratory rate <10 or >22.  Call physician for systolic blood pressure <90 or >170.  Call physician for diastolic blood pressure < 50 or >100.  Call physician for pulse ox <92%.  See orders for further plan of care ordered.     VTE prophylaxis:   BLE SCDs.  Monitor for s/s of bleeding    Medication reconciliation to be completed when nursing/pharmacy  are able to verify patient's accurate home medications.    Further evaluation and management per attending and consulting physicians.      This note has been transcribed using Dragon voice recognition system and there is a possibility of unintentional typing misprints.  Any information found to be copied from previous providers is done in the best interest of the patient to provide accurate, quality, and continuity of care.     I spent 42 minutes in the professional and overall care of this patient.      Gege Keating, [facility]. [place]

## 2024-01-02 NOTE — DISCHARGE INSTRUCTIONS
#1 atrial fibrillation of uncertain duration with the patient having had a posterior circulation stroke approximately 3 weeks ago.  There is an element of sick sinus syndrome.  However the patient has not yet met criteria for pacemaker.  There is some tacky, bradycardia.  Would recommend an outpatient monitor and follow-up in the office.  Once she is 8 weeks out from her stroke, will consider a GOOD with cardioversion procedure.  In the meantime continue anticoagulation.     Have recommended the patient that she discontinue alcohol altogether and continue on her Eliquis therapy.     She also has somewhat fitful sleep which could be related alcohol or possibly underlying sleep apnea.  Recommend outpatient sleep study.     2.  Her syncopal episode likely related to alcohol, sleep disorder.  She herself reports that she did not feel that she was completely syncopal.       No evidence of significant underlying structural heart disease or coronary disease.

## 2024-01-02 NOTE — PROGRESS NOTES
Met with the patient in the room, she states that she lives at home with her son, girlfriend and grandson. She states that she is independent in her daily activities. She has no current homegoing concerns.

## 2024-01-02 NOTE — CONSULTS
Consults    58-year-old lady  History obtained from the patient.  Phone call placed and left message for call back with Zoe the person who found her on the floor-[phone]    Also discussed with the ER physician.    She said her grandson woke her up.  She was sleeping in the bedroom with her grandson.  She really did not remember details of what happened.  She found herself on the floor.  She told her family not to call EMS but anyway her friend called EMS.  By the time EMS came the patient was and ambulatory.  No tongue bite or loss of bladder or bowel control.  No headache.    She has chronic diplopia.    Several years ago she had an episode of loss of consciousness.  She had not slept the previous night.  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  There was concern about possible seizure at that time.  She had workup and apparently some abnormality was found on presumably brainwave testing.  Never placed on anticonvulsant medications.  Never had any symptoms like this after that.    She said she drank only a little bit of alcohol.  Her orthostatic blood pressure readings showed significant drop in pressure on assuming the erect posture.    Other relevant history-she drinks alcohol.  Continues to smoke cigarettes.  No substance abuse.  History of atrial fibrillation.  She is on anticoagulants and aspirin.  Also takes atorvastatin.  Mid December 2023 she had a stroke-left occipital temporal region.  MRI/MRA brain from then-I independently reviewed and interpreted the images-PCA stenosis and infarct but no bleed.  This time CT brain-I independently reviewed and interpreted the images-remote infarct/evolving infarct left PCA territory.  Perhaps remote infarct right parietal region.  No bleed.    I reviewed labs  Sodium was 135 and now it is 143.  Potassium was okay.  Calcium okay.  Hepatic  enzymes okay.  LDL 93 magnesium 1.8 WBC normal.  Hemoglobin 16.2 with a hematocrit of 48.6, platelet count 250,000.  Alcohol level-90      Examination:    Neurologic exam:  Mental status : speech, language, attention, concentration, orientation to time/Place/person/situation, fund of knowledge were normal.  Cranial nerves: cranial nerves were examined.  Pupils were equal and reactive to light.  External ocular movements were abnormal with decreased adduction of the right eye with some nystagmus during this eye movement on the right side.  She had double vision on looking slightly to the left.-This is chronic visual fields were normal.  Face was symmetric.  Tongue was in the midline.  Soft palate move normally.  Facial sensations were normal.  Hearing was normal.  Neck : supple.  Gait and Station : Not evaluated  Strength : normal.  Muscle tone : normal.  Abnormal involuntary movements : none.  Atrophy/fasciculations : none.  Muscle stretch reflexes : normal.  Pathologic reflexes : absent.  Sensory : normal.  Coordination : normal.  Cardiovascular : normal first and second heart sounds.  No murmur.  No cranial or cervical bruits.    Chest : chest expansion was normal.  Breath sounds normal.  No adventitious sounds.    Impression discussion plan    Loss of awareness/consciousness-found on the floor    Possibilities include-inebriation with alcohol, toxic encephalopathy, syncope, closed head injury, seizure.  Recent stroke in December 2023-as mentioned above.  Recurrent stroke is a possibility despite being on anticoagulation.  No evidence of intracranial bleeding.  Possible convulsive syncope versus syncope versus seizure several years ago-see description and details above.  Possible EEG abnormality per patient report from many years ago.    Recurrent strokes probably secondary to atrial fibrillation related cardioembolic events.  Toxic encephalopathy, previous strokes can cause seizures.      Check EEG-  Check MRI  brain no contrast.    Watch for alcohol related encephalopathy, withdrawal encephalopathy, seizure.  Rx vitamin B1.  Physical therapy evaluation  Monitor blood pressure  Monitor cardiac rhythm.  She has known atrial fibrillation.  Continue anticoagulation  She is on statins-continue statins.  She is also on aspirin.      Advised her to quit drinking alcohol.  Advised her to quit smoking.    I reviewed labs  I reviewed medications  I independently reviewed and interpreted relevant imaging studies  The patient's neurologic problem poses a threat to brain and/or bodily function  There is acute worsening of a chronic problem  I obtained information from and discussed with an independent historian  I reviewed relevant records  I discussed management with healthcare professionals involved in this patient's care

## 2024-01-03 VITALS
BODY MASS INDEX: 24.48 KG/M2 | SYSTOLIC BLOOD PRESSURE: 116 MMHG | HEART RATE: 78 BPM | OXYGEN SATURATION: 98 % | DIASTOLIC BLOOD PRESSURE: 75 MMHG | TEMPERATURE: 96.3 F | RESPIRATION RATE: 18 BRPM | HEIGHT: 69 IN | WEIGHT: 165.3 LBS

## 2024-01-03 LAB
ANION GAP SERPL CALC-SCNC: 12 MMOL/L (ref 10–20)
ATRIAL RATE: 220 BPM
ATRIAL RATE: 250 BPM
BUN SERPL-MCNC: 8 MG/DL (ref 6–23)
CALCIUM SERPL-MCNC: 9 MG/DL (ref 8.6–10.3)
CHLORIDE SERPL-SCNC: 106 MMOL/L (ref 98–107)
CO2 SERPL-SCNC: 25 MMOL/L (ref 21–32)
CREAT SERPL-MCNC: 0.56 MG/DL (ref 0.5–1.05)
ERYTHROCYTE [DISTWIDTH] IN BLOOD BY AUTOMATED COUNT: 12.1 % (ref 11.5–14.5)
GFR SERPL CREATININE-BSD FRML MDRD: >90 ML/MIN/1.73M*2
GLUCOSE SERPL-MCNC: 80 MG/DL (ref 74–99)
HCT VFR BLD AUTO: 45.8 % (ref 36–46)
HGB BLD-MCNC: 15.5 G/DL (ref 12–16)
MAGNESIUM SERPL-MCNC: 1.95 MG/DL (ref 1.6–2.4)
MCH RBC QN AUTO: 32.2 PG (ref 26–34)
MCHC RBC AUTO-ENTMCNC: 33.8 G/DL (ref 32–36)
MCV RBC AUTO: 95 FL (ref 80–100)
NRBC BLD-RTO: 0 /100 WBCS (ref 0–0)
PLATELET # BLD AUTO: 229 X10*3/UL (ref 150–450)
POTASSIUM SERPL-SCNC: 3.9 MMOL/L (ref 3.5–5.3)
Q ONSET: 225 MS
Q ONSET: 226 MS
QRS COUNT: 11 BEATS
QRS COUNT: 12 BEATS
QRS DURATION: 88 MS
QRS DURATION: 90 MS
QT INTERVAL: 410 MS
QT INTERVAL: 436 MS
QTC CALCULATION(BAZETT): 457 MS
QTC CALCULATION(BAZETT): 458 MS
QTC FREDERICIA: 441 MS
QTC FREDERICIA: 450 MS
R AXIS: 100 DEGREES
R AXIS: 102 DEGREES
RBC # BLD AUTO: 4.81 X10*6/UL (ref 4–5.2)
SODIUM SERPL-SCNC: 139 MMOL/L (ref 136–145)
T AXIS: 23 DEGREES
T AXIS: 36 DEGREES
T OFFSET: 441 MS
T OFFSET: 444 MS
VENTRICULAR RATE: 66 BPM
VENTRICULAR RATE: 75 BPM
WBC # BLD AUTO: 5.9 X10*3/UL (ref 4.4–11.3)

## 2024-01-03 PROCEDURE — 85027 COMPLETE CBC AUTOMATED: CPT | Performed by: NURSE PRACTITIONER

## 2024-01-03 PROCEDURE — 80048 BASIC METABOLIC PNL TOTAL CA: CPT | Performed by: NURSE PRACTITIONER

## 2024-01-03 PROCEDURE — 2500000001 HC RX 250 WO HCPCS SELF ADMINISTERED DRUGS (ALT 637 FOR MEDICARE OP): Performed by: NURSE PRACTITIONER

## 2024-01-03 PROCEDURE — 36415 COLL VENOUS BLD VENIPUNCTURE: CPT | Performed by: NURSE PRACTITIONER

## 2024-01-03 PROCEDURE — 94760 N-INVAS EAR/PLS OXIMETRY 1: CPT

## 2024-01-03 PROCEDURE — G0378 HOSPITAL OBSERVATION PER HR: HCPCS

## 2024-01-03 PROCEDURE — 83735 ASSAY OF MAGNESIUM: CPT | Performed by: NURSE PRACTITIONER

## 2024-01-03 PROCEDURE — S4991 NICOTINE PATCH NONLEGEND: HCPCS | Performed by: NURSE PRACTITIONER

## 2024-01-03 PROCEDURE — 2500000002 HC RX 250 W HCPCS SELF ADMINISTERED DRUGS (ALT 637 FOR MEDICARE OP, ALT 636 FOR OP/ED): Performed by: NURSE PRACTITIONER

## 2024-01-03 RX ORDER — LANOLIN ALCOHOL/MO/W.PET/CERES
100 CREAM (GRAM) TOPICAL 3 TIMES DAILY
Qty: 90 TABLET | Refills: 0 | Status: SHIPPED | OUTPATIENT
Start: 2024-01-03 | End: 2024-02-02

## 2024-01-03 RX ADMIN — THIAMINE HCL TAB 100 MG 100 MG: 100 TAB at 08:16

## 2024-01-03 RX ADMIN — ASPIRIN 81 MG CHEWABLE TABLET 81 MG: 81 TABLET CHEWABLE at 08:16

## 2024-01-03 RX ADMIN — APIXABAN 5 MG: 5 TABLET, FILM COATED ORAL at 00:26

## 2024-01-03 RX ADMIN — NICOTINE 1 PATCH: 21 PATCH, EXTENDED RELEASE TRANSDERMAL at 08:16

## 2024-01-03 RX ADMIN — APIXABAN 5 MG: 5 TABLET, FILM COATED ORAL at 10:17

## 2024-01-03 ASSESSMENT — PAIN SCALES - GENERAL
PAINLEVEL_OUTOF10: 0 - NO PAIN

## 2024-01-03 ASSESSMENT — PAIN - FUNCTIONAL ASSESSMENT
PAIN_FUNCTIONAL_ASSESSMENT: 0-10
PAIN_FUNCTIONAL_ASSESSMENT: 0-10

## 2024-01-03 NOTE — CARE PLAN
The patient's goals for the shift include REMAIN HEMODYNAMICALLY STABLE    The clinical goals for the shift include no  injuries  during  this  shift

## 2024-01-03 NOTE — CONSULTS
Consults  History Of Present Illness:    Tomasa Gutierres is a 58 y.o. female presenting with presents to the hospital with an apparent syncopal episode which was unwitnessed.  Patient herself reports that she was taking a nap with her grandson.  She did have a drink of alcohol in the form of a beer.  She got up and she was groggy.  She laid back down.  She states that her daughter may have overreacted and called the emergency squad.  She does occasionally feel palpitations.  She had no prior syncope.  Occasionally lightheaded.  She continues to work and does chores around the house and has no angina, shortness of breath, leg edema, cough hemoptysis.    She did present with an alcohol level of 90.    Past medical history includes atrial fibrillation, posterior circulation stroke.  She has prehypertension.  No history of myocardial infarction or heart failure.  Remote history of deep vein thrombosis    Past surgical includes repair of a humeral fracture, eye surgery, ectopic pregnancy surgery, hernia repair.    Social history shows she smokes half pack cigarettes for today.  40-pack-year history of tobacco.  She has been using alcohol.  No apparent use of street drugs.    She works at a bank.  She is single.    She has an allergy to azithromycin    Gen.: There is no weight change, appetite is normal, there are no colds, flus, fevers or chills.  Eyes: Vision is normal and there is no eye pain.  ENT: No sore throat, sinus congestion or nasal drainage, she is a right submandibular  Mass  Cardiac: See history of present illness   Pulmonary: No shortness of breath, cough, hemoptysis, wheeze, asthma or emphysema, has a questional history of sleep apnea.  Heme/lymph: No swollen glands, cancer  GI: No abdominal pain, change in bowel habits, melena, hematemesis, hematochezia, nausea, vomiting, diarrhea.   : No discharge, dysuria, frequency, urgency, hematuria, kidney failure or kidney stones.  She is  postmenopausal.  Musculoskeletal: No limb pain, joint pain, joint swelling, arthritis  Skin: No rashes, bruising or bleeding  Psych: No depression, anxiety,. There is no memory loss  Endocrine; no diabetes or thyroid disease, she has had hypothyroidism.  Neurologic: She had a posterior circulation stroke.  She has had migraine headaches with aura.  Review of systems is otherwise negative unless stated above or in history of present illness.       Last Recorded Vitals:  Vitals:    01/02/24 2000 01/03/24 0000 01/03/24 0300 01/03/24 0800   BP: 155/72 134/82 128/71 124/79   BP Location: Left arm Right arm Right arm Right arm   Patient Position: Lying Lying Lying Sitting   Pulse: 61 60 75 72   Resp: 20 16 16 17   Temp: 36.4 °C (97.5 °F) 36.3 °C (97.3 °F) 35.8 °C (96.4 °F) 35.7 °C (96.3 °F)   TempSrc: Temporal Temporal Temporal Temporal   SpO2: 96% 94% 97% 99%   Weight:       Height:           Last Labs:  CBC - 1/3/2024:  7:56 AM  5.9 15.5 229    45.8      CMP - 1/3/2024:  7:56 AM  9.0 7.0 24 --- 0.6   3.2 4.0 19 86      PTT - No results in last year.  1.1   12.0 _     Troponin I, High Sensitivity   Date/Time Value Ref Range Status   01/02/2024 01:36 AM 5 0 - 13 ng/L Final   01/02/2024 12:46 AM 5 0 - 13 ng/L Final   12/13/2023 10:54 PM 7 0 - 13 ng/L Final     BNP   Date/Time Value Ref Range Status   12/13/2023 03:35  (H) 0 - 99 pg/mL Final     Hemoglobin A1C   Date/Time Value Ref Range Status   12/14/2023 06:38 AM 5.0 see below % Final     LDL Calculated   Date/Time Value Ref Range Status   12/14/2023 06:38 AM 93 <=99 mg/dL Final     Comment:                                 Near   Borderline      AGE      Desirable  Optimal    High     High     Very High     0-19 Y     0 - 109     ---    110-129   >/= 130     ----    20-24 Y     0 - 119     ---    120-159   >/= 160     ----      >24 Y     0 -  99   100-129  130-159   160-189     >/=190       VLDL   Date/Time Value Ref Range Status   12/14/2023 06:38 AM 16 0 - 40  mg/dL Final   10/04/2018 11:42 AM 10 0 - 40 mg/dL Final      Last I/O:  I/O last 3 completed shifts:  In: 2291.3 (30.6 mL/kg) [P.O.:1440; I.V.:851.3 (11.4 mL/kg)]  Out: 1200 (16 mL/kg) [Urine:1200 (0.4 mL/kg/hr)]  Weight: 74.8 kg     Past Cardiology Tests (Last 3 Years):  EKG:  ECG 12 lead 01/02/2024 (Preliminary)      ECG 12 lead 01/02/2024 (Preliminary) EKG showed atrial fibrillation with controlled ventricular rate otherwise no acute changes and no evolution.  1 EKG did show an Eric beat.      ECG 12 lead 12/14/2023    Echo: Echocardiogram December 2023 showed normal ejection fraction, normal valvular function, normal chamber size.  Transthoracic Echo (TTE) Complete 12/14/2023    Ejection Fractions:  EF   Date/Time Value Ref Range Status   12/14/2023 08:30 AM 61       Cath:  No results found for this or any previous visit from the past 1095 days.    Stress Test:  No results found for this or any previous visit from the past 1095 days.    Cardiac Imaging:  No results found for this or any previous visit from the past 1095 days.      Past Medical History:  She has a past medical history of Atrial fibrillation (CMS/[facility]), Closed fracture of upper end of humerus (11/03/2015), Dry eyes, DVT (deep venous thrombosis) (CMS/[facility]), Elevated liver enzymes (12/13/2023), Intermittent exotropia of right eye, Migraines, Pre-hypertension (12/13/2023), Scoliosis, Shoulder fracture, left, Stroke (CMS/HCC), and Stroke (CMS/HCC) (12/2023).    Past Surgical History:  She has a past surgical history that includes Fracture surgery; Eye surgery (Right); Ectopic pregnancy surgery; Hernia repair; MR angio head wo IV contrast (12/14/2023); and MR angio neck wo IV contrast (12/14/2023).      Social History:  She reports that she has been smoking cigarettes. She has been smoking an average of .5 packs per day. She has never used smokeless tobacco. She reports current alcohol use. She reports that she does not use drugs.    Family  History:  Family History   Problem Relation Name Age of Onset    Cancer Mother      Heart attack Father          dies at the age of 50's    No Known Problems Sister      No Known Problems Son      Other (borderline diabetic) Maternal Grandfather      Heart attack Paternal Grandmother      Stroke Paternal Grandfather          Allergies:  Azithromycin, Orange, and Orange juice    Inpatient Medications:  Scheduled medications   Medication Dose Route Frequency    apixaban  5 mg oral q12h    aspirin  81 mg oral Daily    atorvastatin  40 mg oral Nightly    nicotine  1 patch transdermal Daily    thiamine  100 mg oral TID     PRN medications   Medication    acetaminophen    melatonin    polyethylene glycol     Continuous Medications   Medication Dose Last Rate     Outpatient Medications:  Current Outpatient Medications   Medication Instructions    apixaban (ELIQUIS) 5 mg, oral, Every 12 hours, Start in the morning on 12/16/2023    aspirin 81 mg, oral, Daily    atorvastatin (LIPITOR) 40 mg, oral, Nightly     Telemetry was evaluated and shows atrial fibrillation with rates as low as 38 bpm during sleeping hours but as high as 120 during activity.  No substantial pauses or sustained bradycardias.  Physical Exam:    Physical Exam: Medium build    Appearance: Well-developed, well-nourished in no apparent distress    Skin: Intact,  dry skin, no lesions, rash, petechiae or purpura. Skin is warm and dry with good color    Eyes: PERRLA, EOMs intact,  Conjunctiva pink with no redness or exudates, Eyelids without lesions. No scleral icterus.     ENT: Hearing grossly intact.  Nares patent, mucus membranes moist. Throat clear .    Neck: Supple, without meningismus. Thyroid not palpable. Trachea at midline. No lymphadenopathy. Normal carotid upstroke without bruit no jugular venous distention    Pulmonary: Clear bilaterally with good chest wall excursion. No rales, rhonchi or wheezing. No accessory muscle use or stridor. No  deformity    Cardiac: Normal S1, S2 without murmur, rub, gallop or extrasystole.  Point of maximal impulse is not displaced, rate is irregular.    Abdomen: Soft, nontender, active bowel sounds.  No palpable organomegaly.  No rebound or guarding.  No CVA tenderness.    Genitourinary: Exam deferred.    Musculoskeletal: No obvious deformity.    Extremities: No cyanosis, clubbing, edema    Neurological: Facial muscles symmetric, patient moves all 4 extremities equally. Patient walks without assistance    Psychiatric: Appropriate mood and affect. Insight is good    Vascular: Pulses are 2+ in the upper and lower extremities and equal bilaterally with no radial femoral delay       Assessment/Plan   #1 atrial fibrillation of uncertain duration with the patient having had a posterior circulation stroke approximately 3 weeks ago.  There is an element of sick sinus syndrome.  However the patient has not yet met criteria for pacemaker.  There is some tacky, bradycardia.  Would recommend an outpatient monitor and follow-up in the office.  Once she is 8 weeks out from her stroke, will consider a GOOD with cardioversion procedure.  In the meantime continue anticoagulation.    Have recommended the patient that she discontinue alcohol altogether and continue on her Eliquis therapy.    She also has somewhat fitful sleep which could be related alcohol or possibly underlying sleep apnea.  Recommend outpatient sleep study.    2.  Her syncopal episode likely related to alcohol, sleep disorder.  She herself reports that she did not feel that she was completely syncopal.  Will continue to monitor.    No evidence of significant underlying structural heart disease or coronary disease.    Discussed with the patient  Peripheral IV 01/02/24 20 G Right Antecubital (Active)   Site Assessment Clean;Dry;Intact 01/03/24 0800   Dressing Status Clean;Dry 01/03/24 0800   Number of days: 1       Code Status:  Full Code    I spent 80 minutes in the  professional and overall care of this patient.        Kyle Leslie MD

## 2024-01-03 NOTE — PROGRESS NOTES
Physical Therapy                 Therapy Communication Note    Patient Name: Tomasa Gutierres  MRN: [id number]  Today's Date: 1/3/2024     Discipline: Physical Therapy    Missed Time: Attempt    Comment:  PT orders received, chart reviewed.  On arrival, pt independently standing at sink in room brushing teeth with good stand balance.  Spoke with pt who denies any concerns or skilled therapy needs upon DC from the hospital.  Will DC PT orders.

## 2024-01-03 NOTE — PROGRESS NOTES
Spiritual Care Visit    Clinical Encounter Type  Visited With: Patient  Routine Visit: Introduction         Values/Beliefs  Spiritual Requests During Hospitalization: Phylicia Andrew            Advance Directives  Advance Directives Reviewed Date: 01/03/24  Advance Directives Reviewed with: Patient  Comment: Wants to confer with her sister because they went through this with their parents last year and may have them already              Taxonomy  Intended Effects: Build relationship of care and support, Convey a calming presence, Preserve dignity and respect, Establish rapport and connectedness  Methods: Offer spiritual/Tenriism support, Explore spiritual/Tenriism beliefs  Interventions: Assist someone with Advance Directives

## 2024-01-03 NOTE — CARE PLAN
Problem: Fall/Injury  Goal: Not fall by end of shift  Outcome: Progressing  Goal: Be free from injury by end of the shift  Outcome: Progressing  Goal: Verbalize understanding of personal risk factors for fall in the hospital  Outcome: Progressing  Goal: Verbalize understanding of risk factor reduction measures to prevent injury from fall in the home  Outcome: Progressing  Goal: Use assistive devices by end of the shift  Outcome: Progressing  Goal: Pace activities to prevent fatigue by end of the shift  Outcome: Progressing     Problem: Psychosocial Needs  Goal: Demonstrates ability to cope with hospitalization/illness  Outcome: Progressing  Goal: Collaborate with me, my family, and caregiver to identify my specific goals  Outcome: Progressing     Problem: Daily Care  Goal: Daily care needs are met  Outcome: Progressing     Problem: Safety  Goal: Patient will be injury free during hospitalization  Outcome: Progressing  Goal: I will remain free of falls  Outcome: Progressing   The patient's goals for the shift include REMAIN HEMODYNAMICALLY STABLE    The clinical goals for the shift include no syncopal episodes and no injuries

## 2024-01-18 ENCOUNTER — OFFICE VISIT (OUTPATIENT)
Dept: PRIMARY CARE | Facility: CLINIC | Age: 59
End: 2024-01-18
Payer: COMMERCIAL

## 2024-01-18 ENCOUNTER — OFFICE VISIT (OUTPATIENT)
Dept: CARDIOLOGY | Facility: CLINIC | Age: 59
End: 2024-01-18
Payer: COMMERCIAL

## 2024-01-18 VITALS
DIASTOLIC BLOOD PRESSURE: 92 MMHG | TEMPERATURE: 98.2 F | SYSTOLIC BLOOD PRESSURE: 129 MMHG | HEART RATE: 72 BPM | HEIGHT: 69 IN | BODY MASS INDEX: 23.7 KG/M2 | WEIGHT: 160 LBS

## 2024-01-18 VITALS
HEART RATE: 71 BPM | HEIGHT: 69 IN | DIASTOLIC BLOOD PRESSURE: 82 MMHG | BODY MASS INDEX: 23.7 KG/M2 | SYSTOLIC BLOOD PRESSURE: 132 MMHG | WEIGHT: 160 LBS

## 2024-01-18 DIAGNOSIS — I10 ESSENTIAL HYPERTENSION: ICD-10-CM

## 2024-01-18 DIAGNOSIS — Z86.73 HISTORY OF LEFT PCA STROKE: ICD-10-CM

## 2024-01-18 DIAGNOSIS — F41.9 ANXIETY AND DEPRESSION: ICD-10-CM

## 2024-01-18 DIAGNOSIS — F17.200 NICOTINE DEPENDENCE, UNCOMPLICATED, UNSPECIFIED NICOTINE PRODUCT TYPE: ICD-10-CM

## 2024-01-18 DIAGNOSIS — I48.91 NEW ONSET A-FIB (MULTI): ICD-10-CM

## 2024-01-18 DIAGNOSIS — F17.210 CIGARETTE NICOTINE DEPENDENCE WITHOUT COMPLICATION: ICD-10-CM

## 2024-01-18 DIAGNOSIS — Z12.31 ENCOUNTER FOR SCREENING MAMMOGRAM FOR BREAST CANCER: ICD-10-CM

## 2024-01-18 DIAGNOSIS — Z78.9 ALCOHOL USE: ICD-10-CM

## 2024-01-18 DIAGNOSIS — Z09 HOSPITAL DISCHARGE FOLLOW-UP: Primary | ICD-10-CM

## 2024-01-18 DIAGNOSIS — I48.19 PERSISTENT ATRIAL FIBRILLATION (MULTI): Primary | ICD-10-CM

## 2024-01-18 DIAGNOSIS — R29.818 SUSPECTED SLEEP APNEA: ICD-10-CM

## 2024-01-18 DIAGNOSIS — F32.A ANXIETY AND DEPRESSION: ICD-10-CM

## 2024-01-18 DIAGNOSIS — Z12.11 COLON CANCER SCREENING: ICD-10-CM

## 2024-01-18 DIAGNOSIS — M79.89 MASS OF SOFT TISSUE OF FACE: ICD-10-CM

## 2024-01-18 PROCEDURE — 99213 OFFICE O/P EST LOW 20 MIN: CPT | Performed by: INTERNAL MEDICINE

## 2024-01-18 PROCEDURE — 99215 OFFICE O/P EST HI 40 MIN: CPT | Performed by: INTERNAL MEDICINE

## 2024-01-18 PROCEDURE — 3079F DIAST BP 80-89 MM HG: CPT | Performed by: INTERNAL MEDICINE

## 2024-01-18 PROCEDURE — 3075F SYST BP GE 130 - 139MM HG: CPT | Performed by: INTERNAL MEDICINE

## 2024-01-18 PROCEDURE — 4004F PT TOBACCO SCREEN RCVD TLK: CPT | Performed by: INTERNAL MEDICINE

## 2024-01-18 PROCEDURE — 93000 ELECTROCARDIOGRAM COMPLETE: CPT | Performed by: INTERNAL MEDICINE

## 2024-01-18 RX ORDER — BUPROPION HYDROCHLORIDE 150 MG/1
150 TABLET, EXTENDED RELEASE ORAL 2 TIMES DAILY
Qty: 180 TABLET | Refills: 1 | Status: SHIPPED | OUTPATIENT
Start: 2024-01-18 | End: 2024-06-06 | Stop reason: SDUPTHER

## 2024-01-18 ASSESSMENT — ENCOUNTER SYMPTOMS
OCCASIONAL FEELINGS OF UNSTEADINESS: 0
DEPRESSION: 0
LOSS OF SENSATION IN FEET: 0

## 2024-01-18 ASSESSMENT — PATIENT HEALTH QUESTIONNAIRE - PHQ9
1. LITTLE INTEREST OR PLEASURE IN DOING THINGS: NOT AT ALL
2. FEELING DOWN, DEPRESSED OR HOPELESS: NOT AT ALL
SUM OF ALL RESPONSES TO PHQ9 QUESTIONS 1 AND 2: 0

## 2024-01-18 ASSESSMENT — COLUMBIA-SUICIDE SEVERITY RATING SCALE - C-SSRS
2. HAVE YOU ACTUALLY HAD ANY THOUGHTS OF KILLING YOURSELF?: NO
1. IN THE PAST MONTH, HAVE YOU WISHED YOU WERE DEAD OR WISHED YOU COULD GO TO SLEEP AND NOT WAKE UP?: NO
6. HAVE YOU EVER DONE ANYTHING, STARTED TO DO ANYTHING, OR PREPARED TO DO ANYTHING TO END YOUR LIFE?: NO

## 2024-01-18 NOTE — PROGRESS NOTES
"Chief Complaint:   Please see below.     History Of Present Illness:    Tomasa Gutierres is a 58 y.o. female presenting with atrial fibrillation.    This 58-year-old hypertensive woman with a 40-pack-year history of tobacco abuse was admitted to [facility] [place] in mid December 2023 with a stroke, presenting with symptoms of a headache.  She was in atrial fibrillation, and anticoagulation was initiated.  Her echocardiogram on December 14, 2023 disclosed an ejection fraction of 60 to 65% with a negative bubble study, and an RVSP estimated to be 20 mm.  She was subsequently discharged.    She was aredmitted in early January with an episode initially thought to be syncope.  She was sleeping, and was awakened by the sound of her grandson crying.  The sound awakened other members of the household, who came to check on her.  She was lying on the floor, and did not remember how she got there.  She was observed overnight, and had a variety of tests.  She was seen in my absence by Dr. Leslie.  Her alcohol level was elevated, and the episode was attributed to possibly alcohol intoxication.      She states that from time to time she experiences a feeling \"like something is shaking inside here (pointing to her left chest)\".  This occurs a couple of times a day.  The symptoms last for a couple of minutes before stopping on their own.  The patient denies chest discomfort, dyspnea,  orthopnea, PND, syncope, and near syncope.    She does have non-restorative sleep,  She has never been checked for ANNA.      Last Recorded Vitals:  Vitals:    01/18/24 1300   BP: 132/82   BP Location: Left arm   Patient Position: Sitting   Pulse: 71   Weight: 72.6 kg (160 lb)   Height: 1.753 m (5' 9\")   Body mass index is 23.63 kg/m².      Past Medical History:  She has a past medical history of Atrial fibrillation (CMS/[facility]), Closed fracture of upper end of humerus (11/03/2015), Dry eyes, DVT (deep venous thrombosis) (CMS/HCC), Elevated liver " enzymes (12/13/2023), Intermittent exotropia of right eye, Migraines, Pre-hypertension (12/13/2023), Scoliosis, Shoulder fracture, left, Stroke (CMS/HCC), and Stroke (CMS/HCC) (12/2023).    Past Surgical History:  She has a past surgical history that includes Fracture surgery; Eye surgery (Right); Ectopic pregnancy surgery; Hernia repair; MR angio head wo IV contrast (12/14/2023); and MR angio neck wo IV contrast (12/14/2023).      Social History:  She reports that she has been smoking cigarettes. She has a 7.50 pack-year smoking history. She has never used smokeless tobacco. She reports that she does not currently use alcohol. She reports that she does not use drugs.    Family History:  Family History   Problem Relation Name Age of Onset    Cancer Mother      Heart attack Father          dies at the age of 50's    No Known Problems Sister      No Known Problems Son      Other (borderline diabetic) Maternal Grandfather      Heart attack Paternal Grandmother      Stroke Paternal Grandfather          Allergies:  Azithromycin, Orange, and Orange juice    Outpatient Medications:  Current Outpatient Medications   Medication Instructions    apixaban (ELIQUIS) 5 mg, oral, Every 12 hours, Start in the morning on 12/16/2023    aspirin 81 mg, oral, Daily    atorvastatin (LIPITOR) 40 mg, oral, Nightly    buPROPion SR (WELLBUTRIN SR) 150 mg, oral, 2 times daily, Do not crush, chew, or split.    thiamine (VITAMIN B-1) 100 mg, oral, 3 times daily       Physical Exam:  GENERAL:  pleasant 58 year-old  HEENT: No xanthelasma; Mallampati 1 airway  NECK: Supple, no palpable adenopathy or thyromegaly  CHEST: Clear to auscultation, respiratory effort unlabored  CARDIAC: RRR, normal S1 and S2, no audible murmur, rub, gallop, carotids are brisk, PMI is not displaced  ABD: Active bowel sounds, nontender, no organomegaly, no evidence of ascites  EXT: No clubbing, cyanosis, edema, or tenderness  NEURO: Awake, alert, appropriate, speech is  fluent       Last Labs:  CBC -  Lab Results   Component Value Date    WBC 5.9 01/03/2024    HGB 15.5 01/03/2024    HCT 45.8 01/03/2024    MCV 95 01/03/2024     01/03/2024       CMP -  Lab Results   Component Value Date    CALCIUM 9.0 01/03/2024    PHOS 3.2 01/02/2024    PROT 7.0 01/02/2024    ALBUMIN 4.0 01/02/2024    AST 24 01/02/2024    ALT 19 01/02/2024    ALKPHOS 86 01/02/2024    BILITOT 0.6 01/02/2024       LIPID PANEL -   Lab Results   Component Value Date    CHOL 148 12/14/2023    TRIG 82 12/14/2023    HDL 38.3 12/14/2023    CHHDL 3.9 12/14/2023    LDLF 103 (H) 10/04/2018    VLDL 16 12/14/2023    NHDL 110 12/14/2023       RENAL FUNCTION PANEL -   Lab Results   Component Value Date    GLUCOSE 80 01/03/2024     01/03/2024    K 3.9 01/03/2024     01/03/2024    CO2 25 01/03/2024    ANIONGAP 12 01/03/2024    BUN 8 01/03/2024    CREATININE 0.56 01/03/2024    CALCIUM 9.0 01/03/2024    PHOS 3.2 01/02/2024    ALBUMIN 4.0 01/02/2024        Lab Results   Component Value Date     (H) 12/13/2023    HGBA1C 5.0 12/14/2023         Lab review: I have Chemistry CMP:   Lab Results   Component Value Date    ALBUMIN 4.0 01/02/2024    CALCIUM 9.0 01/03/2024    CO2 25 01/03/2024    CREATININE 0.56 01/03/2024    GLUCOSE 80 01/03/2024    BILITOT 0.6 01/02/2024    PROT 7.0 01/02/2024    ALT 19 01/02/2024    AST 24 01/02/2024    ALKPHOS 86 01/02/2024   , Chemistry BMP   Lab Results   Component Value Date    GLUCOSE 80 01/03/2024    CALCIUM 9.0 01/03/2024    CO2 25 01/03/2024    CREATININE 0.56 01/03/2024   , and CBC:  Lab Results   Component Value Date    WBC 5.9 01/03/2024    RBC 4.81 01/03/2024    HGB 15.5 01/03/2024    HCT 45.8 01/03/2024    MCV 95 01/03/2024    MCH 32.2 01/03/2024    MCHC 33.8 01/03/2024    RDW 12.1 01/03/2024    NRBC 0.0 01/03/2024     Diagnostic review: I have independently interpreted the Echocardiogram .  My findings are as summarized in the report.    Assessment/Plan   Problem List  Items Addressed This Visit          Cardiac and Vasculature    New onset a-fib (CMS/HCC)    Persistent atrial fibrillation (CMS/HCC) - Primary    Relevant Orders    ECG 12 lead (Clinic Performed) (Completed)    Transesophageal Echo (GOOD) With Possible Cardioversion    Follow Up In Cardiology       Mental Health    Alcohol use       Neuro    History of left PCA stroke       Sleep    Suspected sleep apnea    Relevant Orders    Referral to Adult Sleep Medicine       Tobacco    Nicotine dependence    Relevant Orders    Referral to Tobacco Cessation Counseling     Other Visit Diagnoses       Essential hypertension              This 58-year-old hypertensive smoker suffered posterior circulation stroke in December, likely cardioembolic.  She has atrial fibrillation of uncertain duration, and is appropriately anticoagulated with a [id number] score of at least 4.  She experiences symptoms of episodic palpitations as described.  Our approach:    1.  GOOD facilitated cardioversion.  Indications, risk, benefits, alternatives were discussed in detail with the patient.  She understands and wishes to proceed.  She is ASA class III for sedation.  -We discussed the things that the patient can do  to avoid future recurrences of atrial fibrillation which include: avoidance of alcohol. losing weight, if sleep apnea is an issue, getting it treated.    2.  Eventually she will need an ischemia workup based on risk factors and abnormal EKG.  We will wait until AV synchrony is restored before pursuing this.    3.  Above all else, I advised the patient to quit tobacco, or else risk certain future cardiovascular morbidity, and/or mortality.      Toribio Lomeli MD

## 2024-01-18 NOTE — PATIENT INSTRUCTIONS

## 2024-01-18 NOTE — PROGRESS NOTES
Subjective   Patient ID: Tomasa Gutierres is a 58 y.o. female who presents for Establish Care and Hospital Follow-up (Stroke prior to oriana  ).    HPI Patient diagnosed with afib Dec 15th.  She wasn't feeling well for a week.  She went to the urgent care because she didn't well and was referred to the hospital. She was diagnosed with acute ischemic stroke as well. Patient had a syncopal episode on 1/1/2024 and was admitted to [facility]. Denies bleeding in the stool or urine.  She has an appointment with cardiology today and neurology in May. The stroke has caused her some cognitive issues with communication.  She has difficulty with thinking of what she is going to say or respond to questions. No problems swallowing.  She has been compliant with her medications.  She is on Eliquis, aspirin and Lipitor.  She is interested in quitting smoking.  She does have anxiety and depression and with social situations at home that are making this worse.  She was drinking alcohol but has stopped drinking alcohol.  No issues with alcohol withdrawal in the past.  No history of alcohol related seizures.  She has elevated blood pressure in the office today that is mild 129/92.  Denies any history of diabetes or high blood pressure in the past.    Review of Systems   Constitutional:  Negative for chills and fever.   HENT:  Negative for sore throat and trouble swallowing.    Eyes:  Negative for visual disturbance.   Respiratory:  Negative for cough and shortness of breath.    Cardiovascular:  Negative for chest pain, palpitations and leg swelling.   Gastrointestinal:  Negative for abdominal pain, blood in stool, diarrhea, nausea and vomiting.   Genitourinary:  Negative for hematuria.   Skin:  Negative for rash.   Neurological:  Negative for dizziness, seizures, facial asymmetry, weakness, numbness and headaches.   Psychiatric/Behavioral:  Negative for agitation, behavioral problems and dysphoric mood. The patient is nervous/anxious.   "      Objective   BP (!) 129/92   Pulse 72   Temp 36.8 °C (98.2 °F) (Temporal)   Ht 1.753 m (5' 9\")   Wt 72.6 kg (160 lb)   BMI 23.63 kg/m²     Physical Exam  Vitals and nursing note reviewed.   Constitutional:       General: She is not in acute distress.     Appearance: Normal appearance. She is normal weight. She is not toxic-appearing.   HENT:      Head: Normocephalic and atraumatic.      Mouth/Throat:      Mouth: Mucous membranes are moist.      Pharynx: Oropharynx is clear. No oropharyngeal exudate.   Eyes:      Pupils: Pupils are equal, round, and reactive to light.   Cardiovascular:      Rate and Rhythm: Normal rate. Rhythm irregular.      Heart sounds: Normal heart sounds.   Pulmonary:      Effort: Pulmonary effort is normal. No respiratory distress.      Breath sounds: Normal breath sounds. No wheezing.   Abdominal:      General: Bowel sounds are normal. There is no distension.      Palpations: Abdomen is soft.      Tenderness: There is no abdominal tenderness. There is no guarding.   Musculoskeletal:      Cervical back: Neck supple. No tenderness.   Skin:     General: Skin is warm and dry.   Neurological:      Mental Status: She is alert and oriented to person, place, and time.      Cranial Nerves: No cranial nerve deficit.      Sensory: No sensory deficit.      Motor: No weakness.   Psychiatric:         Mood and Affect: Mood normal.         Behavior: Behavior normal.         Thought Content: Thought content normal.         Judgment: Judgment normal.       Assessment/Plan   Problem List Items Addressed This Visit             ICD-10-CM    New onset a-fib (CMS/HCC) I48.91    Nicotine dependence F17.200    Relevant Medications    buPROPion SR (Wellbutrin SR) 150 mg 12 hr tablet    History of left PCA stroke Z86.73    Anxiety and depression F41.9, F32.A    Relevant Medications    buPROPion SR (Wellbutrin SR) 150 mg 12 hr tablet    Mass of soft tissue of face M79.89    Relevant Orders    Referral to ENT    " Encounter for screening mammogram for breast cancer Z12.31    Relevant Orders    BI mammo bilateral screening tomosynthesis    Colon cancer screening Z12.11    Relevant Orders    Cologuard® colon cancer screening    Hospital discharge follow-up - Primary Z09     Hospital discharge follow-up: Patient presents for follow-up from recent stroke and atrial fibrillation new onset.  She is following with cardiology has neurology appointment set up.    New onset A-fib: Patient appropriately anticoagulated.    Anxiety and depression/nicotine dependence: Started on Wellbutrin to help with quitting smoking also help with anxiety and depression follow-up in 6 weeks for recheck    Mass of soft tissue of face: She has have a small mass in the right jaw and we have referred her to ENT for further evaluation and treatment    Encounter for screening mammogram: We have ordered a mammogram    Colon cancer screening: Order Cologuard test

## 2024-01-31 ENCOUNTER — TELEPHONE (OUTPATIENT)
Dept: CARDIOLOGY | Facility: HOSPITAL | Age: 59
End: 2024-01-31
Payer: COMMERCIAL

## 2024-02-01 ENCOUNTER — HOSPITAL ENCOUNTER (OUTPATIENT)
Dept: CARDIOLOGY | Facility: HOSPITAL | Age: 59
Discharge: HOME | End: 2024-02-01
Payer: COMMERCIAL

## 2024-02-01 VITALS
BODY MASS INDEX: 23.7 KG/M2 | OXYGEN SATURATION: 95 % | HEART RATE: 64 BPM | TEMPERATURE: 97.7 F | DIASTOLIC BLOOD PRESSURE: 80 MMHG | WEIGHT: 160 LBS | SYSTOLIC BLOOD PRESSURE: 138 MMHG | RESPIRATION RATE: 12 BRPM | HEIGHT: 69 IN

## 2024-02-01 DIAGNOSIS — I48.19 PERSISTENT ATRIAL FIBRILLATION (MULTI): ICD-10-CM

## 2024-02-01 DIAGNOSIS — I48.91 UNSPECIFIED ATRIAL FIBRILLATION (MULTI): ICD-10-CM

## 2024-02-01 PROCEDURE — 93320 DOPPLER ECHO COMPLETE: CPT | Performed by: INTERNAL MEDICINE

## 2024-02-01 PROCEDURE — 93010 ELECTROCARDIOGRAM REPORT: CPT | Performed by: INTERNAL MEDICINE

## 2024-02-01 PROCEDURE — 7100000001 HC RECOVERY ROOM TIME - INITIAL BASE CHARGE

## 2024-02-01 PROCEDURE — 93312 ECHO TRANSESOPHAGEAL: CPT | Performed by: INTERNAL MEDICINE

## 2024-02-01 PROCEDURE — 3700000012 HC SEDATION LEVEL 5+ TIME - INITIAL 15 MINUTES 5/> YEARS

## 2024-02-01 PROCEDURE — 93320 DOPPLER ECHO COMPLETE: CPT

## 2024-02-01 PROCEDURE — 93325 DOPPLER ECHO COLOR FLOW MAPG: CPT | Performed by: INTERNAL MEDICINE

## 2024-02-01 PROCEDURE — 2500000004 HC RX 250 GENERAL PHARMACY W/ HCPCS (ALT 636 FOR OP/ED): Performed by: INTERNAL MEDICINE

## 2024-02-01 PROCEDURE — 7100000009 HC PHASE TWO TIME - INITIAL BASE CHARGE

## 2024-02-01 PROCEDURE — 7100000010 HC PHASE TWO TIME - EACH INCREMENTAL 1 MINUTE

## 2024-02-01 PROCEDURE — 93005 ELECTROCARDIOGRAM TRACING: CPT | Mod: 59

## 2024-02-01 PROCEDURE — 7100000002 HC RECOVERY ROOM TIME - EACH INCREMENTAL 1 MINUTE

## 2024-02-01 PROCEDURE — 92960 CARDIOVERSION ELECTRIC EXT: CPT | Performed by: INTERNAL MEDICINE

## 2024-02-01 RX ORDER — ATORVASTATIN CALCIUM 40 MG/1
40 TABLET, FILM COATED ORAL NIGHTLY
Status: CANCELLED | OUTPATIENT
Start: 2024-02-01

## 2024-02-01 RX ORDER — LANOLIN ALCOHOL/MO/W.PET/CERES
100 CREAM (GRAM) TOPICAL 3 TIMES DAILY
Status: CANCELLED | OUTPATIENT
Start: 2024-02-01

## 2024-02-01 RX ORDER — MIDAZOLAM HYDROCHLORIDE 1 MG/ML
INJECTION, SOLUTION INTRAMUSCULAR; INTRAVENOUS AS NEEDED
Status: DISCONTINUED | OUTPATIENT
Start: 2024-02-01 | End: 2024-02-02 | Stop reason: HOSPADM

## 2024-02-01 RX ORDER — BUPROPION HYDROCHLORIDE 150 MG/1
150 TABLET, EXTENDED RELEASE ORAL 2 TIMES DAILY
Status: CANCELLED | OUTPATIENT
Start: 2024-02-01

## 2024-02-01 RX ORDER — ENOXAPARIN SODIUM 100 MG/ML
1 INJECTION SUBCUTANEOUS ONCE
Status: COMPLETED | OUTPATIENT
Start: 2024-02-01 | End: 2024-02-01

## 2024-02-01 RX ORDER — PROPOFOL 10 MG/ML
INJECTION, EMULSION INTRAVENOUS AS NEEDED
Status: DISCONTINUED | OUTPATIENT
Start: 2024-02-01 | End: 2024-02-02 | Stop reason: HOSPADM

## 2024-02-01 RX ORDER — NAPROXEN SODIUM 220 MG/1
81 TABLET, FILM COATED ORAL DAILY
Status: CANCELLED | OUTPATIENT
Start: 2024-02-01

## 2024-02-01 RX ORDER — FENTANYL CITRATE 50 UG/ML
INJECTION, SOLUTION INTRAMUSCULAR; INTRAVENOUS AS NEEDED
Status: DISCONTINUED | OUTPATIENT
Start: 2024-02-01 | End: 2024-02-02 | Stop reason: HOSPADM

## 2024-02-01 RX ADMIN — FENTANYL CITRATE 25 MCG: 50 INJECTION, SOLUTION INTRAMUSCULAR; INTRAVENOUS at 09:45

## 2024-02-01 RX ADMIN — PROPOFOL 70 MG: 10 INJECTION, EMULSION INTRAVENOUS at 10:10

## 2024-02-01 RX ADMIN — MIDAZOLAM 2 MG: 1 INJECTION INTRAMUSCULAR; INTRAVENOUS at 09:42

## 2024-02-01 RX ADMIN — MIDAZOLAM 1 MG: 1 INJECTION INTRAMUSCULAR; INTRAVENOUS at 09:46

## 2024-02-01 RX ADMIN — ENOXAPARIN SODIUM 70 MG: 80 INJECTION SUBCUTANEOUS at 09:08

## 2024-02-01 RX ADMIN — FENTANYL CITRATE 50 MCG: 50 INJECTION, SOLUTION INTRAMUSCULAR; INTRAVENOUS at 09:43

## 2024-02-01 ASSESSMENT — PAIN SCALES - GENERAL: PAINLEVEL_OUTOF10: 0 - NO PAIN

## 2024-02-01 ASSESSMENT — COLUMBIA-SUICIDE SEVERITY RATING SCALE - C-SSRS
6. HAVE YOU EVER DONE ANYTHING, STARTED TO DO ANYTHING, OR PREPARED TO DO ANYTHING TO END YOUR LIFE?: NO
1. IN THE PAST MONTH, HAVE YOU WISHED YOU WERE DEAD OR WISHED YOU COULD GO TO SLEEP AND NOT WAKE UP?: NO
2. HAVE YOU ACTUALLY HAD ANY THOUGHTS OF KILLING YOURSELF?: NO

## 2024-02-01 ASSESSMENT — PAIN - FUNCTIONAL ASSESSMENT: PAIN_FUNCTIONAL_ASSESSMENT: 0-10

## 2024-02-01 NOTE — POST-PROCEDURE NOTE
Physician Transition of Care Summary  Invasive Cardiovascular Lab    Procedure Date: 2/1/2024  Attending: Toribio Lomeli MD   Assistants:  Ginette (cath lab), Janice (cath lab), Josseline (sonographer)    Indications:   Atrial fibrillation    Post-procedure diagnosis:   No intracardiac thrombus    Procedure(s):   Transesophageal Echo (GOOD) With Possible Cardioversion    Description of the Procedure:   The patient was brought to the cath lab in a fasting state, and pre-procedure timeout was performed.  Routine monitors were applied, including EKG, BP cuff, pulse oximetry, and end tidal CO2 monitor.  Oxygen 6 LPM by nasal cannula was applied.  Sedation was accomplished with 3 mg of iv Midazolam and 75 micrograms of iv Fentanyl.  The oropharynx was anesthetized with Benzocaine spray.  The patient gargled with viscous lidocaine.  A transesophageal imaging probe was advanced atraumatically into the esophagus, and two dimensional and Doppler images were recorded.  Agitated saline was administered, and contrast images were recorded.  Findings included:      Procedure Findings:   Normal left ventricular size and function.  Mild LAE  Bileaflet MVP with mild to moderate MR  No evidence of intracardiac thrombus or mass.  Normal appearing ascending aorta.    Please see Syngo for complete report      Complications:   The patient tolerated the procedure well, and there were no complications.  Postprocedure timeout was performed.      Estimated Blood Loss:   None      Electronically signed by: Toribio Lomeli MD, 2/1/2024 10:15 AM

## 2024-02-01 NOTE — DISCHARGE INSTRUCTIONS
FOLLOW UP WITH     CALL FOR APPOINTMENT IF NEEDED.  NO DRIVING FOR 24 HOURS.  NO ALCOHOL FOR 24 HOURS.  MAY  SHOWER.  You received Lovenox this morning before your procedure which replaced your morning dose of Eliquis: resume your Eliquis beginning with tonight's regular dose and continue twice daily as ordered.

## 2024-02-01 NOTE — POST-PROCEDURE NOTE
Tomasa Gutierres.     February 1, 2024    Procedure(s):   Synchronized DC cardioversion      Description of the Procedure:     The patient was brought to the cardiac cath lab in a fasting state, and rpre-procedure timeout was performed.  Lovenox 70 mg was administered subcutaneous x 1.  Routine monitors were applied, including EKG, BP cuff, pulse oximetry, and end tidal CO2 monitor.  Preoxygenation was performed with 100% oxygen by face mask.  Sedation was accomplished with 70 mg of intravenous Propofol.  Using anterior and posterior pads, synchronized DC cardioversion was performed with 200 joules biphasic, with successful restoration of sinus rhythm.  Postprocedure timeout was performed.    The patient tolerated the procedure well, and there were no complications.    IMPRESSION:    Successful synchronized DC cardioversion.      Toribio Lomeli MD     37

## 2024-02-02 LAB
BODY SURFACE AREA: 1.88 M2
RIGHT VENTRICLE PEAK SYSTOLIC PRESSURE: 19 MMHG

## 2024-02-07 ENCOUNTER — OFFICE VISIT (OUTPATIENT)
Dept: CARDIOLOGY | Facility: CLINIC | Age: 59
End: 2024-02-07
Payer: COMMERCIAL

## 2024-02-07 VITALS
HEART RATE: 64 BPM | DIASTOLIC BLOOD PRESSURE: 76 MMHG | WEIGHT: 160 LBS | HEIGHT: 69 IN | SYSTOLIC BLOOD PRESSURE: 126 MMHG | BODY MASS INDEX: 23.7 KG/M2

## 2024-02-07 DIAGNOSIS — F17.200 NICOTINE DEPENDENCE, UNCOMPLICATED, UNSPECIFIED NICOTINE PRODUCT TYPE: ICD-10-CM

## 2024-02-07 DIAGNOSIS — I48.19 PERSISTENT ATRIAL FIBRILLATION (MULTI): ICD-10-CM

## 2024-02-07 DIAGNOSIS — I63.532 CEREBROVASCULAR ACCIDENT (CVA) DUE TO OCCLUSION OF LEFT POSTERIOR CEREBRAL ARTERY (MULTI): ICD-10-CM

## 2024-02-07 DIAGNOSIS — I34.0 MITRAL VALVE INSUFFICIENCY, UNSPECIFIED ETIOLOGY: ICD-10-CM

## 2024-02-07 DIAGNOSIS — I48.0 PAROXYSMAL ATRIAL FIBRILLATION (MULTI): Primary | ICD-10-CM

## 2024-02-07 PROCEDURE — 4004F PT TOBACCO SCREEN RCVD TLK: CPT | Performed by: INTERNAL MEDICINE

## 2024-02-07 PROCEDURE — 99214 OFFICE O/P EST MOD 30 MIN: CPT | Performed by: INTERNAL MEDICINE

## 2024-02-07 PROCEDURE — 93000 ELECTROCARDIOGRAM COMPLETE: CPT | Performed by: INTERNAL MEDICINE

## 2024-02-07 NOTE — DISCHARGE SUMMARY
Discharge Diagnosis  Syncope and collapse  Atrial fibrillation  CVA      Discharge Meds     Your medication list        START taking these medications        Instructions Last Dose Given Next Dose Due   thiamine 100 mg tablet  Commonly known as: Vitamin B-1      Take 1 tablet (100 mg) by mouth 3 times a day.              CONTINUE taking these medications        Instructions Last Dose Given Next Dose Due   apixaban 5 mg tablet  Commonly known as: Eliquis      Take 1 tablet (5 mg) by mouth every 12 hours. Start in the morning on 12/16/2023 Do not start before December 16, 2023.       aspirin 81 mg chewable tablet      Chew 1 tablet (81 mg) once daily. Do not start before December 16, 2023.       atorvastatin 40 mg tablet  Commonly known as: Lipitor      Take 1 tablet (40 mg) by mouth once daily at bedtime.                 Where to Get Your Medications        These medications were sent to [id number] - [place], [place] - [address] [facility]  [address] [address]      Phone: [phone]   thiamine 100 mg tablet         Test Results Pending At Discharge  Pending Labs       No current pending labs.            Hospital Course   Patient is 58-year-old female with past with syncopal episode, has history of atrial fibrillation, CVA, patient was evaluated by cardiology and neurology, patient be discharged home to follow-up as an outpatient, patient will get GOOD and cardioversion.    Pertinent Physical Exam At Time of Discharge  Physical Exam  Constitutional:       Appearance: She is normal weight.   HENT:      Head: Normocephalic.      Mouth/Throat:      Mouth: Mucous membranes are moist.      Pharynx: Oropharynx is clear.   Eyes:      Extraocular Movements: Extraocular movements intact.      Conjunctiva/sclera: Conjunctivae normal.      Pupils: Pupils are equal, round, and reactive to light.   Cardiovascular:      Rate and Rhythm: Normal rate. Rhythm irregular.   Pulmonary:      Effort: Pulmonary  effort is normal.      Breath sounds: Normal breath sounds.   Abdominal:      General: Abdomen is flat. Bowel sounds are normal.      Palpations: Abdomen is soft.   Skin:     General: Skin is warm and dry.   Neurological:      Mental Status: She is alert and oriented to person, place, and time. Mental status is at baseline.         Outpatient Follow-Up  Future Appointments   Date Time Provider Department Center   2/7/2024  3:45 PM Toribio Lomeli MD [id number] [place]   2/22/2024  1:00 PM Luis Fernando Morrison DO [facility] [place]   5/24/2024  9:30 AM Clemencia Montaño MD [id number] [place]         Chilo Matthew MD

## 2024-02-07 NOTE — PROGRESS NOTES
"Chief Complaint:   Please see below.     History Of Present Illness:    Tomasa Gutierres is a 58 y.o. female presenting with persistent atrial fibrillation.    This 58-year-old hypertensive woman with a 40-pack-year history of tobacco abuse was admitted to [facility] in mid December 2023 with a stroke.  She was in atrial fibrillation, and anticoagulation was initiated.  Her echocardiogram on December 14, 2023 disclosed an ejection fraction of 60 to 65% with a negative bubble study, and an RVSP estimated to be 20 mm.  She was subsequently discharged.     She was readmitted in early January with an episode initially thought to be syncope.  She was sleeping, and was awakened by the sound of her grandson crying.  The sound awakened other members of the household, who came to check on her.  She was lying on the floor, and did not remember how she got there.  She was observed overnight, and had a variety of tests.  She was seen in my absence by Dr. Leslie.  Her alcohol level was elevated, and the episode was attributed to possibly alcohol intoxication.  She underwent successful GOOD facilitated cardioversion last week, and now is feeling much better.  Her GOOD disclosed myxomatous appearing mitral leaflets with moderate mitral regurgitation, and ejection fraction of 55 to 60%, and no evidence of intracardiac thrombus.  Please see the note for complete details.    The patient denies chest discomfort, dyspnea, palpitations, orthopnea, PND, syncope, and near syncope.  The patient denies bleeding problems on anticoagulation.    She has cut back on her alcohol, and is scheduled to have a sleep study soon.       Last Recorded Vitals:  Vitals:    02/07/24 1500   BP: 126/76   BP Location: Left arm   Patient Position: Sitting   Pulse: 64   Weight: 72.6 kg (160 lb)   Height: 1.753 m (5' 9\")       Past Medical History:  She has a past medical history of Atrial fibrillation (CMS/[facility]), Closed fracture of upper end of humerus " (11/03/2015), Dry eyes, DVT (deep venous thrombosis) (CMS/[facility]), Elevated liver enzymes (12/13/2023), Intermittent exotropia of right eye, Migraines, Pre-hypertension (12/13/2023), Scoliosis, Shoulder fracture, left, Stroke (CMS/[facility]), and Stroke (CMS/[facility]) (12/2023).    Past Surgical History:  She has a past surgical history that includes Fracture surgery; Eye surgery (Right); Ectopic pregnancy surgery; Hernia repair; MR angio head wo IV contrast (12/14/2023); and MR angio neck wo IV contrast (12/14/2023).      Social History:  She reports that she has been smoking cigarettes. She has a 7.50 pack-year smoking history. She has never used smokeless tobacco. She reports that she does not currently use alcohol. She reports that she does not use drugs.    Family History:  Family History   Problem Relation Name Age of Onset    Cancer Mother      Heart attack Father          dies at the age of 50's    No Known Problems Sister      No Known Problems Son      Other (borderline diabetic) Maternal Grandfather      Heart attack Paternal Grandmother      Stroke Paternal Grandfather          Allergies:  Azithromycin, Orange, and Orange juice    Outpatient Medications:  Current Outpatient Medications   Medication Instructions    apixaban (ELIQUIS) 5 mg, oral, Every 12 hours, Start in the morning on 12/16/2023    aspirin 81 mg, oral, Daily    atorvastatin (LIPITOR) 40 mg, oral, Nightly    buPROPion SR (WELLBUTRIN SR) 150 mg, oral, 2 times daily, Do not crush, chew, or split.       Physical Exam:  GENERAL:  pleasant 58 year-old  HEENT: No xanthelasma  NECK: Supple, no palpable adenopathy or thyromegaly  CHEST: Clear to auscultation, respiratory effort unlabored  CARDIAC: RRR, normal S1 and S2, no audible murmur, rub, gallop, carotids are brisk, PMI is not displaced  ABD: Active bowel sounds, nontender, no organomegaly, no evidence of ascites  EXT: No clubbing, cyanosis, edema, or tenderness  NEURO: Awake, alert, appropriate, speech is  fluent       Last Labs:  CBC -  Lab Results   Component Value Date    WBC 5.9 01/03/2024    HGB 15.5 01/03/2024    HCT 45.8 01/03/2024    MCV 95 01/03/2024     01/03/2024       CMP -  Lab Results   Component Value Date    CALCIUM 9.0 01/03/2024    PHOS 3.2 01/02/2024    PROT 7.0 01/02/2024    ALBUMIN 4.0 01/02/2024    AST 24 01/02/2024    ALT 19 01/02/2024    ALKPHOS 86 01/02/2024    BILITOT 0.6 01/02/2024       LIPID PANEL -   Lab Results   Component Value Date    CHOL 148 12/14/2023    TRIG 82 12/14/2023    HDL 38.3 12/14/2023    CHHDL 3.9 12/14/2023    LDLF 103 (H) 10/04/2018    VLDL 16 12/14/2023    NHDL 110 12/14/2023       RENAL FUNCTION PANEL -   Lab Results   Component Value Date    GLUCOSE 80 01/03/2024     01/03/2024    K 3.9 01/03/2024     01/03/2024    CO2 25 01/03/2024    ANIONGAP 12 01/03/2024    BUN 8 01/03/2024    CREATININE 0.56 01/03/2024    CALCIUM 9.0 01/03/2024    PHOS 3.2 01/02/2024    ALBUMIN 4.0 01/02/2024        Lab Results   Component Value Date     (H) 12/13/2023    HGBA1C 5.0 12/14/2023         Lab review: I have Chemistry CMP:   Lab Results   Component Value Date    ALBUMIN 4.0 01/02/2024    CALCIUM 9.0 01/03/2024    CO2 25 01/03/2024    CREATININE 0.56 01/03/2024    GLUCOSE 80 01/03/2024    BILITOT 0.6 01/02/2024    PROT 7.0 01/02/2024    ALT 19 01/02/2024    AST 24 01/02/2024    ALKPHOS 86 01/02/2024   , Chemistry BMP   Lab Results   Component Value Date    GLUCOSE 80 01/03/2024    CALCIUM 9.0 01/03/2024    CO2 25 01/03/2024    CREATININE 0.56 01/03/2024   , and CBC:  Lab Results   Component Value Date    WBC 5.9 01/03/2024    RBC 4.81 01/03/2024    HGB 15.5 01/03/2024    HCT 45.8 01/03/2024    MCV 95 01/03/2024    MCH 32.2 01/03/2024    MCHC 33.8 01/03/2024    RDW 12.1 01/03/2024    NRBC 0.0 01/03/2024     Diagnostic review: I have independently interpreted the GOOD .  My findings are as summarized in the note.    Assessment/Plan   Problem List Items Addressed  This Visit          Cardiac and Vasculature    Persistent atrial fibrillation (CMS/HCC)    Paroxysmal atrial fibrillation (CMS/HCC) - Primary    Relevant Orders    ECG 12 lead (Clinic Performed)    Follow Up In Cardiology       Neuro    Cerebrovascular accident (CVA) due to occlusion of left posterior cerebral artery (CMS/HCC)       Tobacco    Nicotine dependence     Other Visit Diagnoses       Mitral valve insufficiency, unspecified etiology              History of paroxysmal atrial fibrillation, status post GOOD facilitated cardioversion February 1, 2024.  With a [id number] score of 4, she is high risk for stroke and thromboembolism, and is appropriately anticoagulated.  -  We discussed the things that the patient can do  to avoid future recurrences of atrial fibrillation which include: avoidance of alcohol. losing weight, if sleep apnea is an issue, getting it treated.    2.  Moderate mitral regurgitation with myxomatous appearing mitral leaflets: Stable, continue to follow.    3.  Nicotine dependence:  Above all else, I advised the patient to quit tobacco, or else risk certain future cardiovascular morbidity, and/or mortality.  -She has exchanged phone calls with the  tobacco cessation clinic, and plans on connecting.    Toribio Lomeli MD

## 2024-02-07 NOTE — PATIENT INSTRUCTIONS

## 2024-02-22 ENCOUNTER — APPOINTMENT (OUTPATIENT)
Dept: PRIMARY CARE | Facility: CLINIC | Age: 59
End: 2024-02-22
Payer: COMMERCIAL

## 2024-02-28 LAB
ATRIAL RATE: 68 BPM
P AXIS: 21 DEGREES
P OFFSET: 187 MS
P ONSET: 120 MS
PR INTERVAL: 192 MS
Q ONSET: 216 MS
QRS COUNT: 11 BEATS
QRS DURATION: 96 MS
QT INTERVAL: 438 MS
QTC CALCULATION(BAZETT): 465 MS
QTC FREDERICIA: 456 MS
R AXIS: 52 DEGREES
T AXIS: 25 DEGREES
T OFFSET: 435 MS
VENTRICULAR RATE: 68 BPM

## 2024-03-07 ENCOUNTER — OFFICE VISIT (OUTPATIENT)
Dept: PRIMARY CARE | Facility: CLINIC | Age: 59
End: 2024-03-07
Payer: COMMERCIAL

## 2024-03-07 VITALS
DIASTOLIC BLOOD PRESSURE: 81 MMHG | WEIGHT: 154 LBS | HEIGHT: 69 IN | HEART RATE: 80 BPM | SYSTOLIC BLOOD PRESSURE: 128 MMHG | BODY MASS INDEX: 22.81 KG/M2

## 2024-03-07 DIAGNOSIS — I48.0 PAROXYSMAL ATRIAL FIBRILLATION (MULTI): ICD-10-CM

## 2024-03-07 DIAGNOSIS — G43.109 MIGRAINE WITH AURA AND WITHOUT STATUS MIGRAINOSUS, NOT INTRACTABLE: Primary | ICD-10-CM

## 2024-03-07 DIAGNOSIS — Z12.11 COLON CANCER SCREENING: ICD-10-CM

## 2024-03-07 DIAGNOSIS — R74.8 ELEVATED LIVER ENZYMES: ICD-10-CM

## 2024-03-07 DIAGNOSIS — I63.532 CEREBROVASCULAR ACCIDENT (CVA) DUE TO OCCLUSION OF LEFT POSTERIOR CEREBRAL ARTERY (MULTI): ICD-10-CM

## 2024-03-07 PROCEDURE — 99213 OFFICE O/P EST LOW 20 MIN: CPT | Performed by: INTERNAL MEDICINE

## 2024-03-07 ASSESSMENT — PATIENT HEALTH QUESTIONNAIRE - PHQ9
1. LITTLE INTEREST OR PLEASURE IN DOING THINGS: NOT AT ALL
SUM OF ALL RESPONSES TO PHQ9 QUESTIONS 1 AND 2: 0
2. FEELING DOWN, DEPRESSED OR HOPELESS: NOT AT ALL

## 2024-03-07 NOTE — PROGRESS NOTES
"Subjective   Patient ID: Tomasa Gutierres is a 58 y.o. female who presents for Follow-up (stroke).    HPI Patient has history of migraines that is brought on by smells and perfumes.  Patient has history of aura in the past as well.  Patient currently has a migraine that started today.  Patient has cut back on smoking with Wellbutrin.  She still has some anxiety related to financial situations.     Review of Systems    Objective   /81   Pulse 80   Ht 1.753 m (5' 9\")   Wt 69.9 kg (154 lb)   BMI 22.74 kg/m²     Physical Exam    Assessment/Plan   Problem List Items Addressed This Visit             ICD-10-CM    Elevated liver enzymes R74.8    Relevant Orders    Comprehensive metabolic panel    Cerebrovascular accident (CVA) due to occlusion of left posterior cerebral artery (CMS/HCC) I63.532    Relevant Orders    CBC and Auto Differential    Comprehensive metabolic panel    Lipid panel    Colon cancer screening Z12.11    Relevant Orders    Colonoscopy Screening; Average Risk Patient    Paroxysmal atrial fibrillation (CMS/HCC) I48.0    Relevant Orders    CBC and Auto Differential    Comprehensive metabolic panel    Lipid panel    Migraine with aura and without status migrainosus, not intractable - Primary G43.109    Relevant Medications    ubrogepant (Ubrelvy) 100 mg tablet tablet          "

## 2024-03-10 ASSESSMENT — ENCOUNTER SYMPTOMS
SORE THROAT: 0
DYSPHORIC MOOD: 0
DIARRHEA: 0
PALPITATIONS: 0
FEVER: 0
HEADACHES: 0
SHORTNESS OF BREATH: 0
BLOOD IN STOOL: 0
VOMITING: 0
SEIZURES: 0
NERVOUS/ANXIOUS: 1
ABDOMINAL PAIN: 0
WEAKNESS: 0
COUGH: 0
FACIAL ASYMMETRY: 0
CHILLS: 0
AGITATION: 0
NAUSEA: 0
HEMATURIA: 0
TROUBLE SWALLOWING: 0
DIZZINESS: 0
NUMBNESS: 0

## 2024-03-26 DIAGNOSIS — I48.91 NEW ONSET A-FIB (MULTI): ICD-10-CM

## 2024-03-26 DIAGNOSIS — I48.91 NEW ONSET ATRIAL FIBRILLATION (MULTI): ICD-10-CM

## 2024-03-26 NOTE — TELEPHONE ENCOUNTER
Patient called requesting a 90 day refill of her Eliquis 5mg BID to her local [facility] in [place] (phone# [phone]).    To Dr. Banerjee for approval.  ---ssd.

## 2024-03-31 PROBLEM — G43.109 MIGRAINE WITH AURA AND WITHOUT STATUS MIGRAINOSUS, NOT INTRACTABLE: Status: ACTIVE | Noted: 2024-03-31

## 2024-05-24 ENCOUNTER — OFFICE VISIT (OUTPATIENT)
Dept: NEUROLOGY | Facility: CLINIC | Age: 59
End: 2024-05-24
Payer: COMMERCIAL

## 2024-05-24 VITALS
HEIGHT: 68 IN | BODY MASS INDEX: 23.07 KG/M2 | DIASTOLIC BLOOD PRESSURE: 80 MMHG | SYSTOLIC BLOOD PRESSURE: 118 MMHG | HEART RATE: 71 BPM | WEIGHT: 152.2 LBS | TEMPERATURE: 96.9 F

## 2024-05-24 DIAGNOSIS — Z86.73 HISTORY OF LEFT PCA STROKE: Primary | ICD-10-CM

## 2024-05-24 PROBLEM — I63.532 CEREBROVASCULAR ACCIDENT (CVA) DUE TO OCCLUSION OF LEFT POSTERIOR CEREBRAL ARTERY (MULTI): Status: RESOLVED | Noted: 2024-01-02 | Resolved: 2024-05-24

## 2024-05-24 PROCEDURE — 99214 OFFICE O/P EST MOD 30 MIN: CPT | Performed by: STUDENT IN AN ORGANIZED HEALTH CARE EDUCATION/TRAINING PROGRAM

## 2024-05-24 PROCEDURE — 4004F PT TOBACCO SCREEN RCVD TLK: CPT | Performed by: STUDENT IN AN ORGANIZED HEALTH CARE EDUCATION/TRAINING PROGRAM

## 2024-05-24 ASSESSMENT — LIFESTYLE VARIABLES
SKIP TO QUESTIONS 9-10: 1
HOW OFTEN DO YOU HAVE A DRINK CONTAINING ALCOHOL: NEVER
HOW OFTEN DO YOU HAVE SIX OR MORE DRINKS ON ONE OCCASION: NEVER
AUDIT-C TOTAL SCORE: 0
HOW MANY STANDARD DRINKS CONTAINING ALCOHOL DO YOU HAVE ON A TYPICAL DAY: PATIENT DOES NOT DRINK

## 2024-05-24 NOTE — LETTER
"May 24, 2024     Luis Fernando Morrison DO  [address]  [facility] [id number]    Patient: Tomasa Gutierres   YOB: 1965   Date of Visit: 5/24/2024       Dear Dr. Luis Fernando Morrison DO:    Thank you for referring Tomasa Gutierres to me for evaluation. Below are my notes for this consultation.  If you have questions, please do not hesitate to call me. I look forward to following your patient along with you.       Sincerely,     Clemencia Montaño MD      CC: No Recipients  ______________________________________________________________________________________    Subjective    Tomasa Gutierres is a 58 y.o. year old female for follow-up of stroke.     12/13/2023 she was admitted to [facility] with chest discomfort and headache.  CT head showed a left occipital and temporal hypodensity concerning for acute/subacute infarct. She was also found to have new onset atrial fibrillation but then reports years ago she was told she had an irregular heart beat on auscultation but always had normal EKGs. MRI confirmed a small L PCA territory infarct. MRA head and neck had occlusion of the left P2/P3 and a dominant L vertebral artery with congenitally small R vertebral artery. TTE showed normal LVEF, normal LA size, no PFO. She was discharged on Eliquis 5mg BID, aspirin 81mg daily as well as atorvastatin 40mg daily.     1/1-1/3/24 readmitted to [facility]. Diagnosed with a panic attack. Mri brain was stable, no new infarct. GOOD showed mild LAE, no thrombus, no PFO. Continued on Eliquis and aspirin.     5/24/2024 - she has recovered well. Has some trouble recalling names of people. Her baseline \"lazy eye\" has been more outwardly deviated on the R. No visual field deficit. She does not take the Ubrelvy but clarifies that she takes atorvastatin and bupropion in addition to the antithrombotics. She has had no new stroke symptoms. She gets easy bruising but otherwise is tolerating the blood thinners.      She works in , reports her " stats are down a little bit since the stroke. Her supervisor is getting an advocate for her through HR. She has worked there 20 years.     Vascular: tobacco use (1/2 ppd -> 3 cigarettes per day), hx of DVT   Unsure if she snores, does not sleep well and feels fatigued upon awakening     Patient Active Problem List   Diagnosis   • New onset a-fib (Multi)   • Elevated brain natriuretic peptide (BNP) level   • Hyponatremia   • Closed fracture of upper end of humerus   • Elevated liver enzymes   • Pre-hypertension   • Headache   • Visual disturbance   • Nicotine dependence   • Mass of submandibular region   • Excessive involuntary blinking   • Atrial fibrillation (Multi)   • Syncope and collapse   • Alcohol use   • History of left PCA stroke   • Anxiety and depression   • Mass of soft tissue of face   • Encounter for screening mammogram for breast cancer   • Colon cancer screening   • Hospital discharge follow-up   • Persistent atrial fibrillation (Multi)   • Suspected sleep apnea   • Paroxysmal atrial fibrillation (Multi)   • Migraine with aura and without status migrainosus, not intractable       Objective  Neurological Exam  Mental Status  Awake, alert and oriented to person, place and time. Speech is normal.  R neck lesion just below the mandible, about 1 inch in diameter .    Cranial Nerves  CN II: Visual fields full to confrontation.  CN III, IV, VI: Abnormal extraocular movements: R eye exophoria.  CN V: Facial sensation is normal.  CN VII: Full and symmetric facial movement.  CN VIII: Hearing is normal.  CN IX, X: Palate elevates symmetrically  CN XI: Shoulder shrug strength is normal.  CN XII: Tongue midline without atrophy or fasciculations.    Motor   No pronator drift.    Sensory  Light touch is normal in upper and lower extremities.     Coordination  Right: Finger-to-nose normal. Heel-to-shin normal.Left: Finger-to-nose normal. Heel-to-shin normal.    Gait  Casual gait is normal including stance, stride,  and arm swing.    Physical Exam  Eyes:      Extraocular Movements: EOM normal  Psychiatric:         Speech: Speech normal.       Assessment/Plan  Diagnoses and all orders for this visit:  History of left PCA stroke    History of cardioembolic L PCA infarct 2/2 atrial fibrillation: mild reisdual cognitive deficits (impaired naming). Recommend continuing apixiban 5mg BID. Will defer need for aspirin to Cardiology. Continue high intensity statin. Counseled on vascular risk factor modification including tobacco use cessation. Reviewed stroke warning signs    Right neck lesion: has a cystic like mass on the R lateral neck, recommend further evaluation per PCP     Follow-up PRN

## 2024-05-24 NOTE — PROGRESS NOTES
"Trista Gutierres is a 58 y.o. year old female for follow-up of stroke.     12/13/2023 she was admitted to [facility] with chest discomfort and headache.  CT head showed a left occipital and temporal hypodensity concerning for acute/subacute infarct. She was also found to have new onset atrial fibrillation but then reports years ago she was told she had an irregular heart beat on auscultation but always had normal EKGs. MRI confirmed a small L PCA territory infarct. MRA head and neck had occlusion of the left P2/P3 and a dominant L vertebral artery with congenitally small R vertebral artery. TTE showed normal LVEF, normal LA size, no PFO. She was discharged on Eliquis 5mg BID, aspirin 81mg daily as well as atorvastatin 40mg daily.     1/1-1/3/24 readmitted to [facility]. Diagnosed with a panic attack. Mri brain was stable, no new infarct. GOOD showed mild LAE, no thrombus, no PFO. Continued on Eliquis and aspirin.     5/24/2024 - she has recovered well. Has some trouble recalling names of people. Her baseline \"lazy eye\" has been more outwardly deviated on the R. No visual field deficit. She does not take the Ubrelvy but clarifies that she takes atorvastatin and bupropion in addition to the antithrombotics. She has had no new stroke symptoms. She gets easy bruising but otherwise is tolerating the blood thinners.      She works in , reports her stats are down a little bit since the stroke. Her supervisor is getting an advocate for her through HR. She has worked there 20 years.     Vascular: tobacco use (1/2 ppd -> 3 cigarettes per day), hx of DVT   Unsure if she snores, does not sleep well and feels fatigued upon awakening     Patient Active Problem List   Diagnosis    New onset a-fib (Multi)    Elevated brain natriuretic peptide (BNP) level    Hyponatremia    Closed fracture of upper end of humerus    Elevated liver enzymes    Pre-hypertension    Headache    Visual disturbance    Nicotine dependence    Mass " of submandibular region    Excessive involuntary blinking    Atrial fibrillation (Multi)    Syncope and collapse    Alcohol use    History of left PCA stroke    Anxiety and depression    Mass of soft tissue of face    Encounter for screening mammogram for breast cancer    Colon cancer screening    Hospital discharge follow-up    Persistent atrial fibrillation (Multi)    Suspected sleep apnea    Paroxysmal atrial fibrillation (Multi)    Migraine with aura and without status migrainosus, not intractable       Objective   Neurological Exam  Mental Status  Awake, alert and oriented to person, place and time. Speech is normal.  R neck lesion just below the mandible, about 1 inch in diameter .    Cranial Nerves  CN II: Visual fields full to confrontation.  CN III, IV, VI: Abnormal extraocular movements: R eye exophoria.  CN V: Facial sensation is normal.  CN VII: Full and symmetric facial movement.  CN VIII: Hearing is normal.  CN IX, X: Palate elevates symmetrically  CN XI: Shoulder shrug strength is normal.  CN XII: Tongue midline without atrophy or fasciculations.    Motor   No pronator drift.    Sensory  Light touch is normal in upper and lower extremities.     Coordination  Right: Finger-to-nose normal. Heel-to-shin normal.Left: Finger-to-nose normal. Heel-to-shin normal.    Gait  Casual gait is normal including stance, stride, and arm swing.    Physical Exam  Eyes:      Extraocular Movements: EOM normal  Psychiatric:         Speech: Speech normal.       Assessment/Plan   Diagnoses and all orders for this visit:  History of left PCA stroke    History of cardioembolic L PCA infarct 2/2 atrial fibrillation: mild reisdual cognitive deficits (impaired naming). Recommend continuing apixiban 5mg BID. Will defer need for aspirin to Cardiology. Continue high intensity statin. Counseled on vascular risk factor modification including tobacco use cessation. Reviewed stroke warning signs    Right neck lesion: has a cystic like  mass on the R lateral neck, recommend further evaluation per PCP     Follow-up PRN

## 2024-06-06 ENCOUNTER — OFFICE VISIT (OUTPATIENT)
Dept: PRIMARY CARE | Facility: CLINIC | Age: 59
End: 2024-06-06
Payer: COMMERCIAL

## 2024-06-06 VITALS
SYSTOLIC BLOOD PRESSURE: 157 MMHG | BODY MASS INDEX: 22.36 KG/M2 | WEIGHT: 151 LBS | TEMPERATURE: 98 F | HEIGHT: 69 IN | DIASTOLIC BLOOD PRESSURE: 78 MMHG | HEART RATE: 82 BPM

## 2024-06-06 DIAGNOSIS — R22.0 MASS OF SUBMANDIBULAR REGION: ICD-10-CM

## 2024-06-06 DIAGNOSIS — F17.210 CIGARETTE NICOTINE DEPENDENCE WITHOUT COMPLICATION: ICD-10-CM

## 2024-06-06 DIAGNOSIS — F32.A ANXIETY AND DEPRESSION: ICD-10-CM

## 2024-06-06 DIAGNOSIS — G43.109 MIGRAINE WITH AURA AND WITHOUT STATUS MIGRAINOSUS, NOT INTRACTABLE: Primary | ICD-10-CM

## 2024-06-06 DIAGNOSIS — F41.9 ANXIETY AND DEPRESSION: ICD-10-CM

## 2024-06-06 PROCEDURE — 4004F PT TOBACCO SCREEN RCVD TLK: CPT | Performed by: INTERNAL MEDICINE

## 2024-06-06 PROCEDURE — 99213 OFFICE O/P EST LOW 20 MIN: CPT | Performed by: INTERNAL MEDICINE

## 2024-06-06 RX ORDER — ATORVASTATIN CALCIUM 40 MG/1
40 TABLET, FILM COATED ORAL DAILY
COMMUNITY

## 2024-06-06 RX ORDER — BUPROPION HYDROCHLORIDE 150 MG/1
150 TABLET, EXTENDED RELEASE ORAL 2 TIMES DAILY
Qty: 180 TABLET | Refills: 1 | Status: SHIPPED | OUTPATIENT
Start: 2024-06-06 | End: 2024-12-03

## 2024-06-06 ASSESSMENT — ENCOUNTER SYMPTOMS
NAUSEA: 0
LIGHT-HEADEDNESS: 0
SHORTNESS OF BREATH: 0
CHILLS: 0
DIZZINESS: 0
PALPITATIONS: 0
VOMITING: 0
BLOOD IN STOOL: 0
FEVER: 0
DIARRHEA: 0
HEADACHES: 1
ABDOMINAL PAIN: 0
SORE THROAT: 0
COUGH: 0

## 2024-06-06 NOTE — PROGRESS NOTES
"Subjective   Patient ID: Tomasa Gutierres is a 58 y.o. female who presents for Follow-up (3 month ).    HPI  Patient having migraine headaches that are triggered by perfume.  Patient has history of migraines as a teenager.  She is getting 4 migraines a week and last about 2 hours each.  Patient states that a coworker wears very strong perfume and this seems to trigger her migraines.  She does have an aura where she has some disturbance she sees in her eyes when they occur.  Denies any fever, chills, chest pain or shortness of breath, nausea or vomiting.  She did try Ubrelvy once it did not seem to help with her headache she had at the time.  She is willing to try a course of this to see if it would help with her headaches.  We also discussed prophylactic treatment for migraines such as propranolol or Topamax.  She is following with neurology and has seen Dr. Mukesh Centeno vascular neurologist for this.  Patient was given referral for a right neck soft tissue lesion in January and she has not seen specialist for this.  We discussed scheduling appoint with ENT head and neck for further evaluation.  Also due for blood work as colonoscopy order a mammogram order to complete.  We discussed smoking cessation she would like to continue on the Wellbutrin she thinks she ran out of the medication.  She has cut back on cigarettes.    Review of Systems   Constitutional:  Negative for chills and fever.   HENT:  Negative for sore throat.    Eyes:  Negative for visual disturbance.   Respiratory:  Negative for cough and shortness of breath.    Cardiovascular:  Negative for chest pain, palpitations and leg swelling.   Gastrointestinal:  Negative for abdominal pain, blood in stool, diarrhea, nausea and vomiting.   Skin:  Negative for rash.   Neurological:  Positive for headaches. Negative for dizziness, syncope and light-headedness.       Objective   /78   Pulse 82   Temp 36.7 °C (98 °F) (Temporal)   Ht 1.753 m (5' 9\")   Wt 68.5 " kg (151 lb)   BMI 22.30 kg/m²     Physical Exam  Vitals and nursing note reviewed.   Constitutional:       General: She is not in acute distress.     Appearance: She is not ill-appearing, toxic-appearing or diaphoretic.   HENT:      Head: Normocephalic and atraumatic.      Mouth/Throat:      Mouth: Mucous membranes are moist.      Pharynx: Oropharynx is clear. No oropharyngeal exudate.   Cardiovascular:      Rate and Rhythm: Normal rate and regular rhythm.      Heart sounds: Normal heart sounds.   Pulmonary:      Effort: Pulmonary effort is normal. No respiratory distress.      Breath sounds: Normal breath sounds. No wheezing, rhonchi or rales.   Abdominal:      General: Bowel sounds are normal.      Palpations: Abdomen is soft.   Musculoskeletal:      Cervical back: Neck supple. No tenderness.      Right lower leg: No edema.      Left lower leg: No edema.      Comments: 2 cm freely movable soft tissue mass to the right superior neck along just below the jawline   Skin:     General: Skin is warm and dry.      Coloration: Skin is not jaundiced.      Findings: No rash.   Neurological:      Mental Status: She is alert and oriented to person, place, and time. Mental status is at baseline.   Psychiatric:         Mood and Affect: Mood normal.         Behavior: Behavior normal.         Thought Content: Thought content normal.         Judgment: Judgment normal.         Assessment/Plan   Problem List Items Addressed This Visit             ICD-10-CM    Nicotine dependence F17.200    Relevant Medications    buPROPion SR (Wellbutrin SR) 150 mg 12 hr tablet    Mass of submandibular region R22.0    Anxiety and depression F41.9, F32.A    Relevant Medications    buPROPion SR (Wellbutrin SR) 150 mg 12 hr tablet    Migraine with aura and without status migrainosus, not intractable - Primary G43.109    Relevant Medications    ubrogepant (Ubrelvy) 100 mg tablet tablet     Nicotine dependence/anxiety and depression: Will continue  Wellbutrin at this time follow-up in 6 months for recheck    Mass of submandibular region: She has a soft tissue mass in the right upper neck and jawline region for which we have referred her to ENT in January and have also provided her with a reprint of this referral.    Migraine: Will provide a prescription for Ubrelvy and if symptoms or not improved to follow-up for recheck we may need to place her on a daily controller medicine for migraine such as propranolol or Topamax.  I would avoid triptans in this patient given her vascular history.

## 2024-06-15 DIAGNOSIS — I48.91 NEW ONSET ATRIAL FIBRILLATION (MULTI): ICD-10-CM

## 2024-06-17 RX ORDER — APIXABAN 5 MG/1
5 TABLET, FILM COATED ORAL EVERY 12 HOURS
Qty: 180 TABLET | Refills: 3 | Status: SHIPPED | OUTPATIENT
Start: 2024-06-17

## 2024-07-01 DIAGNOSIS — I48.19 PERSISTENT ATRIAL FIBRILLATION (MULTI): ICD-10-CM

## 2024-07-03 RX ORDER — ATORVASTATIN CALCIUM 40 MG/1
40 TABLET, FILM COATED ORAL DAILY
Qty: 90 TABLET | Refills: 3 | Status: SHIPPED | OUTPATIENT
Start: 2024-07-03

## 2024-08-08 ENCOUNTER — APPOINTMENT (OUTPATIENT)
Dept: CARDIOLOGY | Facility: CLINIC | Age: 59
End: 2024-08-08
Payer: COMMERCIAL

## 2024-09-05 ENCOUNTER — APPOINTMENT (OUTPATIENT)
Dept: CARDIOLOGY | Facility: CLINIC | Age: 59
End: 2024-09-05
Payer: COMMERCIAL

## 2024-09-06 ENCOUNTER — TELEPHONE (OUTPATIENT)
Dept: CARDIOLOGY | Facility: CLINIC | Age: 59
End: 2024-09-06
Payer: COMMERCIAL

## 2024-09-06 NOTE — TELEPHONE ENCOUNTER
Patient called to re schedule her missed appointment.     Patient will be seen Monday 9/9.    Patient is requesting a letter stating that she is able to work with no restrictions.     Patient would like to  letter on Monday.     Please advise.

## 2024-09-09 ENCOUNTER — APPOINTMENT (OUTPATIENT)
Dept: CARDIOLOGY | Facility: CLINIC | Age: 59
End: 2024-09-09
Payer: COMMERCIAL

## 2024-09-09 VITALS
DIASTOLIC BLOOD PRESSURE: 80 MMHG | HEART RATE: 68 BPM | SYSTOLIC BLOOD PRESSURE: 144 MMHG | BODY MASS INDEX: 21.77 KG/M2 | HEIGHT: 69 IN | WEIGHT: 147 LBS

## 2024-09-09 DIAGNOSIS — R00.2 PALPITATIONS: ICD-10-CM

## 2024-09-09 DIAGNOSIS — I48.0 PAROXYSMAL ATRIAL FIBRILLATION (MULTI): Primary | ICD-10-CM

## 2024-09-09 DIAGNOSIS — I10 PRIMARY HYPERTENSION: ICD-10-CM

## 2024-09-09 DIAGNOSIS — F17.200 NICOTINE DEPENDENCE, UNCOMPLICATED, UNSPECIFIED NICOTINE PRODUCT TYPE: ICD-10-CM

## 2024-09-09 PROBLEM — R29.818 SUSPECTED SLEEP APNEA: Status: RESOLVED | Noted: 2024-01-18 | Resolved: 2024-09-09

## 2024-09-09 PROCEDURE — 99215 OFFICE O/P EST HI 40 MIN: CPT | Performed by: INTERNAL MEDICINE

## 2024-09-09 PROCEDURE — 3079F DIAST BP 80-89 MM HG: CPT | Performed by: INTERNAL MEDICINE

## 2024-09-09 PROCEDURE — 4004F PT TOBACCO SCREEN RCVD TLK: CPT | Performed by: INTERNAL MEDICINE

## 2024-09-09 PROCEDURE — 3077F SYST BP >= 140 MM HG: CPT | Performed by: INTERNAL MEDICINE

## 2024-09-09 PROCEDURE — 3008F BODY MASS INDEX DOCD: CPT | Performed by: INTERNAL MEDICINE

## 2024-09-09 RX ORDER — SPIRONOLACTONE 25 MG/1
25 TABLET ORAL DAILY
Qty: 30 TABLET | Refills: 11 | Status: SHIPPED | OUTPATIENT
Start: 2024-09-09 | End: 2025-09-09

## 2024-09-09 NOTE — ASSESSMENT & PLAN NOTE
Orders:    spironolactone (Aldactone) 25 mg tablet; Take 1 tablet (25 mg) by mouth once daily.    Basic Metabolic Panel; Future    Follow Up In Cardiology; Future  HTN: BP is not well controlled.   We discussed sodium restriction, lifestyle modification, and the DASH diet.  I advised the patient to log blood pressures daily, and to bring the data to the next appointment.  If home BPs are also high, will need to add, or adjust medications.

## 2024-09-09 NOTE — PROGRESS NOTES
Referred by Dr. Higgins ref. provider found for Please see below.     History Of Present Illness:    Tomasa Gutierres is a 58 y.o. female presenting with PAF.    This 58-year-old hypertensive woman with a 40-pack-year history of tobacco abuse was admitted to [facility] in mid December 2023 with a stroke.  She was in atrial fibrillation, and anticoagulation was initiated.  Her echocardiogram on December 14, 2023 disclosed an ejection fraction of 60 to 65% with a negative bubble study, and an RVSP estimated to be 20 mm.  She was subsequently discharged.     She was readmitted in early January 2024 with an episode initially thought to be syncope.  She was sleeping, and was awakened by the sound of her grandson crying.  The sound awakened other members of the household, who came to check on her.  She was lying on the floor, and did not remember how she got there.  She was observed overnight, and had a variety of tests.  She was seen in my absence by Dr. Leslie.  Her alcohol level was elevated, and the episode was attributed to possibly alcohol intoxication.      She underwent successful GOOD facilitated cardioversion in February 2024, and feels much better since then.  Her GOOD disclosed myxomatous appearing mitral leaflets with moderate mitral regurgitation, and ejection fraction of 55 to 60%, and no evidence of intracardiac thrombus.  Please see the note for complete details.    About a month ago, she experienced palpitations that lasted for a couple of days, and resolved spontaneously.  The patient denies chest discomfort, dyspnea,  orthopnea, PND, syncope, and near syncope.  She goes for a walk in the [place], going 2 miles in 30 minutes 2-3 times  per week.      She has not been checked for ANNA, but sleeps fine, and does not have nonrestorative sleep.      She has given up alcohboil, but is still smoking.          Past Medical History:  She has a past medical history of Atrial fibrillation (Multi), Closed  "fracture of upper end of humerus (11/03/2015), Dry eyes, DVT (deep venous thrombosis) (Multi), Elevated liver enzymes (12/13/2023), Intermittent exotropia of right eye, Migraines, Pre-hypertension (12/13/2023), Scoliosis, Shoulder fracture, left, Stroke (Multi), and Stroke (Multi) (12/2023).    Past Surgical History:  She has a past surgical history that includes Fracture surgery; Eye surgery (Right); Ectopic pregnancy surgery; Hernia repair; MR angio head wo IV contrast (12/14/2023); and MR angio neck wo IV contrast (12/14/2023).      Social History:  She reports that she has been smoking cigarettes. She has a 31.5 pack-year smoking history. She has never used smokeless tobacco. She reports that she does not currently use alcohol. She reports that she does not use drugs.    Family History:  Family History   Problem Relation Name Age of Onset    Cancer Mother      Heart attack Father          dies at the age of 50's    No Known Problems Sister      No Known Problems Son      Other (borderline diabetic) Maternal Grandfather      Heart attack Paternal Grandmother      Stroke Paternal Grandfather          Allergies:  Azithromycin, Orange, and Orange juice    Outpatient Medications:  Current Outpatient Medications   Medication Instructions    aspirin 81 mg, oral, Daily    atorvastatin (LIPITOR) 40 mg, oral, Daily    buPROPion SR (WELLBUTRIN SR) 150 mg, oral, 2 times daily, Do not crush, chew, or split.    Eliquis 5 mg, oral, Every 12 hours    spironolactone (ALDACTONE) 25 mg, oral, Daily    ubrogepant (UBRELVY) 100 mg, oral, Daily PRN        Last Recorded Vitals:  Vitals:    09/09/24 1500   BP: 144/80   BP Location: Left arm   Patient Position: Sitting   Pulse: 68   Weight: 66.7 kg (147 lb)   Height: 1.753 m (5' 9\")       Physical Exam:  GENERAL:  pleasant 58 year-old  HEENT: No xanthelasma  NECK: Supple, no palpable adenopathy or thyromegaly  CHEST: Clear to auscultation, respiratory effort unlabored  CARDIAC: RRR, " normal S1 and S2, no audible  rub, gallop, carotids are brisk, PMI is not displaced; there is a grade 1 systolic murmur heard at the apex.  ABD: Active bowel sounds, nontender, no organomegaly, no evidence of ascites  EXT: No clubbing, cyanosis, edema, or tenderness  NEURO: Awake, alert, appropriate, speech is fluent         Last Labs:  CBC -  Lab Results   Component Value Date    WBC 5.9 01/03/2024    HGB 15.5 01/03/2024    HCT 45.8 01/03/2024    MCV 95 01/03/2024     01/03/2024       CMP -  Lab Results   Component Value Date    CALCIUM 9.0 01/03/2024    PHOS 3.2 01/02/2024    PROT 7.0 01/02/2024    ALBUMIN 4.0 01/02/2024    AST 24 01/02/2024    ALT 19 01/02/2024    ALKPHOS 86 01/02/2024    BILITOT 0.6 01/02/2024       LIPID PANEL -   Lab Results   Component Value Date    CHOL 148 12/14/2023    TRIG 82 12/14/2023    HDL 38.3 12/14/2023    CHHDL 3.9 12/14/2023    LDLF 103 (H) 10/04/2018    VLDL 16 12/14/2023    NHDL 110 12/14/2023       RENAL FUNCTION PANEL -   Lab Results   Component Value Date    GLUCOSE 80 01/03/2024     01/03/2024    K 3.9 01/03/2024     01/03/2024    CO2 25 01/03/2024    ANIONGAP 12 01/03/2024    BUN 8 01/03/2024    CREATININE 0.56 01/03/2024    CALCIUM 9.0 01/03/2024    PHOS 3.2 01/02/2024    ALBUMIN 4.0 01/02/2024        Lab Results   Component Value Date     (H) 12/13/2023    HGBA1C 5.0 12/14/2023         No recent results to review    Assessment/Plan   Assessment & Plan  Paroxysmal atrial fibrillation (Multi)    Orders:    Holter Or Event Cardiac Monitor; Future    Follow Up In Cardiology; Future    Palpitations    Orders:    Holter Or Event Cardiac Monitor; Future    Follow Up In Cardiology; Future    Primary hypertension    Orders:    spironolactone (Aldactone) 25 mg tablet; Take 1 tablet (25 mg) by mouth once daily.    Basic Metabolic Panel; Future    Follow Up In Cardiology; Future  HTN: BP is not well controlled.   We discussed sodium restriction, lifestyle  modification, and the DASH diet.  I advised the patient to log blood pressures daily, and to bring the data to the next appointment.  If home BPs are also high, will need to add, or adjust medications.    Nicotine dependence, uncomplicated, unspecified nicotine product type  Above all else, I advised the patient to quit tobacco, or else risk certain future cardiovascular morbidity, and/or mortality.                 Toribio Lomeli MD

## 2024-09-09 NOTE — ASSESSMENT & PLAN NOTE
Above all else, I advised the patient to quit tobacco, or else risk certain future cardiovascular morbidity, and/or mortality.

## 2024-09-09 NOTE — PATIENT INSTRUCTIONS

## 2024-09-20 ENCOUNTER — APPOINTMENT (OUTPATIENT)
Dept: PRIMARY CARE | Facility: CLINIC | Age: 59
End: 2024-09-20
Payer: COMMERCIAL

## 2024-09-20 VITALS
HEART RATE: 76 BPM | WEIGHT: 145 LBS | DIASTOLIC BLOOD PRESSURE: 74 MMHG | TEMPERATURE: 98.2 F | BODY MASS INDEX: 21.48 KG/M2 | HEIGHT: 69 IN | SYSTOLIC BLOOD PRESSURE: 123 MMHG

## 2024-09-20 DIAGNOSIS — F17.210 CIGARETTE NICOTINE DEPENDENCE WITHOUT COMPLICATION: ICD-10-CM

## 2024-09-20 DIAGNOSIS — H93.8X3 EAR PRESSURE, BILATERAL: ICD-10-CM

## 2024-09-20 DIAGNOSIS — J01.10 ACUTE NON-RECURRENT FRONTAL SINUSITIS: Primary | ICD-10-CM

## 2024-09-20 PROCEDURE — 3074F SYST BP LT 130 MM HG: CPT | Performed by: INTERNAL MEDICINE

## 2024-09-20 PROCEDURE — 4004F PT TOBACCO SCREEN RCVD TLK: CPT | Performed by: INTERNAL MEDICINE

## 2024-09-20 PROCEDURE — 3078F DIAST BP <80 MM HG: CPT | Performed by: INTERNAL MEDICINE

## 2024-09-20 PROCEDURE — 3008F BODY MASS INDEX DOCD: CPT | Performed by: INTERNAL MEDICINE

## 2024-09-20 PROCEDURE — 99213 OFFICE O/P EST LOW 20 MIN: CPT | Performed by: INTERNAL MEDICINE

## 2024-09-20 RX ORDER — PREDNISONE 50 MG/1
50 TABLET ORAL DAILY
Qty: 5 TABLET | Refills: 0 | Status: SHIPPED | OUTPATIENT
Start: 2024-09-20 | End: 2024-09-25

## 2024-09-20 RX ORDER — AMOXICILLIN AND CLAVULANATE POTASSIUM 875; 125 MG/1; MG/1
875 TABLET, FILM COATED ORAL 2 TIMES DAILY
Qty: 20 TABLET | Refills: 0 | Status: SHIPPED | OUTPATIENT
Start: 2024-09-20 | End: 2024-09-30

## 2024-09-20 ASSESSMENT — ENCOUNTER SYMPTOMS
SINUS PRESSURE: 1
COUGH: 0
NAUSEA: 0
SORE THROAT: 0
VOMITING: 0
RHINORRHEA: 1
CHILLS: 0
FEVER: 0
ABDOMINAL PAIN: 0
LIGHT-HEADEDNESS: 0
SHORTNESS OF BREATH: 0
AGITATION: 0
DIARRHEA: 0
DIZZINESS: 1

## 2024-09-20 ASSESSMENT — PATIENT HEALTH QUESTIONNAIRE - PHQ9
SUM OF ALL RESPONSES TO PHQ9 QUESTIONS 1 AND 2: 0
2. FEELING DOWN, DEPRESSED OR HOPELESS: NOT AT ALL
1. LITTLE INTEREST OR PLEASURE IN DOING THINGS: NOT AT ALL

## 2024-09-20 NOTE — PROGRESS NOTES
"Subjective   Patient ID: Tomasa Gutierres is a 58 y.o. female who presents for Sick Visit (Sinus issue ear clogging up).    HPI  Patient started having symptoms one week ago of bilateral ear clogging up and feels like water in the ears and throws her equilibrium off a little bit. Denies fever, chills, body aches. Denies cough. She admits to nasal congestion and drainage in the morning,. Denies Cp or SOB. Admits to sinus pressure frontal region. Denies sore throat. She is using ear drops OTC and nasal spray. She states this feels like when she has had a sinus infection or ear infection in the past. Is not having ear drainage. No actual ear pain.     Review of Systems   Constitutional:  Negative for chills and fever.   HENT:  Positive for congestion, postnasal drip, rhinorrhea and sinus pressure. Negative for ear discharge, ear pain and sore throat.    Eyes:  Negative for visual disturbance.   Respiratory:  Negative for cough and shortness of breath.    Cardiovascular:  Negative for chest pain and leg swelling.   Gastrointestinal:  Negative for abdominal pain, diarrhea, nausea and vomiting.   Skin:  Negative for rash.   Neurological:  Positive for dizziness. Negative for light-headedness.   Psychiatric/Behavioral:  Negative for agitation.        Objective   /74   Pulse 76   Temp 36.8 °C (98.2 °F)   Ht 1.753 m (5' 9\")   Wt 65.8 kg (145 lb)   BMI 21.41 kg/m²     Physical Exam  Vitals and nursing note reviewed.   Constitutional:       General: She is not in acute distress.     Appearance: Normal appearance. She is not ill-appearing, toxic-appearing or diaphoretic.   HENT:      Head: Normocephalic and atraumatic.      Right Ear: Tympanic membrane normal.      Left Ear: Tympanic membrane normal.      Nose: Congestion present.      Mouth/Throat:      Mouth: Mucous membranes are moist.      Pharynx: Oropharynx is clear. Posterior oropharyngeal erythema present. No oropharyngeal exudate.   Eyes:      " Conjunctiva/sclera: Conjunctivae normal.   Cardiovascular:      Rate and Rhythm: Normal rate and regular rhythm.      Heart sounds: Normal heart sounds.   Pulmonary:      Effort: Pulmonary effort is normal. No respiratory distress.      Breath sounds: Normal breath sounds. No wheezing or rhonchi.   Musculoskeletal:      Cervical back: Neck supple.      Right lower leg: No edema.      Left lower leg: No edema.   Lymphadenopathy:      Cervical: No cervical adenopathy.   Skin:     General: Skin is warm and dry.      Coloration: Skin is not jaundiced or pale.      Findings: No rash.   Neurological:      General: No focal deficit present.      Mental Status: She is alert and oriented to person, place, and time.      Cranial Nerves: No cranial nerve deficit.   Psychiatric:         Mood and Affect: Mood normal.         Behavior: Behavior normal.         Thought Content: Thought content normal.         Judgment: Judgment normal.         Assessment/Plan   Problem List Items Addressed This Visit             ICD-10-CM    Nicotine dependence F17.200    Ear pressure, bilateral H93.8X3    Acute non-recurrent frontal sinusitis - Primary J01.10    Relevant Medications    amoxicillin-pot clavulanate (Augmentin) 875-125 mg tablet    predniSONE (Deltasone) 50 mg tablet     Acute nonrecurrent frontal sinusitis: Patient had symptoms for greater than 7 days duration.  Denies any fevers or chills no body aches.  She defers COVID-19 testing.  Will be treated with Augmentin and prednisone for duration as prescribed.  Can continue over-the-counter nasal spray and antihistamine.  Follow-up if symptoms or not improve or worsening.    Bilateral ear pressure: Does not have evidence of a middle ear infection or external ear infection on exam.  Suspect this is more from her upper respiratory congestion contributing to the symptoms.  I recommend antihistamine over-the-counter and nasal spray to be continued along with medications as already  prescribed.  Patient advised to stop smoking.

## 2024-09-25 ENCOUNTER — LAB (OUTPATIENT)
Dept: LAB | Facility: LAB | Age: 59
End: 2024-09-25
Payer: COMMERCIAL

## 2024-09-25 DIAGNOSIS — I48.0 PAROXYSMAL ATRIAL FIBRILLATION (MULTI): ICD-10-CM

## 2024-09-25 DIAGNOSIS — R74.8 ELEVATED LIVER ENZYMES: ICD-10-CM

## 2024-09-25 DIAGNOSIS — I10 PRIMARY HYPERTENSION: ICD-10-CM

## 2024-09-25 DIAGNOSIS — I63.532 CEREBROVASCULAR ACCIDENT (CVA) DUE TO OCCLUSION OF LEFT POSTERIOR CEREBRAL ARTERY (MULTI): ICD-10-CM

## 2024-09-25 PROCEDURE — 80061 LIPID PANEL: CPT

## 2024-09-25 PROCEDURE — 80053 COMPREHEN METABOLIC PANEL: CPT

## 2024-09-25 PROCEDURE — 36415 COLL VENOUS BLD VENIPUNCTURE: CPT

## 2024-09-25 PROCEDURE — 85025 COMPLETE CBC W/AUTO DIFF WBC: CPT

## 2024-09-26 LAB
ALBUMIN SERPL BCP-MCNC: 4.1 G/DL (ref 3.4–5)
ALP SERPL-CCNC: 97 U/L (ref 33–136)
ALT SERPL W P-5'-P-CCNC: 16 U/L (ref 7–45)
ANION GAP SERPL CALC-SCNC: 15 MMOL/L (ref 10–20)
AST SERPL W P-5'-P-CCNC: 14 U/L (ref 9–39)
BASOPHILS # BLD AUTO: 0.04 X10*3/UL (ref 0–0.1)
BASOPHILS NFR BLD AUTO: 0.5 %
BILIRUB SERPL-MCNC: 0.3 MG/DL (ref 0–1.2)
BUN SERPL-MCNC: 10 MG/DL (ref 6–23)
CALCIUM SERPL-MCNC: 8.9 MG/DL (ref 8.6–10.6)
CHLORIDE SERPL-SCNC: 104 MMOL/L (ref 98–107)
CHOLEST SERPL-MCNC: 149 MG/DL (ref 0–199)
CHOLESTEROL/HDL RATIO: 1.9
CO2 SERPL-SCNC: 27 MMOL/L (ref 21–32)
CREAT SERPL-MCNC: 0.67 MG/DL (ref 0.5–1.05)
EGFRCR SERPLBLD CKD-EPI 2021: >90 ML/MIN/1.73M*2
EOSINOPHIL # BLD AUTO: 0.15 X10*3/UL (ref 0–0.7)
EOSINOPHIL NFR BLD AUTO: 1.8 %
ERYTHROCYTE [DISTWIDTH] IN BLOOD BY AUTOMATED COUNT: 11.9 % (ref 11.5–14.5)
GLUCOSE SERPL-MCNC: 96 MG/DL (ref 74–99)
HCT VFR BLD AUTO: 42.5 % (ref 36–46)
HDLC SERPL-MCNC: 79.3 MG/DL
HGB BLD-MCNC: 14.7 G/DL (ref 12–16)
IMM GRANULOCYTES # BLD AUTO: 0.04 X10*3/UL (ref 0–0.7)
IMM GRANULOCYTES NFR BLD AUTO: 0.5 % (ref 0–0.9)
LDLC SERPL CALC-MCNC: 59 MG/DL
LYMPHOCYTES # BLD AUTO: 2.91 X10*3/UL (ref 1.2–4.8)
LYMPHOCYTES NFR BLD AUTO: 35.1 %
MCH RBC QN AUTO: 33 PG (ref 26–34)
MCHC RBC AUTO-ENTMCNC: 34.6 G/DL (ref 32–36)
MCV RBC AUTO: 96 FL (ref 80–100)
MONOCYTES # BLD AUTO: 0.81 X10*3/UL (ref 0.1–1)
MONOCYTES NFR BLD AUTO: 9.8 %
NEUTROPHILS # BLD AUTO: 4.35 X10*3/UL (ref 1.2–7.7)
NEUTROPHILS NFR BLD AUTO: 52.3 %
NON HDL CHOLESTEROL: 70 MG/DL (ref 0–149)
NRBC BLD-RTO: 0 /100 WBCS (ref 0–0)
PLATELET # BLD AUTO: 311 X10*3/UL (ref 150–450)
POTASSIUM SERPL-SCNC: 3.5 MMOL/L (ref 3.5–5.3)
PROT SERPL-MCNC: 6.4 G/DL (ref 6.4–8.2)
RBC # BLD AUTO: 4.45 X10*6/UL (ref 4–5.2)
SODIUM SERPL-SCNC: 142 MMOL/L (ref 136–145)
TRIGL SERPL-MCNC: 52 MG/DL (ref 0–149)
VLDL: 10 MG/DL (ref 0–40)
WBC # BLD AUTO: 8.3 X10*3/UL (ref 4.4–11.3)

## 2024-09-27 ENCOUNTER — HOSPITAL ENCOUNTER (OUTPATIENT)
Dept: CARDIOLOGY | Facility: HOSPITAL | Age: 59
Discharge: HOME | End: 2024-09-27
Payer: COMMERCIAL

## 2024-09-27 DIAGNOSIS — R00.2 PALPITATIONS: ICD-10-CM

## 2024-09-27 DIAGNOSIS — I48.0 PAROXYSMAL ATRIAL FIBRILLATION (MULTI): ICD-10-CM

## 2024-09-27 PROCEDURE — 93246 EXT ECG>7D<15D RECORDING: CPT

## 2024-09-27 NOTE — RESULT ENCOUNTER NOTE
Please let patient know her labs are normal.  Normal sugar level, electrolytes, kidney function, liver enzymes.  Normal blood counts and well-controlled cholesterol.

## 2024-09-30 ENCOUNTER — TELEPHONE (OUTPATIENT)
Dept: CARDIOLOGY | Facility: CLINIC | Age: 59
End: 2024-09-30
Payer: COMMERCIAL

## 2024-09-30 NOTE — TELEPHONE ENCOUNTER
----- Message from Luis Fernando Morrison sent at 9/27/2024  4:44 PM EDT -----  Please let patient know her labs are normal.  Normal sugar level, electrolytes, kidney function, liver enzymes.  Normal blood counts and well-controlled cholesterol.

## 2024-10-03 ENCOUNTER — APPOINTMENT (OUTPATIENT)
Dept: CARDIOLOGY | Facility: CLINIC | Age: 59
End: 2024-10-03
Payer: COMMERCIAL

## 2024-10-03 VITALS
SYSTOLIC BLOOD PRESSURE: 122 MMHG | BODY MASS INDEX: 21.62 KG/M2 | DIASTOLIC BLOOD PRESSURE: 72 MMHG | HEIGHT: 69 IN | HEART RATE: 72 BPM | WEIGHT: 146 LBS

## 2024-10-03 DIAGNOSIS — I10 PRIMARY HYPERTENSION: ICD-10-CM

## 2024-10-03 DIAGNOSIS — R00.2 PALPITATIONS: ICD-10-CM

## 2024-10-03 DIAGNOSIS — I48.0 PAROXYSMAL ATRIAL FIBRILLATION (MULTI): ICD-10-CM

## 2024-10-03 PROCEDURE — 3074F SYST BP LT 130 MM HG: CPT | Performed by: INTERNAL MEDICINE

## 2024-10-03 PROCEDURE — 4004F PT TOBACCO SCREEN RCVD TLK: CPT | Performed by: INTERNAL MEDICINE

## 2024-10-03 PROCEDURE — 3078F DIAST BP <80 MM HG: CPT | Performed by: INTERNAL MEDICINE

## 2024-10-03 PROCEDURE — 3008F BODY MASS INDEX DOCD: CPT | Performed by: INTERNAL MEDICINE

## 2024-10-03 NOTE — PATIENT INSTRUCTIONS
"You need to stop smoking. As you know, smoking increases the risk of future heart attacks, strokes, cancer, and emphysema. In addition to these problems, someone who smokes a pack a day spends $2000 per year on tobacco alone. Those costs add up, so that someone who has smoked a pack a day for twenty years has spent $40,000 out of pocket on tobacco in their lifetime. To help you quit smoking, you should call the [place] Quit Line at 1-575-QUIT-NOW. They will have other tips to help you quit. Also, there are good medicines that can help you quit.  [facility] has a tobacco clinic that can guide you through the process.  Just let me know if you'd like a referral.     You should increase your intake of fresh fruits and vegetables.  Try to consume 9-12 servings per day of such foods.  You should increase your intake of deep sea fish such as salmon and tuna.  Try to get two servings per week of fish, but if you are a pregnant woman, talk to your obstetrician before increasing your fish intake.  You should increase your intake of unprocessed nuts such as walnuts or almonds.  Increase your intake of plant-based protein.  You should avoid fried foods.  Don't consume sugary or starchy foods and sugary drinks.  Avoid saturated fats.  Try not to dine at restaurants more than once per month, and don't dine at fast food places.  Try to get 7-9 hours of sleep every night.  Try to get 150 minutes per week of moderate intensity exercise (after I have cleared you to start an exercise program).  Try to maintain the appropriate weight for your height based on body mass index (BMI). Maintain your cholesterol, blood sugar, and blood pressure in the recommended respective normal ranges.  There is a wealth of information on the American Heart Association's website regarding this.  Just Google \"Life's Essential 8\" for more information.   Ask me about any of these details  if you have questions.    As your cardiologist, I will be " available to you at any time to answer any question you have concerning your heart health.  My staff, Bertha and Kamila can also answer any questions you may have.  Best of luck.       It is important for us to have an accurate list of the medications, supplements, and their doses.  It is also important for us to have an accurate list of your allergies.  Please bring this information to every appointment.  This is a vital part of the quality of care you receive through all of your providers.

## 2024-10-25 ENCOUNTER — APPOINTMENT (OUTPATIENT)
Dept: CARDIOLOGY | Facility: CLINIC | Age: 59
End: 2024-10-25
Payer: COMMERCIAL

## 2024-10-25 VITALS
WEIGHT: 146 LBS | HEART RATE: 72 BPM | HEIGHT: 69 IN | BODY MASS INDEX: 21.62 KG/M2 | DIASTOLIC BLOOD PRESSURE: 84 MMHG | SYSTOLIC BLOOD PRESSURE: 124 MMHG

## 2024-10-25 DIAGNOSIS — I10 PRIMARY HYPERTENSION: ICD-10-CM

## 2024-10-25 DIAGNOSIS — E78.5 DYSLIPIDEMIA: ICD-10-CM

## 2024-10-25 DIAGNOSIS — I48.0 PAROXYSMAL ATRIAL FIBRILLATION (MULTI): Primary | ICD-10-CM

## 2024-10-25 DIAGNOSIS — F17.200 NICOTINE DEPENDENCE, UNCOMPLICATED, UNSPECIFIED NICOTINE PRODUCT TYPE: ICD-10-CM

## 2024-10-25 PROCEDURE — 99214 OFFICE O/P EST MOD 30 MIN: CPT | Performed by: INTERNAL MEDICINE

## 2024-10-25 PROCEDURE — 3079F DIAST BP 80-89 MM HG: CPT | Performed by: INTERNAL MEDICINE

## 2024-10-25 PROCEDURE — 3074F SYST BP LT 130 MM HG: CPT | Performed by: INTERNAL MEDICINE

## 2024-10-25 PROCEDURE — 3008F BODY MASS INDEX DOCD: CPT | Performed by: INTERNAL MEDICINE

## 2024-10-25 PROCEDURE — 4004F PT TOBACCO SCREEN RCVD TLK: CPT | Performed by: INTERNAL MEDICINE

## 2024-10-25 NOTE — PATIENT INSTRUCTIONS
"You need to stop smoking. As you know, smoking increases the risk of future heart attacks, strokes, cancer, and emphysema. In addition to these problems, someone who smokes a pack a day spends $2000 per year on tobacco alone. Those costs add up, so that someone who has smoked a pack a day for twenty years has spent $40,000 out of pocket on tobacco in their lifetime. To help you quit smoking, you should call the [place] [facility]uit Line at 6-554-QUIT-NOW. They will have other tips to help you quit. Also, there are good medicines that can help you quit.  [facility] has a tobacco clinic that can guide you through the process.  Just let me know if you'd like a referral.     You should increase your intake of fresh fruits and vegetables.  Try to consume 9-12 servings per day of such foods.  You should increase your intake of deep sea fish such as salmon and tuna.  Try to get two servings per week of fish, but if you are a pregnant woman, talk to your obstetrician before increasing your fish intake.  You should increase your intake of unprocessed nuts such as walnuts or almonds.  Increase your intake of plant-based protein.  You should avoid fried foods.  Don't consume sugary or starchy foods and sugary drinks.  Avoid saturated fats.  Try not to dine at restaurants more than once per month, and don't dine at fast food places.  Try to get 7-9 hours of sleep every night.  Try to get 150 minutes per week of moderate intensity exercise (after I have cleared you to start an exercise program).  Try to maintain the appropriate weight for your height based on body mass index (BMI). Maintain your cholesterol, blood sugar, and blood pressure in the recommended respective normal ranges.  There is a wealth of information on the American Heart Association's website regarding this.  Just Google \"Life's Essential 8\" for more information.   Ask me about any of these details  if you have questions.    As your cardiologist, I will be " available to you at any time to answer any question you have concerning your heart health.  My staff, Bertha and Kamila can also answer any questions you may have.  Best of luck.       It is important for us to have an accurate list of the medications, supplements, and their doses.  It is also important for us to have an accurate list of your allergies.  Please bring this information to every appointment.  This is a vital part of the quality of care you receive through all of your providers.

## 2024-10-25 NOTE — ASSESSMENT & PLAN NOTE
PAF:stable on anticoagulation, maintaining SR.  Continue present management.  I've reviewed the patient's recent labs.    Orders:    Follow Up In Cardiology; Future

## 2024-10-25 NOTE — PROGRESS NOTES
"Chief Complaint:   Please see below.     History Of Present Illness:    Tomasa Gutierres is a 58 y.o. female presenting with PAF.    This 58-year-old hypertensive woman with a 40-pack-year history of tobacco abuse [place] [facility] [place] with a stroke.  She was in atrial fibrillation, and anticoagulation was initiated.  Her echocardiogram on December 14, 2023 disclosed an ejection fraction of 60 to 65% with a negative bubble study, and an RVSP estimated to be 20 mm.  She was subsequently discharged.     She was readmitted in early January 2024 with an episode initially thought to be syncope.  She was sleeping, and was awakened by the sound of her grandson crying.  The sound awakened other members of the household, who came to check on her.  She was lying on the floor, and did not remember how she got there.  She was observed overnight, and had a variety of tests.  She was seen in my absence by Dr. Leslie.  Her alcohol level was elevated, and the episode was attributed to possibly alcohol intoxication.       She underwent successful GOOD facilitated cardioversion in February 2024, and feels much better since then.  Her GOOD disclosed myxomatous appearing mitral leaflets with moderate mitral regurgitation, and ejection fraction of 55 to 60%, and no evidence of intracardiac thrombus.  Please see the note for complete details.    She has had rare episodes of of palpitations.  The patient denies chest discomfort, dyspnea, palpitations, orthopnea, PND, syncope, and near syncope.  She walks during her lunch hour, for 30 minutes.    The patient is still smoking despite my warnings.  She is not consuming alcohol.         Last Recorded Vitals:  Vitals:    10/25/24 0700   BP: 124/84   BP Location: Left arm   Patient Position: Sitting   Pulse: 72   Weight: 66.2 kg (146 lb)   Height: 1.753 m (5' 9\")       Past Medical History:  She has a past medical history of Atrial fibrillation (Multi), Closed " fracture of upper end of humerus (11/03/2015), Dry eyes, DVT (deep venous thrombosis) (Multi), Elevated liver enzymes (12/13/2023), Intermittent exotropia of right eye, Migraines, Pre-hypertension (12/13/2023), Scoliosis, Shoulder fracture, left, Stroke (Multi), and Stroke (Multi) (12/2023).    Past Surgical History:  She has a past surgical history that includes Fracture surgery; Eye surgery (Right); Ectopic pregnancy surgery; Hernia repair; MR angio head wo IV contrast (12/14/2023); and MR angio neck wo IV contrast (12/14/2023).      Social History:  She reports that she has been smoking cigarettes. She has a 7.5 pack-year smoking history. She has never used smokeless tobacco. She reports that she does not currently use alcohol. She reports that she does not use drugs.    Family History:  Family History   Problem Relation Name Age of Onset    Cancer Mother      Heart attack Father          dies at the age of 50's    No Known Problems Sister      No Known Problems Son      Other (borderline diabetic) Maternal Grandfather      Heart attack Paternal Grandmother      Stroke Paternal Grandfather          Allergies:  Azithromycin, Orange, and Orange juice    Outpatient Medications:  Current Outpatient Medications   Medication Instructions    aspirin 81 mg, oral, Daily    atorvastatin (LIPITOR) 40 mg, oral, Daily    buPROPion SR (WELLBUTRIN SR) 150 mg, oral, 2 times daily, Do not crush, chew, or split.    Eliquis 5 mg, oral, Every 12 hours    spironolactone (ALDACTONE) 25 mg, oral, Daily    ubrogepant (UBRELVY) 100 mg, oral, Daily PRN       Physical Exam:  GENERAL:  pleasant 58 year-old  HEENT: No xanthelasma  NECK: Supple, no palpable adenopathy or thyromegaly  CHEST: Clear to auscultation, respiratory effort unlabored  CARDIAC: RRR, normal S1 and S2, no audible murmur, rub, gallop, carotids are brisk, PMI is not displaced  ABD: Active bowel sounds, nontender, no organomegaly, no evidence of ascites  EXT: No clubbing,  cyanosis, edema, or tenderness  NEURO: Awake, alert, appropriate, speech is fluent       Last Labs:  CBC -  Lab Results   Component Value Date    WBC 8.3 09/25/2024    HGB 14.7 09/25/2024    HCT 42.5 09/25/2024    MCV 96 09/25/2024     09/25/2024       CMP -  Lab Results   Component Value Date    CALCIUM 8.9 09/25/2024    PHOS 3.2 01/02/2024    PROT 6.4 09/25/2024    ALBUMIN 4.1 09/25/2024    AST 14 09/25/2024    ALT 16 09/25/2024    ALKPHOS 97 09/25/2024    BILITOT 0.3 09/25/2024       LIPID PANEL -   Lab Results   Component Value Date    CHOL 149 09/25/2024    TRIG 52 09/25/2024    HDL 79.3 09/25/2024    CHHDL 1.9 09/25/2024    LDLF 103 (H) 10/04/2018    VLDL 10 09/25/2024    NHDL 70 09/25/2024       RENAL FUNCTION PANEL -   Lab Results   Component Value Date    GLUCOSE 96 09/25/2024     09/25/2024    K 3.5 09/25/2024     09/25/2024    CO2 27 09/25/2024    ANIONGAP 15 09/25/2024    BUN 10 09/25/2024    CREATININE 0.67 09/25/2024    CALCIUM 8.9 09/25/2024    PHOS 3.2 01/02/2024    ALBUMIN 4.1 09/25/2024        Lab Results   Component Value Date     (H) 12/13/2023    HGBA1C 5.0 12/14/2023         Lab review: I have Chemistry CMP:   Lab Results   Component Value Date    ALBUMIN 4.1 09/25/2024    CALCIUM 8.9 09/25/2024    CO2 27 09/25/2024    CREATININE 0.67 09/25/2024    GLUCOSE 96 09/25/2024    BILITOT 0.3 09/25/2024    PROT 6.4 09/25/2024    ALT 16 09/25/2024    AST 14 09/25/2024    ALKPHOS 97 09/25/2024   , Chemistry BMP   Lab Results   Component Value Date    GLUCOSE 96 09/25/2024    CALCIUM 8.9 09/25/2024    CO2 27 09/25/2024    CREATININE 0.67 09/25/2024   , CBC:  Lab Results   Component Value Date    WBC 8.3 09/25/2024    RBC 4.45 09/25/2024    HGB 14.7 09/25/2024    HCT 42.5 09/25/2024    MCV 96 09/25/2024    MCH 33.0 09/25/2024    MCHC 34.6 09/25/2024    RDW 11.9 09/25/2024    NRBC 0.0 09/25/2024   , and Lipids:   Lab Results   Component Value Date    CHOL 149 09/25/2024    HDL 79.3  09/25/2024    LDLCALC 59 09/25/2024    TRIG 52 09/25/2024     Diagnostic review: I have independently interpreted the Holter Monitor .  My findings are as summarized in the report.    Assessment/Plan   Assessment & Plan  Paroxysmal atrial fibrillation (Multi)  PAF:stable on anticoagulation, maintaining SR.  Continue present management.  I've reviewed the patient's recent labs.    Orders:    Follow Up In Cardiology; Future    Primary hypertension  HTN:  BP is well controlled.   We discussed sodium restriction, lifestyle modification, and the DASH diet.  I advised the patient to check BPs at home daily, and to contact me with an update in one month.    Orders:    Follow Up In Cardiology; Future    Dyslipidemia    Orders:    Follow Up In Cardiology; Future    Nicotine dependence, uncomplicated, unspecified nicotine product type  Above all else, I advised the patient to quit tobacco, or else risk certain future cardiovascular morbidity, and/or mortality.             Toribio Lomeli MD

## 2024-11-07 ENCOUNTER — APPOINTMENT (OUTPATIENT)
Dept: PRIMARY CARE | Facility: CLINIC | Age: 59
End: 2024-11-07
Payer: COMMERCIAL

## 2024-11-07 VITALS
SYSTOLIC BLOOD PRESSURE: 90 MMHG | HEART RATE: 72 BPM | HEIGHT: 69 IN | BODY MASS INDEX: 21.62 KG/M2 | DIASTOLIC BLOOD PRESSURE: 58 MMHG | WEIGHT: 146 LBS

## 2024-11-07 DIAGNOSIS — H66.90 ACUTE OTITIS MEDIA, UNSPECIFIED OTITIS MEDIA TYPE: Primary | ICD-10-CM

## 2024-11-07 PROCEDURE — 99213 OFFICE O/P EST LOW 20 MIN: CPT | Performed by: FAMILY MEDICINE

## 2024-11-07 PROCEDURE — 3074F SYST BP LT 130 MM HG: CPT | Performed by: FAMILY MEDICINE

## 2024-11-07 PROCEDURE — 4004F PT TOBACCO SCREEN RCVD TLK: CPT | Performed by: FAMILY MEDICINE

## 2024-11-07 PROCEDURE — 3078F DIAST BP <80 MM HG: CPT | Performed by: FAMILY MEDICINE

## 2024-11-07 PROCEDURE — 3008F BODY MASS INDEX DOCD: CPT | Performed by: FAMILY MEDICINE

## 2024-11-07 RX ORDER — NEOMYCIN SULFATE, POLYMYXIN B SULFATE, HYDROCORTISONE 3.5; 10000; 1 MG/ML; [USP'U]/ML; MG/ML
SOLUTION/ DROPS AURICULAR (OTIC)
COMMUNITY
Start: 2024-11-01

## 2024-11-07 RX ORDER — AMOXICILLIN AND CLAVULANATE POTASSIUM 875; 125 MG/1; MG/1
875 TABLET, FILM COATED ORAL 2 TIMES DAILY
Qty: 20 TABLET | Refills: 0 | Status: SHIPPED | OUTPATIENT
Start: 2024-11-07 | End: 2024-11-17

## 2024-11-07 ASSESSMENT — ENCOUNTER SYMPTOMS
ACTIVITY CHANGE: 0
FATIGUE: 0
DIZZINESS: 0
HEADACHES: 0
FEVER: 0
SHORTNESS OF BREATH: 0

## 2024-11-07 ASSESSMENT — PATIENT HEALTH QUESTIONNAIRE - PHQ9
2. FEELING DOWN, DEPRESSED OR HOPELESS: NOT AT ALL
1. LITTLE INTEREST OR PLEASURE IN DOING THINGS: NOT AT ALL
SUM OF ALL RESPONSES TO PHQ9 QUESTIONS 1 AND 2: 0

## 2024-11-07 NOTE — PROGRESS NOTES
"Subjective   Patient ID: Tomasa Gutierres is a 58 y.o. female who presents for Sick Visit (Sinus and ear pain for about a month).    Ear symptoms   - reports she was treated for ear infection in the end of September with steroid and oral antibiotics   - symptoms resolved, but approximately 1.5 weeks ago symptoms returned   - reports she has been having issues with pain, pressure, feeling like she is \"underwater\"   - denies any fevers/chills, denies any body aches   - does endorse some sinus pressure   - does get popping and snapping in the ears   - no drainage or discharge from the ears          Review of Systems   Constitutional:  Negative for activity change, fatigue and fever.   Respiratory:  Negative for shortness of breath.    Cardiovascular:  Negative for chest pain.   Neurological:  Negative for dizziness and headaches.       Objective   BP 90/58   Pulse 72   Ht 1.753 m (5' 9\")   Wt 66.2 kg (146 lb)   BMI 21.56 kg/m²     Physical Exam  Constitutional:       Appearance: Normal appearance.   Neurological:      Mental Status: She is alert.         Assessment/Plan   Problem List Items Addressed This Visit    None  Visit Diagnoses         Codes    Acute otitis media, unspecified otitis media type    -  Primary H66.90    stable  - discussed daily flonase for prophylaxis   - treat with oral augmentin   - f/u if symptoms are not improving   - consider ENT referral     Relevant Medications    amoxicillin-pot clavulanate (Augmentin) 875-125 mg tablet               "

## 2024-11-11 NOTE — CONSULTS
Body Location Override (Optional - Billing Will Still Be Based On Selected Body Map Location If Applicable): R ventral proximal forearm Inpatient consult to Cardiology  Consult performed by: Toribio Lomeli MD  Consult ordered by: ADRYAN Mathur-CNP        History Of Present Illness:    Tomasa Gutierres is a 58 y.o. female presenting with atrial fibrillation, stroke.    I am seeing this 58-year-old hypertensive woman with a 40-pack-year history of tobacco abuse for evaluation of atrial fibrillation in the context of a recent stroke.  The patient has never had any cardiac issues or problems.  She has never previously had any cardiac testing done.  She has not been feeling well for the past 5 or 6 days.  She started experiencing headaches 6 days ago.  She went to urgent care center and was sent to [facility] because of irregular rhythm.  While here, she was found to have atrial fibrillation, and further radiographic workup has revealed evidence of stroke.  Patient has not had heaviness or pressure in her chest.  She denies dyspnea, palpitations, orthopnea, PND, syncope, and near syncope.  She admits to sleeping poorly.  She has had nonrestorative sleep for years.  She has never previously been checked for sleep apnea.  She consumes 4 beers per day 3 to 4 days/week.  She works at [employer].  She is not .    Past medical history notable for prior DVT treated with warfarin with no recurrence.  She has a right submandibular mass which is being evaluated by the primary care team.     Last Recorded Vitals:  Vitals:    12/14/23 0730 12/14/23 0800 12/14/23 0830 12/14/23 0900   BP:    111/74   BP Location:    Right arm   Patient Position:       Pulse: 72  70 66   Resp: 17  16 20   Temp:  36.6 °C (97.9 °F)  36.6 °C (97.9 °F)   TempSrc:    Temporal   SpO2: 95%  97% 98%   Weight:       Height:       Body mass index is 23.63 kg/m².      Last Labs:    Results from last 7 days   Lab Units 12/14/23  0638 12/13/23  1535   SODIUM mmol/L 136 131*   POTASSIUM mmol/L 4.0 3.7   CHLORIDE mmol/L 104 95*   CO2 mmol/L 23 22   BUN mg/dL 10 10    CREATININE mg/dL 0.56 0.59   CALCIUM mg/dL 8.7 9.1   PROTEIN TOTAL g/dL  --  7.9   BILIRUBIN TOTAL mg/dL  --  0.8   ALK PHOS U/L  --  93   ALT U/L  --  11   AST U/L  --  22   GLUCOSE mg/dL 81 85        Results for orders placed or performed during the hospital encounter of 12/13/23 (from the past 24 hour(s))   CBC with Differential   Result Value Ref Range    WBC 9.4 4.4 - 11.3 x10*3/uL    nRBC 0.0 0.0 - 0.0 /100 WBCs    RBC 5.75 (H) 4.00 - 5.20 x10*6/uL    Hemoglobin 18.4 (H) 12.0 - 16.0 g/dL    Hematocrit 54.8 (H) 36.0 - 46.0 %    MCV 95 80 - 100 fL    MCH 32.0 26.0 - 34.0 pg    MCHC 33.6 32.0 - 36.0 g/dL    RDW 12.2 11.5 - 14.5 %    Platelets 304 150 - 450 x10*3/uL    Neutrophils % 61.9 40.0 - 80.0 %    Immature Granulocytes %, Automated 0.3 0.0 - 0.9 %    Lymphocytes % 25.4 13.0 - 44.0 %    Monocytes % 10.3 2.0 - 10.0 %    Eosinophils % 1.2 0.0 - 6.0 %    Basophils % 0.9 0.0 - 2.0 %    Neutrophils Absolute 5.83 1.20 - 7.70 x10*3/uL    Immature Granulocytes Absolute, Automated 0.03 0.00 - 0.70 x10*3/uL    Lymphocytes Absolute 2.39 1.20 - 4.80 x10*3/uL    Monocytes Absolute 0.97 0.10 - 1.00 x10*3/uL    Eosinophils Absolute 0.11 0.00 - 0.70 x10*3/uL    Basophils Absolute 0.08 0.00 - 0.10 x10*3/uL   Comprehensive Metabolic Panel   Result Value Ref Range    Glucose 85 74 - 99 mg/dL    Sodium 131 (L) 136 - 145 mmol/L    Potassium 3.7 3.5 - 5.3 mmol/L    Chloride 95 (L) 98 - 107 mmol/L    Bicarbonate 22 21 - 32 mmol/L    Anion Gap 18 10 - 20 mmol/L    Urea Nitrogen 10 6 - 23 mg/dL    Creatinine 0.59 0.50 - 1.05 mg/dL    eGFR >90 >60 mL/min/1.73m*2    Calcium 9.1 8.6 - 10.3 mg/dL    Albumin 4.4 3.4 - 5.0 g/dL    Alkaline Phosphatase 93 33 - 136 U/L    Total Protein 7.9 6.4 - 8.2 g/dL    AST 22 9 - 39 U/L    Bilirubin, Total 0.8 0.0 - 1.2 mg/dL    ALT 11 7 - 45 U/L   Brain Natriuretic Peptide   Result Value Ref Range     (H) 0 - 99 pg/mL   Troponin I, High Sensitivity   Result Value Ref Range    Troponin I, High  Detail Level: Detailed Add 1585x Cpt? (Do Not Bill If You Billed For The Procedure Placing The Sutures. This Is An Add-On Code That Must Be Billed With An E/M Visit Code): No Sensitivity 6 0 - 13 ng/L   Protime-INR   Result Value Ref Range    Protime 12.0 9.8 - 12.8 seconds    INR 1.1 0.9 - 1.1   Troponin I, High Sensitivity   Result Value Ref Range    Troponin I, High Sensitivity 7 0 - 13 ng/L   Magnesium   Result Value Ref Range    Magnesium 2.14 1.60 - 2.40 mg/dL   CBC   Result Value Ref Range    WBC 6.8 4.4 - 11.3 x10*3/uL    nRBC 0.0 0.0 - 0.0 /100 WBCs    RBC 5.20 4.00 - 5.20 x10*6/uL    Hemoglobin 16.7 (H) 12.0 - 16.0 g/dL    Hematocrit 49.3 (H) 36.0 - 46.0 %    MCV 95 80 - 100 fL    MCH 32.1 26.0 - 34.0 pg    MCHC 33.9 32.0 - 36.0 g/dL    RDW 12.3 11.5 - 14.5 %    Platelets 251 150 - 450 x10*3/uL   Basic metabolic panel   Result Value Ref Range    Glucose 81 74 - 99 mg/dL    Sodium 136 136 - 145 mmol/L    Potassium 4.0 3.5 - 5.3 mmol/L    Chloride 104 98 - 107 mmol/L    Bicarbonate 23 21 - 32 mmol/L    Anion Gap 13 10 - 20 mmol/L    Urea Nitrogen 10 6 - 23 mg/dL    Creatinine 0.56 0.50 - 1.05 mg/dL    eGFR >90 >60 mL/min/1.73m*2    Calcium 8.7 8.6 - 10.3 mg/dL   Phosphorus   Result Value Ref Range    Phosphorus 4.2 2.5 - 4.9 mg/dL   Lipid Panel   Result Value Ref Range    Cholesterol 148 0 - 199 mg/dL    HDL-Cholesterol 38.3 mg/dL    Cholesterol/HDL Ratio 3.9     LDL Calculated 93 <=99 mg/dL    VLDL 16 0 - 40 mg/dL    Triglycerides 82 0 - 149 mg/dL    Non HDL Cholesterol 110 0 - 149 mg/dL   TSH   Result Value Ref Range    Thyroid Stimulating Hormone 1.95 0.44 - 3.98 mIU/L   T4, free   Result Value Ref Range    Thyroxine, Free 1.25 (H) 0.61 - 1.12 ng/dL   Transthoracic Echo (TTE) Complete   Result Value Ref Range    BSA 1.88 m2         PTT - No results in last year.  1.1   12.0 _     Troponin I, High Sensitivity   Date/Time Value Ref Range Status   12/13/2023 10:54 PM 7 0 - 13 ng/L Final   12/13/2023 03:35 PM 6 0 - 13 ng/L Final     BNP   Date/Time Value Ref Range Status   12/13/2023 03:35  (H) 0 - 99 pg/mL Final     LDL Calculated   Date/Time Value Ref Range Status    12/14/2023 06:38 AM 93 <=99 mg/dL Final     Comment:                                 Near   Borderline      AGE      Desirable  Optimal    High     High     Very High     0-19 Y     0 - 109     ---    110-129   >/= 130     ----    20-24 Y     0 - 119     ---    120-159   >/= 160     ----      >24 Y     0 -  99   100-129  130-159   160-189     >/=190       VLDL   Date/Time Value Ref Range Status   12/14/2023 06:38 AM 16 0 - 40 mg/dL Final   10/04/2018 11:42 AM 10 0 - 40 mg/dL Final      Last I/O:  I/O last 3 completed shifts:  In: 407.5 (5.6 mL/kg) [I.V.:407.5 (5.6 mL/kg)]  Out: - (0 mL/kg)   Weight: 72.6 kg     Past Medical History:  She has a past medical history of Closed fracture of upper end of humerus (11/03/2015), Dry eyes, Elevated liver enzymes (12/13/2023), Intermittent exotropia of right eye, Migraines, Pre-hypertension (12/13/2023), Scoliosis, Shoulder fracture, left, and Stroke (CMS/[facility]ly [facility]).    Past Surgical History:  She has a past surgical history that includes Fracture surgery; Eye surgery (Right); Ectopic pregnancy surgery; and Hernia repair.      Social History:  She reports that she has been smoking cigarettes. She has been smoking an average of .5 packs per day. She has never used smokeless tobacco. She reports that she does not currently use alcohol. She reports that she does not use drugs.    Family History:  Family History   Problem Relation Name Age of Onset    Cancer Mother      Heart attack Father          dies at the age of 50's    No Known Problems Sister      No Known Problems Son      Other (borderline diabetic) Maternal Grandfather      Heart attack Paternal Grandmother      Stroke Paternal Grandfather          Review of Systems:  Patient denies fevers, chills, nausea, vomiting, diarrhea, constipation, hematuria, dysuria, abdominal pain,  red blood per rectum, cough; all other review of systems is negative.    Allergies:  Azithromycin    Inpatient Medications:  Scheduled medications    Medication Dose Route Frequency    [Held by provider] enoxaparin  1 mg/kg subcutaneous q12h    nicotine  1 patch transdermal Daily     PRN medications   Medication    lubricating eye drops    melatonin    polyethylene glycol     Continuous Medications   Medication Dose Last Rate     Outpatient Medications:  No current outpatient medications    Current Imaging  CT head wo IV contrast    Result Date: 12/14/2023  Interpreted By:  Heirberto Beltran, STUDY: CT HEAD WO IV CONTRAST;  12/14/2023 8:06 am   INDICATION: Signs/Symptoms:follow-up from previous CT head d/t infarct and questionable hemorrhage.   COMPARISON: 12/14/2023 time 1:57 a.m.   ACCESSION NUMBER(S): [id number]   ORDERING CLINICIAN: CRYSTAL WARE   TECHNIQUE: Axial CT images of the head were obtained without intravenous contrast administration.   FINDINGS: There is again evidence of abnormal hypodensity which appears to involve cortex and underlying white matter along the inferior left occipital lobe extending anteriorly into the posteroinferior left temporal lobe. There is associated mass effect with asymmetric effacement of surrounding sulci. The findings remain suspicious for an area of acute to early subacute infarction. There is again evidence of a similar small amount of hyperdensity associated with the area of abnormal hypodensity raising the possibility of a minimal amount of associated petechial hemorrhagic transformation.   An area of encephalomalacia/gliosis is again noted within the right parietal lobe.   Nonspecific white matter changes are again noted within cerebral hemispheres bilaterally which while nonspecific, given the patient's age, likely represent sequelae of small-vessel ischemic change.   The ventricular system remains nondilated.   There is no midline shift.   The visualized paranasal sinuses and mastoid air cells are clear.   No acute fracture is noted.       There is again evidence of abnormal hypodensity which appears to involve  cortex and underlying white matter along the inferior left occipital lobe extending anteriorly into the posteroinferior left temporal lobe. There is associated mass effect with asymmetric effacement of surrounding sulci. The findings remain suspicious for an area of acute to early subacute infarction. There is again evidence of a similar small amount of hyperdensity associated with the area of abnormal hypodensity raising the possibility of a minimal amount of associated petechial hemorrhagic transformation.   An area of encephalomalacia/gliosis is again noted within the right parietal lobe.   Nonspecific white matter changes are again noted within cerebral hemispheres bilaterally which while nonspecific, given the patient's age, likely represent sequelae of small-vessel ischemic change.   The ventricular system remains nondilated.   MACRO: None.   Signed by: Heriberto Beltran 12/14/2023 8:20 AM Dictation workstation:   [id number]    CT head wo IV contrast    Result Date: 12/14/2023  Interpreted By:  Zeus York, STUDY: CT HEAD WO IV CONTRAST;  12/14/2023 2:00 am   INDICATION: Signs/Symptoms:headache, visual disturbances.   COMPARISON: None   ACCESSION NUMBER(S): [id number]   ORDERING CLINICIAN: CRYSTAL WARE   TECHNIQUE: Contiguous axial images of the head were obtained without intravenous contrast.   FINDINGS: There is edema in the left occipital and temporal lobe compatible with acute infarct. Minimal hyperdensity at site of infarct may relate to cortical laminar necrosis or minimal petechial hemorrhage. There is mild encephalomalacia in right parietal lobe compatible with old infarct. No hydrocephalus. No evidence of acute intracranial hemorrhage. The paranasal sinuses are clear and well pneumatized. No evidence of depressed calvarial fracture.       Edema in the left occipital and temporal lobe compatible with acute infarct. Minimal hyperdensity at site of infarct may relate to laminar necrosis or minimal  petechial hemorrhage.   MACRO: Zeus York discussed the significance and urgency of this critical finding by telephone with  JOSH WARE on 12/14/2023 at 2:55 am. (**-RCF-**) Findings:  See findings.   Signed by: Zeus York 12/14/2023 2:55 AM Dictation workstation:   [id number]    CT angio chest for pulmonary embolism    Result Date: 12/13/2023  Interpreted By:  Mamadou Daigle, STUDY: CT ANGIO CHEST FOR PULMONARY EMBOLISM;  12/13/2023 7:13 pm   INDICATION: Signs/Symptoms:New onset atrial fibrillation, history of prior DVT.   COMPARISON: None   ACCESSION NUMBER(S): [id number]   ORDERING CLINICIAN: ASHLEY OSWALD   TECHNIQUE: Helical data acquisition of the chest was obtained after intravenous administration of intravenous contrast, as per PE protocol. Images were reformatted in coronal and sagittal planes. Axial and coronal maximum intensity projection (MIP) images were created and reviewed.   FINDINGS: POTENTIAL LIMITATIONS OF THE STUDY: Motion degradation   HEART AND VESSELS: There are no discrete filling defects within main pulmonary artery and its branches to suggest acute pulmonary embolism. Enlarged main pulmonary artery compatible with pulmonary arterial hypertension   The thoracic aorta normal in course and caliber. No coronary artery calcifications are seen. Please note, the study is not optimized for evaluation of coronary arteries. Heart size is mildly enlarged   MEDIASTINUM AND STANTON, LOWER NECK AND AXILLA: The visualized thyroid gland is within normal limits. No evidence of thoracic lymphadenopathy by CT criteria. Esophagus appears within normal limits as seen.   LUNGS AND AIRWAYS: The trachea and central airways are patent. No endobronchial lesion is seen.   The bilateral lungs are clear without evidence of focal consolidation, pleural effusion, or pneumothorax.     Demineralization. Slight sliding hiatal hernia. Fatty infiltration liver. Unremarkable adrenal glands small posterior left  diaphragmatic hernia containing fat       1. No evidence of acute pulmonary embolism. Mild motion degradation. Heart size is mildly enlarged. Mild bronchial thickening. No focal infiltrate     MACRO: None   Signed by: Mamadou Daigle 12/13/2023 7:44 PM Dictation workstation:   [id number]    XR chest 1 view    Result Date: 12/13/2023  Interpreted By:  Laura Gonzalez, STUDY: XR CHEST 1 VIEW;  12/13/2023 4:01 pm   INDICATION: Signs/Symptoms:Chest Pain.   COMPARISON: None.   ACCESSION NUMBER(S): [id number]   ORDERING CLINICIAN: BRADEN PIERCE   FINDINGS: CARDIOMEDIASTINAL SILHOUETTE: Cardiomediastinal silhouette is normal in size and configuration.     LUNGS: Lungs are clear.   ABDOMEN: No remarkable upper abdominal findings.     BONES: No acute osseous changes.       No acute cardiopulmonary process.   MACRO: None   Signed by: Laura Gonzalez 12/13/2023 4:12 PM Dictation workstation:   [id number]       Physical Exam:  GENERAL:  pleasant 58 year-old  HEENT: No xanthelasma  NECK: Supple, no palpable adenopathy or thyromegaly  CHEST: Clear to auscultation, respiratory effort unlabored  CARDIAC: Irregularly irregular, normal S1 and S2, no audible murmur, rub, gallop, carotids are brisk, PMI is not displaced  ABD: Active bowel sounds, nontender, no organomegaly, no evidence of ascites  EXT: No clubbing, cyanosis, edema, or tenderness  NEURO: Awake, alert, appropriate, speech is fluent    I have reviewed the patient's telemetry.    Assessment/Plan   1.  Atrial fibrillation of uncertain duration, controlled ventricular response  2.  Stroke likely cardioembolic in the context of problem #1.  3.  Essential hypertension  4.  History of alcohol abuse  5.  Suspected sleep apnea with nonrestorative sleep and irregular sleep patterns    Plan:  1.  Anticoagulation when neurologically acceptable.  2.  Will not opt for a rhythm control strategy at present.  3.  Would not pursue GOOD since a good-quality transthoracic echo  discloses an ejection fraction of 60 to 65% with no evidence of valvular pathology, intracardiac thrombus, or intracardiac shunt on good-quality agitated saline contrast study.  4.  Recommend outpatient evaluation for sleep apnea  5.  I advised the patient that her consumption pattern of alcohol could have contributed to her arrhythmia and that she should avoid alcohol.    Code Status:  Full Code      Toribio Lomeli MD

## 2024-12-05 ENCOUNTER — APPOINTMENT (OUTPATIENT)
Dept: PRIMARY CARE | Facility: CLINIC | Age: 59
End: 2024-12-05
Payer: COMMERCIAL

## 2024-12-05 VITALS
SYSTOLIC BLOOD PRESSURE: 141 MMHG | HEIGHT: 69 IN | TEMPERATURE: 97 F | HEART RATE: 83 BPM | WEIGHT: 149 LBS | BODY MASS INDEX: 22.07 KG/M2 | DIASTOLIC BLOOD PRESSURE: 74 MMHG

## 2024-12-05 DIAGNOSIS — F32.A ANXIETY AND DEPRESSION: Primary | ICD-10-CM

## 2024-12-05 DIAGNOSIS — Z23 ENCOUNTER FOR IMMUNIZATION: ICD-10-CM

## 2024-12-05 DIAGNOSIS — F41.9 ANXIETY AND DEPRESSION: Primary | ICD-10-CM

## 2024-12-05 DIAGNOSIS — F17.210 CIGARETTE NICOTINE DEPENDENCE WITHOUT COMPLICATION: ICD-10-CM

## 2024-12-05 DIAGNOSIS — I10 PRIMARY HYPERTENSION: ICD-10-CM

## 2024-12-05 DIAGNOSIS — Z12.4 CERVICAL CANCER SCREENING: ICD-10-CM

## 2024-12-05 DIAGNOSIS — G43.109 MIGRAINE WITH AURA AND WITHOUT STATUS MIGRAINOSUS, NOT INTRACTABLE: ICD-10-CM

## 2024-12-05 DIAGNOSIS — I48.0 PAROXYSMAL ATRIAL FIBRILLATION (MULTI): ICD-10-CM

## 2024-12-05 PROCEDURE — 99213 OFFICE O/P EST LOW 20 MIN: CPT | Performed by: INTERNAL MEDICINE

## 2024-12-05 PROCEDURE — 3078F DIAST BP <80 MM HG: CPT | Performed by: INTERNAL MEDICINE

## 2024-12-05 PROCEDURE — 3077F SYST BP >= 140 MM HG: CPT | Performed by: INTERNAL MEDICINE

## 2024-12-05 PROCEDURE — 90750 HZV VACC RECOMBINANT IM: CPT | Performed by: INTERNAL MEDICINE

## 2024-12-05 PROCEDURE — 3008F BODY MASS INDEX DOCD: CPT | Performed by: INTERNAL MEDICINE

## 2024-12-05 PROCEDURE — 4004F PT TOBACCO SCREEN RCVD TLK: CPT | Performed by: INTERNAL MEDICINE

## 2024-12-05 PROCEDURE — 90471 IMMUNIZATION ADMIN: CPT | Performed by: INTERNAL MEDICINE

## 2024-12-05 RX ORDER — BUPROPION HYDROCHLORIDE 150 MG/1
150 TABLET, EXTENDED RELEASE ORAL 2 TIMES DAILY
Qty: 180 TABLET | Refills: 1 | Status: SHIPPED | OUTPATIENT
Start: 2024-12-05 | End: 2025-06-03

## 2024-12-05 ASSESSMENT — ENCOUNTER SYMPTOMS
SHORTNESS OF BREATH: 0
DYSURIA: 0
DIZZINESS: 0
DIARRHEA: 0
VOMITING: 0
CONSTIPATION: 0
NERVOUS/ANXIOUS: 0
COUGH: 0
FEVER: 0
CONFUSION: 0
HEADACHES: 1
ABDOMINAL PAIN: 0
BLOOD IN STOOL: 0
LIGHT-HEADEDNESS: 0
SORE THROAT: 0
AGITATION: 0
CHILLS: 0
PALPITATIONS: 0
NAUSEA: 0

## 2024-12-05 ASSESSMENT — PATIENT HEALTH QUESTIONNAIRE - PHQ9
2. FEELING DOWN, DEPRESSED OR HOPELESS: NOT AT ALL
SUM OF ALL RESPONSES TO PHQ9 QUESTIONS 1 AND 2: 0
1. LITTLE INTEREST OR PLEASURE IN DOING THINGS: NOT AT ALL

## 2024-12-05 NOTE — PROGRESS NOTES
Subjective   Patient ID: Tomasa Gutierres is a 59 y.o. female who presents for Follow-up regarding smoking cessation, anxiety/depression, migraines.    HPI  Patient's headaches are mainly triggered by oranges and certain perfumes. Ubrelvy helps with the headaches but does not take visual aura or photophobia that occurs. Patient has a history of migraines since patient was a teenager.  She is not interested in further treatment for migraines at this time.  Last cardiology note was reviewed.  Blood pressure mildly elevated in the office today.  She is on spironolactone daily.  Also on Eliquis for history of atrial fibrillation status post cardioversion.  She is on Lipitor for management of hyperlipidemia by cardiology as well and we reviewed her labs today in the office that showed well-controlled cholesterol levels.  Normal CBC and CMP.  She is due for shingles vaccine which will be given today.  She defers the pneumonia and flu vaccine.  She is also due for a Pap smear was given referral to OB/GYN for this.  She is also due for colonoscopy and mammogram both of which have been ordered this year and she is reminded to complete.  Denies any fever, chills, chest pain or shortness of breath, nausea, vomiting diarrhea, abdominal pain.  She is cutting back on smoking.  States that her anxiety and depression has been well-controlled on Wellbutrin.    Review of Systems   Constitutional:  Negative for chills and fever.   HENT:  Negative for sore throat.    Eyes:  Negative for visual disturbance.   Respiratory:  Negative for cough and shortness of breath.    Cardiovascular:  Negative for chest pain, palpitations and leg swelling.   Gastrointestinal:  Negative for abdominal pain, blood in stool, constipation, diarrhea, nausea and vomiting.   Genitourinary:  Negative for dysuria.   Skin:  Negative for rash.   Neurological:  Positive for headaches. Negative for dizziness, syncope and light-headedness.   Psychiatric/Behavioral:   "Negative for agitation, confusion and suicidal ideas. The patient is not nervous/anxious.        Objective   /74 (BP Location: Right arm)   Pulse 83   Temp 36.1 °C (97 °F)   Ht 1.753 m (5' 9\")   Wt 67.6 kg (149 lb)   BMI 22.00 kg/m²     Physical Exam  Vitals and nursing note reviewed.   Constitutional:       General: She is not in acute distress.     Appearance: Normal appearance. She is not ill-appearing, toxic-appearing or diaphoretic.   HENT:      Head: Normocephalic and atraumatic.      Mouth/Throat:      Mouth: Mucous membranes are moist.      Pharynx: Oropharynx is clear. No oropharyngeal exudate.   Eyes:      Pupils: Pupils are equal, round, and reactive to light.      Comments: Strabismus of the right eye   Cardiovascular:      Rate and Rhythm: Normal rate and regular rhythm.      Heart sounds: Normal heart sounds.   Pulmonary:      Effort: Pulmonary effort is normal. No respiratory distress.      Breath sounds: Normal breath sounds. No wheezing, rhonchi or rales.   Abdominal:      General: There is no distension.      Palpations: Abdomen is soft. There is no mass.      Tenderness: There is no abdominal tenderness.   Musculoskeletal:      Cervical back: Neck supple.      Right lower leg: No edema.      Left lower leg: No edema.   Lymphadenopathy:      Cervical: No cervical adenopathy.   Skin:     General: Skin is warm and dry.      Coloration: Skin is not jaundiced or pale.      Findings: No rash.   Neurological:      Mental Status: She is alert and oriented to person, place, and time. Mental status is at baseline.   Psychiatric:         Mood and Affect: Mood normal.         Behavior: Behavior normal.         Thought Content: Thought content normal.         Judgment: Judgment normal.         Assessment/Plan   Problem List Items Addressed This Visit             ICD-10-CM    Nicotine dependence F17.200    Relevant Medications    buPROPion SR (Wellbutrin SR) 150 mg 12 hr tablet    Anxiety and " depression - Primary F41.9, F32.A    Relevant Medications    buPROPion SR (Wellbutrin SR) 150 mg 12 hr tablet    Paroxysmal atrial fibrillation (Multi) I48.0    Migraine with aura and without status migrainosus, not intractable G43.109    Primary hypertension I10    Encounter for immunization Z23    Relevant Orders    Zoster vaccine, recombinant, adult (SHINGRIX) (Completed)    Cervical cancer screening Z12.4    Relevant Orders    Referral to Obstetrics / Gynecology     Anxiety and depression: Chronic, stable refills provided for Wellbutrin.  She is also taking Wellbutrin to help with smoking cessation.  States she is trying to cut back on smoking.    Paroxysmal atrial fibrillation on chronic anticoagulation: Chronic, stable currently on regular rhythm on exam.  She is not required medications for rate or rhythm control.  No adverse effects from anticoagulation.  She is following cardiology once yearly    Migraine with aura: Chronic, stable continue Ubrelvy as needed we discussed that she has increase in headaches would have her see one of the headache specialist or consider putting her on propranolol or Topamax.    Hypertension: Elevated blood pressure in the office today I advised her to monitor blood pressures at home as was recommended by her cardiologist during her last office visit.  She may benefit from medication such as propranolol given her migraine headaches.    Encounter for immunization: She will receive the shingles vaccine today second dose in 2 to 6 months    Cervical cancer screening she is overdue for cervical cancer screening was referred to OB/GYN also recommended to complete colonoscopy and mammogram.

## 2025-02-24 ENCOUNTER — TELEPHONE (OUTPATIENT)
Dept: PRIMARY CARE | Facility: CLINIC | Age: 60
End: 2025-02-24
Payer: COMMERCIAL

## 2025-02-24 NOTE — TELEPHONE ENCOUNTER
Patient called and LVM asking for referral for Optomitrist.  She states she is having vision problems.  LVM asking to call back to get more details.

## 2025-03-07 ENCOUNTER — TELEPHONE (OUTPATIENT)
Dept: CARDIOLOGY | Facility: CLINIC | Age: 60
End: 2025-03-07

## 2025-03-14 ENCOUNTER — TELEPHONE (OUTPATIENT)
Dept: PRIMARY CARE | Facility: CLINIC | Age: 60
End: 2025-03-14
Payer: COMMERCIAL

## 2025-03-14 NOTE — TELEPHONE ENCOUNTER
Patient is asking for a referral to see an eye doctor.  She states that her distance vision is getting worse and her near vision is getting more clear.  She said it was discussed at her last office visit.  Patient would like a call when referral is in chart.

## 2025-03-25 ENCOUNTER — APPOINTMENT (OUTPATIENT)
Dept: CARDIOLOGY | Facility: HOSPITAL | Age: 60
End: 2025-03-25
Payer: COMMERCIAL

## 2025-03-25 ENCOUNTER — APPOINTMENT (OUTPATIENT)
Dept: RADIOLOGY | Facility: HOSPITAL | Age: 60
End: 2025-03-25
Payer: COMMERCIAL

## 2025-03-25 ENCOUNTER — HOSPITAL ENCOUNTER (EMERGENCY)
Facility: HOSPITAL | Age: 60
Discharge: HOME | End: 2025-03-25
Attending: STUDENT IN AN ORGANIZED HEALTH CARE EDUCATION/TRAINING PROGRAM
Payer: COMMERCIAL

## 2025-03-25 VITALS
WEIGHT: 145 LBS | SYSTOLIC BLOOD PRESSURE: 153 MMHG | TEMPERATURE: 97.3 F | RESPIRATION RATE: 16 BRPM | BODY MASS INDEX: 21.48 KG/M2 | HEIGHT: 69 IN | HEART RATE: 82 BPM | OXYGEN SATURATION: 99 % | DIASTOLIC BLOOD PRESSURE: 97 MMHG

## 2025-03-25 DIAGNOSIS — H53.8 BLURRY VISION, BILATERAL: Primary | ICD-10-CM

## 2025-03-25 LAB
ALBUMIN SERPL BCP-MCNC: 4.2 G/DL (ref 3.4–5)
ALP SERPL-CCNC: 86 U/L (ref 33–136)
ALT SERPL W P-5'-P-CCNC: 11 U/L (ref 7–45)
ANION GAP SERPL CALC-SCNC: 12 MMOL/L (ref 10–20)
AST SERPL W P-5'-P-CCNC: 15 U/L (ref 9–39)
BASOPHILS # BLD AUTO: 0.07 X10*3/UL (ref 0–0.1)
BASOPHILS NFR BLD AUTO: 1 %
BILIRUB SERPL-MCNC: 0.8 MG/DL (ref 0–1.2)
BUN SERPL-MCNC: 8 MG/DL (ref 6–23)
CALCIUM SERPL-MCNC: 9.3 MG/DL (ref 8.6–10.3)
CARDIAC TROPONIN I PNL SERPL HS: 4 NG/L (ref 0–13)
CHLORIDE SERPL-SCNC: 105 MMOL/L (ref 98–107)
CO2 SERPL-SCNC: 27 MMOL/L (ref 21–32)
CREAT SERPL-MCNC: 0.63 MG/DL (ref 0.5–1.05)
EGFRCR SERPLBLD CKD-EPI 2021: >90 ML/MIN/1.73M*2
EOSINOPHIL # BLD AUTO: 0.09 X10*3/UL (ref 0–0.7)
EOSINOPHIL NFR BLD AUTO: 1.2 %
ERYTHROCYTE [DISTWIDTH] IN BLOOD BY AUTOMATED COUNT: 12.7 % (ref 11.5–14.5)
GLUCOSE SERPL-MCNC: 117 MG/DL (ref 74–99)
HCT VFR BLD AUTO: 47.6 % (ref 36–46)
HGB BLD-MCNC: 15.8 G/DL (ref 12–16)
HOLD SPECIMEN: NORMAL
IMM GRANULOCYTES # BLD AUTO: 0.02 X10*3/UL (ref 0–0.7)
IMM GRANULOCYTES NFR BLD AUTO: 0.3 % (ref 0–0.9)
INR PPP: 1.2 (ref 0.9–1.1)
LYMPHOCYTES # BLD AUTO: 1.88 X10*3/UL (ref 1.2–4.8)
LYMPHOCYTES NFR BLD AUTO: 25.9 %
MCH RBC QN AUTO: 31.7 PG (ref 26–34)
MCHC RBC AUTO-ENTMCNC: 33.2 G/DL (ref 32–36)
MCV RBC AUTO: 96 FL (ref 80–100)
MONOCYTES # BLD AUTO: 0.53 X10*3/UL (ref 0.1–1)
MONOCYTES NFR BLD AUTO: 7.3 %
NEUTROPHILS # BLD AUTO: 4.67 X10*3/UL (ref 1.2–7.7)
NEUTROPHILS NFR BLD AUTO: 64.3 %
NRBC BLD-RTO: 0 /100 WBCS (ref 0–0)
PLATELET # BLD AUTO: 282 X10*3/UL (ref 150–450)
POTASSIUM SERPL-SCNC: 4 MMOL/L (ref 3.5–5.3)
PROT SERPL-MCNC: 6.9 G/DL (ref 6.4–8.2)
PROTHROMBIN TIME: 12.9 SECONDS (ref 9.8–12.4)
RBC # BLD AUTO: 4.98 X10*6/UL (ref 4–5.2)
SODIUM SERPL-SCNC: 140 MMOL/L (ref 136–145)
TSH SERPL-ACNC: 1.74 MIU/L (ref 0.44–3.98)
WBC # BLD AUTO: 7.3 X10*3/UL (ref 4.4–11.3)

## 2025-03-25 PROCEDURE — 84443 ASSAY THYROID STIM HORMONE: CPT | Performed by: STUDENT IN AN ORGANIZED HEALTH CARE EDUCATION/TRAINING PROGRAM

## 2025-03-25 PROCEDURE — 36415 COLL VENOUS BLD VENIPUNCTURE: CPT

## 2025-03-25 PROCEDURE — 70450 CT HEAD/BRAIN W/O DYE: CPT

## 2025-03-25 PROCEDURE — 71045 X-RAY EXAM CHEST 1 VIEW: CPT

## 2025-03-25 PROCEDURE — 70450 CT HEAD/BRAIN W/O DYE: CPT | Performed by: RADIOLOGY

## 2025-03-25 PROCEDURE — 85610 PROTHROMBIN TIME: CPT

## 2025-03-25 PROCEDURE — 85025 COMPLETE CBC W/AUTO DIFF WBC: CPT

## 2025-03-25 PROCEDURE — 80053 COMPREHEN METABOLIC PANEL: CPT

## 2025-03-25 PROCEDURE — 84484 ASSAY OF TROPONIN QUANT: CPT

## 2025-03-25 PROCEDURE — 99285 EMERGENCY DEPT VISIT HI MDM: CPT | Mod: 25 | Performed by: STUDENT IN AN ORGANIZED HEALTH CARE EDUCATION/TRAINING PROGRAM

## 2025-03-25 PROCEDURE — 93005 ELECTROCARDIOGRAM TRACING: CPT

## 2025-03-25 ASSESSMENT — PAIN SCALES - GENERAL
PAINLEVEL_OUTOF10: 0 - NO PAIN

## 2025-03-25 ASSESSMENT — VISUAL ACUITY
OD: 20/70
OS: 20/50

## 2025-03-25 ASSESSMENT — LIFESTYLE VARIABLES
EVER FELT BAD OR GUILTY ABOUT YOUR DRINKING: NO
HAVE PEOPLE ANNOYED YOU BY CRITICIZING YOUR DRINKING: NO
HAVE YOU EVER FELT YOU SHOULD CUT DOWN ON YOUR DRINKING: NO
TOTAL SCORE: 0
EVER HAD A DRINK FIRST THING IN THE MORNING TO STEADY YOUR NERVES TO GET RID OF A HANGOVER: NO

## 2025-03-25 ASSESSMENT — PAIN - FUNCTIONAL ASSESSMENT
PAIN_FUNCTIONAL_ASSESSMENT: 0-10

## 2025-03-25 NOTE — DISCHARGE INSTRUCTIONS
[facility] ophthalmology will reach out to you for setting up appointment later this week for close follow-up regarding today's ED visit and blurriness of vision symptoms.  Please return to closest ED if develop any worsening vision issues, extremity weakness, lightheadedness, dizziness, or loss of function.

## 2025-03-25 NOTE — ED PROVIDER NOTES
"HPI   Chief Complaint   Patient presents with    Palpitations    Eye Problem     Patient is a 59-year-old female with history of A-fib on Eliquis, HTN presenting [facility] ED for intermittent blurred vision.  Patient reports that started 3 PM yesterday.  Patient denies any difficulty in walking, vision loss, extremity weakness, discoordination, chest pain, shortness of breath, nausea/vomiting, dizziness, headache, or weakness.  Patient does not localize blurriness of vision to 1 side or up/down.  Patient evaluated in triage for BAT, patient has no focal findings on exam.  NIH of 0.      Patient indicates that she has had a \"lazy eye\" in her right eye since she was young, has had multiple surgeries by Ophthalmology but has been told that her medial rectus muscle is \"too weak to move the right eye past midline\". She says that in October/November, she failed her driving vision test as vision was worse than 20/40 bilaterally. She went to an optometrist that said her eyes were fine. Has been having waxing and waning vision issues, has been trying to get back into an optometrist vs ophthalmologist for the past several weeks (documented well in the chart). Unexpectedly, while having a stressful moment yesterday, her vision worsened even more. Her vision has been stable at this worsened level since yesterday at 3 PM, reports that can normally read 20/40, but feels like its worse. Found to be 20/70 on the right, 20/50 on the left in triage. Patient denies headache, pain in eyes, no recent trauma. Is compliant with her meds.    Patient History   Past Medical History:   Diagnosis Date    Atrial fibrillation (Multi)     Closed fracture of upper end of humerus 11/03/2015    Dry eyes     DVT (deep venous thrombosis) (Multi)     tx with Coumadin    Elevated liver enzymes 12/13/2023    Intermittent exotropia of right eye     Migraines     Pre-hypertension 12/13/2023    Scoliosis     Shoulder fracture, left     Stroke (Multi)     " CVA/TIA    Stroke (Multi) 12/2023    left PCA territory infarct     Past Surgical History:   Procedure Laterality Date    ECTOPIC PREGNANCY SURGERY      EYE SURGERY Right     x2    FRACTURE SURGERY      left wrist    HERNIA REPAIR      MR HEAD ANGIO WO IV CONTRAST  12/14/2023    MR HEAD ANGIO WO IV CONTRAST 12/14/2023 [facility]    MR NECK ANGIO WO IV CONTRAST  12/14/2023    MR NECK ANGIO WO IV CONTRAST 12/14/2023 [facility]     Family History   Problem Relation Name Age of Onset    Cancer Mother      Heart attack Father          dies at the age of 50's    No Known Problems Sister      No Known Problems Son      Other (borderline diabetic) Maternal Grandfather      Heart attack Paternal Grandmother      Stroke Paternal Grandfather       Social History     Tobacco Use    Smoking status: Every Day     Current packs/day: 0.50     Average packs/day: 0.5 packs/day for 15.0 years (7.5 ttl pk-yrs)     Types: Cigarettes    Smokeless tobacco: Never    Tobacco comments:     09/09/2024:  Smoking about 4 to 5 cigarettes per day.   Vaping Use    Vaping status: Not on file   Substance Use Topics    Alcohol use: Not Currently    Drug use: Never       Physical Exam   ED Triage Vitals [03/25/25 1306]   Temperature Heart Rate Respirations BP   36.3 °C (97.3 °F) 84 16 (!) 188/92      Pulse Ox Temp Source Heart Rate Source Patient Position   99 % Temporal -- --      BP Location FiO2 (%)     -- --       Physical Exam  Constitutional:       Appearance: Normal appearance. She is normal weight.   HENT:      Head: Normocephalic and atraumatic.      Nose: Nose normal.      Mouth/Throat:      Mouth: Mucous membranes are moist.      Pharynx: Oropharynx is clear.   Eyes:      Extraocular Movements: Extraocular movements intact.      Conjunctiva/sclera: Conjunctivae normal.      Pupils: Pupils are equal, round, and reactive to light.      Comments: Diplopia of the right eye.   Cardiovascular:      Rate and Rhythm: Normal rate and regular rhythm.       Pulses: Normal pulses.      Heart sounds: Normal heart sounds.   Pulmonary:      Effort: Pulmonary effort is normal.      Breath sounds: Normal breath sounds.   Abdominal:      General: Abdomen is flat. Bowel sounds are normal.      Palpations: Abdomen is soft.   Musculoskeletal:         General: Normal range of motion.      Cervical back: Normal range of motion and neck supple.   Skin:     General: Skin is warm and dry.      Capillary Refill: Capillary refill takes less than 2 seconds.   Neurological:      General: No focal deficit present.      Mental Status: She is alert and oriented to person, place, and time. Mental status is at baseline.   Psychiatric:         Mood and Affect: Mood normal.         Behavior: Behavior normal.     Patient cannot adduct her right eye past midline, she reports this is her baseline. Normal gait, negative Romberg, no ataxia, normal coordination, normal cranial nerves other than III?, normal reflexes, normal strength, sensation      ED Course & [facility]   ED Course as of 03/25/25 1636   Tue Mar 25, 2025   1442 Consulted on-call neurology, Dr. Ruano.  He recommended obtaining ophthalmology evaluation for further workup.  He is on no further evaluation from neurology as necessary. [MI]      ED Course User Index  [MI] Matt Mathews MD         Diagnoses as of 03/25/25 1636   Blurry vision, bilateral                 No data recorded     [place] Coma Scale Score: 15 (03/25/25 1308 : Rose Mary Laird RN)                           Medical Decision Making  Patient is a 59 y.o. female who presents to [facility] ED for Palpitations and Eye Problem. On initial ED evaluation, patient found to be in no acute distress. Per HPI, concern to evaluate and treat for differential diagnosis including, but not limited to CVA, hypertensive urgency.  Obtaining CT head, hypertension lab workup and diagnostics.  CT head negative for any acute hemorrhage or abnormality.  CMP showed no significant HERBER or electrolyte  abnormality.  CMP grossly unremarkable.  Troponin negative.  Low concern for ACS.  No acute ischemic changes on EKG.  Consulted on-call neurology, Dr. Ruano recommended ophthalmology evaluation.  No acute indication for inpatient admission at this time for neurology.  Consulted on-call ophthalmology at [facility].  They are able to place close follow-up for patient.  Patient stable for discharge.      Diagnostic findings and treatment plan discussed with patient/family. They are amenable to plan. Rx given for N/A. Patient to follow up with PCP, ophthalmology outpatient,referrals provided. Anticipatory guidance and return precautions provided.  Patient otherwise stable for discharge.      We did offer patient [place] [facility] [place] consultation. We discussed that her pupil appears normal, she has no pain with the vision issue, no floaters, the issue is in bilateral eyes, not concerned clinically for acute angle glaucoma, traumatic iritis, uveitis, conjunctivitis, unlikely to be a RD, PVD, or VH based on patient complaint. CRAO unlikely, this in general does not constitute an amaurosis fugax.    Patient does not want transfer, will follow up outpatient. Appreciate Opt for providing her with close outpatient follow up.    Procedure  Procedures     Matt Mathews MD  Resident  03/25/25 7307       Jason Charles MD  03/26/25 9628

## 2025-03-25 NOTE — ED TRIAGE NOTES
Patient states that she was in a stressful situation yesterday and began feeling palpitations in chest and blurry vision at 1500.  The blurry vision continues today but not feeling palpitations at this time.

## 2025-03-26 ENCOUNTER — TELEPHONE (OUTPATIENT)
Dept: OPHTHALMOLOGY | Facility: CLINIC | Age: 60
End: 2025-03-26
Payer: COMMERCIAL

## 2025-03-26 LAB
ATRIAL RATE: 87 BPM
Q ONSET: 225 MS
QRS COUNT: 12 BEATS
QRS DURATION: 96 MS
QT INTERVAL: 408 MS
QTC CALCULATION(BAZETT): 446 MS
QTC FREDERICIA: 433 MS
R AXIS: 69 DEGREES
T AXIS: 55 DEGREES
T OFFSET: 429 MS
VENTRICULAR RATE: 72 BPM

## 2025-03-26 NOTE — TELEPHONE ENCOUNTER
Received email from Dr. Cordoba regarding getting patient scheduled in triage clinic for bilateral blurry vision. LVM at 11:52am.

## 2025-03-27 ENCOUNTER — OFFICE VISIT (OUTPATIENT)
Dept: OPHTHALMOLOGY | Facility: CLINIC | Age: 60
End: 2025-03-27
Payer: COMMERCIAL

## 2025-03-27 DIAGNOSIS — H25.813 COMBINED FORMS OF AGE-RELATED CATARACT OF BOTH EYES: Primary | ICD-10-CM

## 2025-03-27 PROCEDURE — 99203 OFFICE O/P NEW LOW 30 MIN: CPT | Performed by: OPHTHALMOLOGY

## 2025-03-27 ASSESSMENT — CONF VISUAL FIELD
OD_SUPERIOR_NASAL_RESTRICTION: 0
OD_INFERIOR_TEMPORAL_RESTRICTION: 0
OS_NORMAL: 1
OS_INFERIOR_TEMPORAL_RESTRICTION: 0
OS_SUPERIOR_NASAL_RESTRICTION: 0
OD_SUPERIOR_TEMPORAL_RESTRICTION: 0
OS_SUPERIOR_TEMPORAL_RESTRICTION: 0
OD_INFERIOR_NASAL_RESTRICTION: 0
OD_NORMAL: 1
OS_INFERIOR_NASAL_RESTRICTION: 0
METHOD: COUNTING FINGERS

## 2025-03-27 ASSESSMENT — REFRACTION_MANIFEST
OS_ADD: +2.25
OD_CYLINDER: -0.75
OS_SPHERE: -1.75
OD_SPHERE: +2.50
OD_ADD: +2.25
OS_CYLINDER: -1.50
OD_AXIS: 170
OS_AXIS: 170

## 2025-03-27 ASSESSMENT — EXTERNAL EXAM - RIGHT EYE: OD_EXAM: NORMAL

## 2025-03-27 ASSESSMENT — TONOMETRY
OD_IOP_MMHG: 18
IOP_METHOD: GOLDMANN APPLANATION
OS_IOP_MMHG: 18

## 2025-03-27 ASSESSMENT — VISUAL ACUITY
METHOD: SNELLEN - LINEAR
OD_SC: 20/60
OS_SC: 20/30

## 2025-03-27 ASSESSMENT — EXTERNAL EXAM - LEFT EYE: OS_EXAM: NORMAL

## 2025-03-27 ASSESSMENT — CUP TO DISC RATIO
OD_RATIO: 0.3
OS_RATIO: 0.3

## 2025-03-27 ASSESSMENT — ENCOUNTER SYMPTOMS
ALLERGIC/IMMUNOLOGIC NEGATIVE: 0
MUSCULOSKELETAL NEGATIVE: 0
HEMATOLOGIC/LYMPHATIC NEGATIVE: 0
NEUROLOGICAL NEGATIVE: 0
RESPIRATORY NEGATIVE: 0
GASTROINTESTINAL NEGATIVE: 0
EYES NEGATIVE: 0
ENDOCRINE NEGATIVE: 0
PSYCHIATRIC NEGATIVE: 0
CARDIOVASCULAR NEGATIVE: 0
CONSTITUTIONAL NEGATIVE: 0

## 2025-03-27 ASSESSMENT — SLIT LAMP EXAM - LIDS
COMMENTS: NORMAL
COMMENTS: NORMAL

## 2025-03-27 NOTE — PROGRESS NOTES
Assessment/Plan   Diagnoses and all orders for this visit:  Combined forms of age-related cataract of both eyes  Patient had 2 eye muscle surgeries at the age of 3 and 13 for exotropia.     Now still with large angle exotropia with Retricted adduction in the right eye  Right eye Amblyopia  Patient is interested in Cataract surgery    Plan:  Use ATs TID sample given  See in 1-2 weeks for BAT, and calcs/testing for Cataract surgery, no need to dilate.

## 2025-03-30 DIAGNOSIS — H53.9 CHANGES IN VISION: ICD-10-CM

## 2025-03-30 DIAGNOSIS — H53.9 VISUAL DISTURBANCE: Primary | ICD-10-CM

## 2025-04-03 ENCOUNTER — APPOINTMENT (OUTPATIENT)
Dept: OPHTHALMOLOGY | Facility: CLINIC | Age: 60
End: 2025-04-03
Payer: COMMERCIAL

## 2025-04-03 ENCOUNTER — HOSPITAL ENCOUNTER (OUTPATIENT)
Facility: HOSPITAL | Age: 60
Setting detail: OUTPATIENT SURGERY
End: 2025-04-03
Attending: OPHTHALMOLOGY | Admitting: OPHTHALMOLOGY
Payer: COMMERCIAL

## 2025-04-03 DIAGNOSIS — H25.813 COMBINED FORMS OF AGE-RELATED CATARACT OF BOTH EYES: Primary | ICD-10-CM

## 2025-04-03 DIAGNOSIS — H25.11 AGE-RELATED NUCLEAR CATARACT OF RIGHT EYE: ICD-10-CM

## 2025-04-03 PROCEDURE — 99213 OFFICE O/P EST LOW 20 MIN: CPT | Performed by: OPHTHALMOLOGY

## 2025-04-03 RX ORDER — TETRACAINE HYDROCHLORIDE 5 MG/ML
1 SOLUTION OPHTHALMIC ONCE
OUTPATIENT
Start: 2025-04-03 | End: 2025-04-03

## 2025-04-03 RX ORDER — TROPICAMIDE 10 MG/ML
1 SOLUTION/ DROPS OPHTHALMIC
OUTPATIENT
Start: 2025-04-03 | End: 2025-04-03

## 2025-04-03 RX ORDER — CYCLOPENTOLATE HYDROCHLORIDE 10 MG/ML
1 SOLUTION/ DROPS OPHTHALMIC
OUTPATIENT
Start: 2025-04-03 | End: 2025-04-03

## 2025-04-03 RX ORDER — PHENYLEPHRINE HYDROCHLORIDE 25 MG/ML
1 SOLUTION/ DROPS OPHTHALMIC
OUTPATIENT
Start: 2025-04-03 | End: 2025-04-03

## 2025-04-03 ASSESSMENT — CONF VISUAL FIELD
OD_SUPERIOR_TEMPORAL_RESTRICTION: 0
METHOD: COUNTING FINGERS
OS_INFERIOR_TEMPORAL_RESTRICTION: 0
OS_SUPERIOR_NASAL_RESTRICTION: 0
OD_INFERIOR_NASAL_RESTRICTION: 0
OD_SUPERIOR_NASAL_RESTRICTION: 0
OS_NORMAL: 1
OS_INFERIOR_NASAL_RESTRICTION: 0
OD_INFERIOR_TEMPORAL_RESTRICTION: 0
OS_SUPERIOR_TEMPORAL_RESTRICTION: 0
OD_NORMAL: 1

## 2025-04-03 ASSESSMENT — REFRACTION_WEARINGRX
OD_ADD: +2.25
OS_CYLINDER: -1.50
OD_AXIS: 170
OD_SPHERE: +2.50
OS_SPHERE: -1.75
OS_ADD: +2.25
OD_CYLINDER: -0.75
OS_AXIS: 170

## 2025-04-03 ASSESSMENT — EXTERNAL EXAM - RIGHT EYE: OD_EXAM: NORMAL

## 2025-04-03 ASSESSMENT — REFRACTION_MANIFEST
OD_ADD: +2.25
OS_SPHERE: -1.75
OD_SPHERE: +3.00
OS_AXIS: 105
OD_AXIS: 170
OS_ADD: +2.25
OD_CYLINDER: -0.75
OS_CYLINDER: -1.25

## 2025-04-03 ASSESSMENT — ENCOUNTER SYMPTOMS
EYES NEGATIVE: 1
ALLERGIC/IMMUNOLOGIC NEGATIVE: 0
CARDIOVASCULAR NEGATIVE: 0
ENDOCRINE NEGATIVE: 0
RESPIRATORY NEGATIVE: 0
GASTROINTESTINAL NEGATIVE: 0
NEUROLOGICAL NEGATIVE: 0
HEMATOLOGIC/LYMPHATIC NEGATIVE: 0
CONSTITUTIONAL NEGATIVE: 0
MUSCULOSKELETAL NEGATIVE: 0
PSYCHIATRIC NEGATIVE: 0

## 2025-04-03 ASSESSMENT — EXTERNAL EXAM - LEFT EYE: OS_EXAM: NORMAL

## 2025-04-03 ASSESSMENT — VISUAL ACUITY
OS_BAT_HIGH: 20/25
OS_SC+: +2
METHOD: SNELLEN - LINEAR
OS_SC: 20/60
OD_BAT_HIGH: 20/25
OD_SC: 20/60

## 2025-04-03 ASSESSMENT — CUP TO DISC RATIO
OD_RATIO: 0.3
OS_RATIO: 0.3

## 2025-04-03 ASSESSMENT — SLIT LAMP EXAM - LIDS
COMMENTS: NORMAL
COMMENTS: NORMAL

## 2025-04-06 NOTE — PROGRESS NOTES
Assessment/Plan   Diagnoses and all orders for this visit:  Combined forms of age-related cataract of both eyes  Patient had 2 eye muscle surgeries at the age of 3 and 13 for exotropia.     Now still with large angle exotropia with Retricted adduction in the right eye  Right eye Amblyopia  Patient is interested in Cataract surgery    Symptoms: blurry vision, glare. A change in glasses prescription will not result in significant visual improvement at this time.  Indication for cataract surgery: Input To potentially improve visual acuity and improve quality of life/reduce symptoms.   Based on a comprehensive eye exam performed today, a visually significant cataract appears to be the source of decreased vision, diminished quality of life, and impairment of activities of daily living. Discussed option of cataract surgery vs observation. Patient can no longer function adequately with current best corrected visual acuity and wishes to have cataract surgery at this time. Discussed surgical procedure with patient. As a result of cataract extraction, it is believed that the patient will experience improved vision. Discussed potential risks, benefits, and complications of cataract surgery including but not limited to pain, bleeding, infection, inflammation, edema, increased eye pressure, retinal tear/detachment, lens dislocation, ptosis, iris damage, need for additional surgery, need for glasses after surgery, loss of vision/loss of eye. Patient understands and wishes to proceed. All questions were answered. Will schedule cataract surgery OD the OS. Lenstar done today.  Discussed IOL options (standard monofocal, monofocal with monovision, toric, multifocal). Lens chosen: standard monofocal. Defer/decline toric/multifocal lens at this time. Had thorough discussion with patient re: aim. Discussed that may potentially need glasses for best vision both at distance and at near.    Schedule cataract surgery OD then OS  I personally  reviewed the lenstar measurements and will choose the lens accordingly.

## 2025-05-13 DIAGNOSIS — I48.91 NEW ONSET ATRIAL FIBRILLATION (MULTI): ICD-10-CM

## 2025-06-01 DIAGNOSIS — I48.19 PERSISTENT ATRIAL FIBRILLATION (MULTI): ICD-10-CM

## 2025-06-01 DIAGNOSIS — E78.5 DYSLIPIDEMIA: Primary | ICD-10-CM

## 2025-06-02 RX ORDER — ATORVASTATIN CALCIUM 40 MG/1
40 TABLET, FILM COATED ORAL DAILY
Qty: 90 TABLET | Refills: 3 | Status: SHIPPED | OUTPATIENT
Start: 2025-06-02

## 2025-06-05 ENCOUNTER — APPOINTMENT (OUTPATIENT)
Dept: PRIMARY CARE | Facility: CLINIC | Age: 60
End: 2025-06-05
Payer: COMMERCIAL

## 2025-06-05 VITALS
HEIGHT: 67 IN | WEIGHT: 148 LBS | TEMPERATURE: 97 F | BODY MASS INDEX: 23.23 KG/M2 | HEART RATE: 88 BPM | SYSTOLIC BLOOD PRESSURE: 143 MMHG | DIASTOLIC BLOOD PRESSURE: 75 MMHG

## 2025-06-05 DIAGNOSIS — H25.813 COMBINED FORMS OF AGE-RELATED CATARACT OF BOTH EYES: ICD-10-CM

## 2025-06-05 DIAGNOSIS — E78.5 DYSLIPIDEMIA: ICD-10-CM

## 2025-06-05 DIAGNOSIS — F41.9 ANXIETY AND DEPRESSION: ICD-10-CM

## 2025-06-05 DIAGNOSIS — Z12.31 ENCOUNTER FOR SCREENING MAMMOGRAM FOR BREAST CANCER: ICD-10-CM

## 2025-06-05 DIAGNOSIS — I48.0 PAROXYSMAL ATRIAL FIBRILLATION (MULTI): ICD-10-CM

## 2025-06-05 DIAGNOSIS — Z00.00 HEALTH MAINTENANCE EXAMINATION: Primary | ICD-10-CM

## 2025-06-05 DIAGNOSIS — Z12.4 CERVICAL CANCER SCREENING: ICD-10-CM

## 2025-06-05 DIAGNOSIS — F32.A ANXIETY AND DEPRESSION: ICD-10-CM

## 2025-06-05 DIAGNOSIS — Z86.73 HISTORY OF LEFT PCA STROKE: ICD-10-CM

## 2025-06-05 DIAGNOSIS — Z23 IMMUNIZATION DUE: ICD-10-CM

## 2025-06-05 DIAGNOSIS — I10 PRIMARY HYPERTENSION: ICD-10-CM

## 2025-06-05 DIAGNOSIS — Z12.11 COLON CANCER SCREENING: ICD-10-CM

## 2025-06-05 DIAGNOSIS — Z13.29 THYROID DISORDER SCREENING: ICD-10-CM

## 2025-06-05 PROCEDURE — 99396 PREV VISIT EST AGE 40-64: CPT | Performed by: INTERNAL MEDICINE

## 2025-06-05 PROCEDURE — 90471 IMMUNIZATION ADMIN: CPT | Performed by: INTERNAL MEDICINE

## 2025-06-05 PROCEDURE — 3008F BODY MASS INDEX DOCD: CPT | Performed by: INTERNAL MEDICINE

## 2025-06-05 PROCEDURE — 3078F DIAST BP <80 MM HG: CPT | Performed by: INTERNAL MEDICINE

## 2025-06-05 PROCEDURE — 90750 HZV VACC RECOMBINANT IM: CPT | Performed by: INTERNAL MEDICINE

## 2025-06-05 PROCEDURE — 90715 TDAP VACCINE 7 YRS/> IM: CPT | Performed by: INTERNAL MEDICINE

## 2025-06-05 PROCEDURE — 3077F SYST BP >= 140 MM HG: CPT | Performed by: INTERNAL MEDICINE

## 2025-06-05 PROCEDURE — 90472 IMMUNIZATION ADMIN EACH ADD: CPT | Performed by: INTERNAL MEDICINE

## 2025-06-05 ASSESSMENT — ENCOUNTER SYMPTOMS
DIZZINESS: 0
VOMITING: 0
DYSURIA: 0
PALPITATIONS: 0
NAUSEA: 0
FEVER: 0
SHORTNESS OF BREATH: 0
ABDOMINAL PAIN: 0
COUGH: 0
CHILLS: 0
LIGHT-HEADEDNESS: 0
SORE THROAT: 0
AGITATION: 0
DIARRHEA: 0
BLOOD IN STOOL: 0
CONFUSION: 0
HEMATURIA: 0

## 2025-06-05 NOTE — PROGRESS NOTES
Subjective   Tomasa Gutierres is a 59 y.o. female and is here for a comprehensive physical exam. The patient reports no problems.    Do you take any herbs or supplements that were not prescribed by a doctor? no  Are you taking calcium supplements? no  Are you taking aspirin daily? yes     History of Present Illness  Tomasa Gutierres is a 59 year old female who presents for an annual wellness visit and to receive her second shingles vaccine.    Patient here also to request tetanus vaccine.  She did have an injury in her medical chart of having a tetanus vaccine on 4/3/2024 but states this is an accurate and that she wants a tetanus vaccine given to her today.  States that her sons girlfriend recently had a baby and she wants to be protected with a tdap vaccine.  We reviewed her current tetanus shot record that was in the chart and there was no documentation of where it was given or by whom.      She has cataracts and is scheduled for eye surgery on Wednesday. Her vision has deteriorated rapidly, which she attributes to the cataracts. 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    She experiences migraines, described as 'lightning bolts' in her eyes, often triggered by a coworker's perfume. She manages them with Tylenol and by moving away from the trigger when possible. She previously tried Ubrelvy, which was ineffective for her migraines but helped with regular headaches.    She has a history of chest pain and was evaluated in the ER, where her heart was checked, and she was assessed for a stroke. She was under significant stress at the time, which coincided with her vision issues.    She is on atorvastatin for cholesterol management and takes Eliquis for anticoagulation. She used to take aspirin but is no longer on it. She is also on Wellbutrin, which she is using to help quit smoking.    She lives in [place] and works on a computer for ten hours a day. She is  trying to quit smoking and is using Wellbutrin to assist with this.    No abdominal pain or leg swelling. No family history of colon cancer and has never had a colonoscopy.    Patient up-to-date on age and gender recommend screening with the exception of cervical cancer screening, mammogram and colon cancer screening all of which have been ordered.  She is up-to-date on age and gender recommended vaccinations exception of tetanus vaccine her second shingles vaccine will be given today also due for pneumonia vaccine which we will plan on for our next visit.  Do you have pain that bothers you in your daily life? no  Review of Systems   Constitutional:  Negative for chills and fever.   HENT:  Negative for sore throat.    Eyes:  Positive for visual disturbance.   Respiratory:  Negative for cough and shortness of breath.    Cardiovascular:  Negative for chest pain, palpitations and leg swelling.   Gastrointestinal:  Negative for abdominal pain, blood in stool, diarrhea, nausea and vomiting.   Genitourinary:  Negative for dysuria and hematuria.   Skin:  Negative for rash.   Neurological:  Negative for dizziness, syncope and light-headedness.   Psychiatric/Behavioral:  Negative for agitation and confusion.         Objective   Physical Exam  Vitals and nursing note reviewed.   Constitutional:       General: She is not in acute distress.     Appearance: Normal appearance. She is normal weight. She is not ill-appearing, toxic-appearing or diaphoretic.   HENT:      Head: Normocephalic and atraumatic.      Nose: No rhinorrhea.      Mouth/Throat:      Mouth: Mucous membranes are moist.      Pharynx: Oropharynx is clear.   Eyes:      Pupils: Pupils are equal, round, and reactive to light.   Cardiovascular:      Rate and Rhythm: Normal rate and regular rhythm.      Heart sounds: Normal heart sounds. No murmur heard.  Pulmonary:      Effort: Pulmonary effort is normal. No respiratory distress.      Breath sounds: Normal breath  sounds. No wheezing, rhonchi or rales.   Abdominal:      General: There is no distension.      Palpations: Abdomen is soft. There is no mass.      Tenderness: There is no abdominal tenderness. There is no guarding.   Musculoskeletal:      Cervical back: Neck supple.      Right lower leg: No edema.      Left lower leg: No edema.   Skin:     General: Skin is warm and dry.      Coloration: Skin is not pale.      Findings: No rash.   Neurological:      General: No focal deficit present.      Mental Status: She is alert and oriented to person, place, and time. Mental status is at baseline.   Psychiatric:         Mood and Affect: Mood normal.         Behavior: Behavior normal.         Thought Content: Thought content normal.         Judgment: Judgment normal.       Physical Exam  ABDOMEN: Non-tender on palpation.  EXTREMITIES: No swelling.  Assessment/Plan   Assessment & Plan  Migraine  Experiences migraines described as 'lightning bolts' from her eyes. Previously on Ubrelvy, which was ineffective for migraines but helped with regular headaches. Currently uses Tylenol, which has been somewhat effective. Migraines are often triggered by a coworker's perfume, but she has been able to avoid this trigger most of the time.  - Continue Tylenol for migraine management  - Avoid known triggers such as perfume  - Consider alternative migraine medication if symptoms worsen    Cataracts  Scheduled for cataract surgery due to significant vision changes. Surgery is prioritized due to work requirements involving prolonged computer use. Informed that cataract surgery generally has a low risk of complications, with a 1% chance of issues. Expected to recover quickly.  - Proceed with scheduled cataract surgery    Nicotine dependence  Currently smoking and is on Bupropion (Wellbutrin) to aid in smoking cessation. She is attempting to quit smoking, but acknowledges the difficulty of the process.  - Continue Bupropion (Wellbutrin) 150 mg oral  12 hr tablet  - Encourage smoking cessation efforts    Paroxysmal atrial fibrillation patient currently on Eliquis and has not required rate control    Hypertension: Chronic, stable continue spironolactone    Hyperlipidemia: Chronic, stable she is on Lipitor prescribed by cardiology    Anxiety and depression: Chronic, stable continue Wellbutrin    General Health Maintenance  Due for several vaccinations and screenings. Due for her second shingles vaccine and a tetanus shot. Due for a pneumonia vaccine, which is now recommended at age 50. Has not had a mammogram or colonoscopy and has no family history of colon cancer. Has not had cervical cancer screening and has not seen an OB GYN.  - Administer second shingles vaccine  - Administer tetanus vaccine  - Consider pneumonia vaccine in the fall  - Order mammogram  - Order colonoscopy at [facility]  - Refer to OB GYN for cervical cancer screening  Healthy female exam.      1. Patient up-to-date on age and gender recommend screening with the exception of cervical cancer screening, mammogram and colon cancer screening all of which have been ordered.  She is up-to-date on age and gender recommended vaccinations exception of tetanus vaccine her second shingles vaccine will be given today also due for pneumonia vaccine which we will plan on for our next visit.  2. Patient Counseling:  --Nutrition: Stressed importance of moderation in sodium/caffeine intake, saturated fat and cholesterol, caloric balance, sufficient intake of fresh fruits, vegetables, fiber, calcium, iron, and 1 mg of folate supplement per day (for females capable of pregnancy).  --Discussed the issue of estrogen replacement, calcium supplement, and the daily use of baby aspirin.  --Exercise: Stressed the importance of regular exercise.   --Substance Abuse: Discussed cessation/primary prevention of tobacco, alcohol, or other drug use; driving or other dangerous activities under the influence;  availability of treatment for abuse.    --Sexuality: Discussed sexually transmitted diseases, partner selection, use of condoms, avoidance of unintended pregnancy  and contraceptive alternatives.   --Injury prevention: Discussed safety belts, safety helmets, smoke detector, smoking near bedding or upholstery.   --Dental health: Discussed importance of regular tooth brushing, flossing, and dental visits.  --Immunizations reviewed.  --Discussed benefits of screening colonoscopy.  --After hours service discussed with patient  3. Discussed the patient's BMI with her.  The BMI is in the acceptable range.  4. Follow up in 6 months    Luis Fernando Morrison DO     This medical note was created with the assistance of artificial intelligence (AI) for documentation purposes. The content has been reviewed and confirmed by the healthcare provider for accuracy and completeness. Patient consented to the use of audio recording and use of AI during their visit.

## 2025-06-06 DIAGNOSIS — F41.9 ANXIETY AND DEPRESSION: ICD-10-CM

## 2025-06-06 DIAGNOSIS — F17.210 CIGARETTE NICOTINE DEPENDENCE WITHOUT COMPLICATION: ICD-10-CM

## 2025-06-06 DIAGNOSIS — F32.A ANXIETY AND DEPRESSION: ICD-10-CM

## 2025-06-10 RX ORDER — BUPROPION HYDROCHLORIDE 150 MG/1
TABLET, EXTENDED RELEASE ORAL
Qty: 180 TABLET | Refills: 1 | Status: SHIPPED | OUTPATIENT
Start: 2025-06-10

## 2025-06-10 NOTE — PREPROCEDURE INSTRUCTIONS
Current Medications    Medication Instructions    apixaban (Eliquis) 5 mg tablet Continue until night before surgery     aspirin 81 mg chewable tablet Continue until night before surgery     atorvastatin (Lipitor) 40 mg tablet Continue until night before surgery     buPROPion SR (Wellbutrin SR) 150 mg 12 hr tablet Take morning of surgery with sip of water    spironolactone (Aldactone) 25 mg tablet Continue until night before surgery             NPO Instructions:    Do not eat any food after midnight tonight.  You may have 13 ounces of clear liquids until TWO hours before ARRIVAL to hospital. This includes water, black tea/coffee, (no milk or cream) apple juice and electrolyte drinks (Gatorade).    Additional Instructions:     Day of Surgery: Arrive in registration at 6:00 am for 7:30 am surgery    Enter through the Main Entrance of [facility], located at [address] Proceed to registration, located on the right hand side of the staircase. You will need your ID and insurance card for registration. Please ensure you have a responsible adult to drive you home. A responsible adult DOES NOT include rideshare service drivers (Uber, Lyft, etc), cab drivers, or public transportation drivers, but “provide a ride” is acceptable.    Take a shower before your procedure. After your shower avoid lotions, powders, deodorants or anything applied to the skin. If you wear contacts or glasses, wear the glasses. If you do not have glasses, please bring a case for your contacts. You may wear hearing aids and dentures, bring a case for them or we will provide one. Make sure you wear something loose and comfortable. Keep in mind your surgical procedure and wear something that will accommodate incisions or bandages. Please remove all jewelry and piercing's.    For further questions [facility] can be contacted at [phone] between 7AM-3PM.

## 2025-06-11 ENCOUNTER — ANESTHESIA (OUTPATIENT)
Dept: OPERATING ROOM | Facility: HOSPITAL | Age: 60
End: 2025-06-11
Payer: COMMERCIAL

## 2025-06-11 ENCOUNTER — ANESTHESIA EVENT (OUTPATIENT)
Dept: OPERATING ROOM | Facility: HOSPITAL | Age: 60
End: 2025-06-11
Payer: COMMERCIAL

## 2025-06-11 ENCOUNTER — APPOINTMENT (OUTPATIENT)
Dept: OPHTHALMOLOGY | Facility: CLINIC | Age: 60
End: 2025-06-11
Payer: COMMERCIAL

## 2025-06-11 ENCOUNTER — HOSPITAL ENCOUNTER (OUTPATIENT)
Facility: HOSPITAL | Age: 60
Setting detail: OUTPATIENT SURGERY
Discharge: HOME | End: 2025-06-11
Attending: OPHTHALMOLOGY | Admitting: OPHTHALMOLOGY
Payer: COMMERCIAL

## 2025-06-11 VITALS
TEMPERATURE: 97.7 F | RESPIRATION RATE: 17 BRPM | HEART RATE: 70 BPM | OXYGEN SATURATION: 100 % | DIASTOLIC BLOOD PRESSURE: 59 MMHG | SYSTOLIC BLOOD PRESSURE: 133 MMHG

## 2025-06-11 DIAGNOSIS — H25.11 AGE-RELATED NUCLEAR CATARACT OF RIGHT EYE: Primary | ICD-10-CM

## 2025-06-11 DIAGNOSIS — H25.813 COMBINED FORMS OF AGE-RELATED CATARACT OF BOTH EYES: ICD-10-CM

## 2025-06-11 DIAGNOSIS — Z96.1 PSEUDOPHAKIA, RIGHT EYE: Primary | ICD-10-CM

## 2025-06-11 PROCEDURE — 2500000005 HC RX 250 GENERAL PHARMACY W/O HCPCS: Performed by: OPHTHALMOLOGY

## 2025-06-11 PROCEDURE — 66984 XCAPSL CTRC RMVL W/O ECP: CPT | Performed by: OPHTHALMOLOGY

## 2025-06-11 PROCEDURE — A66984 PR REMV CATARACT EXTRACAP,INSERT LENS: Performed by: ANESTHESIOLOGY

## 2025-06-11 PROCEDURE — 2500000004 HC RX 250 GENERAL PHARMACY W/ HCPCS (ALT 636 FOR OP/ED)

## 2025-06-11 PROCEDURE — V2632 POST CHMBR INTRAOCULAR LENS: HCPCS | Performed by: OPHTHALMOLOGY

## 2025-06-11 PROCEDURE — 3600000003 HC OR TIME - INITIAL BASE CHARGE - PROCEDURE LEVEL THREE: Performed by: OPHTHALMOLOGY

## 2025-06-11 PROCEDURE — 2760000001 HC OR 276 NO HCPCS: Performed by: OPHTHALMOLOGY

## 2025-06-11 PROCEDURE — 3700000002 HC GENERAL ANESTHESIA TIME - EACH INCREMENTAL 1 MINUTE: Performed by: OPHTHALMOLOGY

## 2025-06-11 PROCEDURE — A66984 PR REMV CATARACT EXTRACAP,INSERT LENS

## 2025-06-11 PROCEDURE — 2500000001 HC RX 250 WO HCPCS SELF ADMINISTERED DRUGS (ALT 637 FOR MEDICARE OP): Performed by: OPHTHALMOLOGY

## 2025-06-11 PROCEDURE — 7100000009 HC PHASE TWO TIME - INITIAL BASE CHARGE: Performed by: OPHTHALMOLOGY

## 2025-06-11 PROCEDURE — 3700000001 HC GENERAL ANESTHESIA TIME - INITIAL BASE CHARGE: Performed by: OPHTHALMOLOGY

## 2025-06-11 PROCEDURE — 7100000010 HC PHASE TWO TIME - EACH INCREMENTAL 1 MINUTE: Performed by: OPHTHALMOLOGY

## 2025-06-11 PROCEDURE — 99024 POSTOP FOLLOW-UP VISIT: CPT | Performed by: OPHTHALMOLOGY

## 2025-06-11 PROCEDURE — 3600000008 HC OR TIME - EACH INCREMENTAL 1 MINUTE - PROCEDURE LEVEL THREE: Performed by: OPHTHALMOLOGY

## 2025-06-11 DEVICE — IMPLANTABLE DEVICE: Type: IMPLANTABLE DEVICE | Site: EYE | Status: FUNCTIONAL

## 2025-06-11 RX ORDER — TROPICAMIDE 10 MG/ML
1 SOLUTION/ DROPS OPHTHALMIC
Status: COMPLETED | OUTPATIENT
Start: 2025-06-11 | End: 2025-06-11

## 2025-06-11 RX ORDER — CYCLOPENTOLATE HYDROCHLORIDE 10 MG/ML
1 SOLUTION/ DROPS OPHTHALMIC
Status: COMPLETED | OUTPATIENT
Start: 2025-06-11 | End: 2025-06-11

## 2025-06-11 RX ORDER — PREDNISOLONE ACETATE 10 MG/ML
1 SUSPENSION/ DROPS OPHTHALMIC 4 TIMES DAILY
Qty: 5 ML | Refills: 2 | Status: SHIPPED | OUTPATIENT
Start: 2025-06-11

## 2025-06-11 RX ORDER — FENTANYL CITRATE 50 UG/ML
INJECTION, SOLUTION INTRAMUSCULAR; INTRAVENOUS AS NEEDED
Status: DISCONTINUED | OUTPATIENT
Start: 2025-06-11 | End: 2025-06-11

## 2025-06-11 RX ORDER — POVIDONE-IODINE 5 %
SOLUTION, NON-ORAL OPHTHALMIC (EYE) AS NEEDED
Status: DISCONTINUED | OUTPATIENT
Start: 2025-06-11 | End: 2025-06-11 | Stop reason: HOSPADM

## 2025-06-11 RX ORDER — TETRACAINE HYDROCHLORIDE 5 MG/ML
1 SOLUTION OPHTHALMIC ONCE
Status: COMPLETED | OUTPATIENT
Start: 2025-06-11 | End: 2025-06-11

## 2025-06-11 RX ORDER — ACETAMINOPHEN 325 MG/1
650 TABLET ORAL ONCE
Status: COMPLETED | OUTPATIENT
Start: 2025-06-11 | End: 2025-06-11

## 2025-06-11 RX ORDER — MOXIFLOXACIN 5 MG/ML
1 SOLUTION/ DROPS OPHTHALMIC 4 TIMES DAILY
Qty: 5 ML | Refills: 0 | Status: SHIPPED | OUTPATIENT
Start: 2025-06-11

## 2025-06-11 RX ORDER — PHENYLEPHRINE HYDROCHLORIDE 25 MG/ML
1 SOLUTION/ DROPS OPHTHALMIC
Status: COMPLETED | OUTPATIENT
Start: 2025-06-11 | End: 2025-06-11

## 2025-06-11 RX ORDER — MIDAZOLAM HYDROCHLORIDE 1 MG/ML
INJECTION, SOLUTION INTRAMUSCULAR; INTRAVENOUS AS NEEDED
Status: DISCONTINUED | OUTPATIENT
Start: 2025-06-11 | End: 2025-06-11

## 2025-06-11 RX ORDER — TETRACAINE HYDROCHLORIDE 5 MG/ML
SOLUTION OPHTHALMIC AS NEEDED
Status: DISCONTINUED | OUTPATIENT
Start: 2025-06-11 | End: 2025-06-11 | Stop reason: HOSPADM

## 2025-06-11 RX ADMIN — TROPICAMIDE 1 DROP: 10 SOLUTION/ DROPS OPHTHALMIC at 06:40

## 2025-06-11 RX ADMIN — PHENYLEPHRINE HYDROCHLORIDE 1 DROP: 25 SOLUTION/ DROPS OPHTHALMIC at 07:30

## 2025-06-11 RX ADMIN — PHENYLEPHRINE HYDROCHLORIDE 1 DROP: 25 SOLUTION/ DROPS OPHTHALMIC at 07:20

## 2025-06-11 RX ADMIN — CYCLOPENTOLATE HYDROCHLORIDE 1 DROP: 10 SOLUTION OPHTHALMIC at 06:40

## 2025-06-11 RX ADMIN — PHENYLEPHRINE HYDROCHLORIDE 1 DROP: 25 SOLUTION/ DROPS OPHTHALMIC at 07:00

## 2025-06-11 RX ADMIN — CYCLOPENTOLATE HYDROCHLORIDE 1 DROP: 10 SOLUTION OPHTHALMIC at 07:30

## 2025-06-11 RX ADMIN — TROPICAMIDE 1 DROP: 10 SOLUTION/ DROPS OPHTHALMIC at 07:30

## 2025-06-11 RX ADMIN — MIDAZOLAM 1 MG: 1 INJECTION INTRAMUSCULAR; INTRAVENOUS at 08:03

## 2025-06-11 RX ADMIN — CYCLOPENTOLATE HYDROCHLORIDE 1 DROP: 10 SOLUTION OPHTHALMIC at 07:00

## 2025-06-11 RX ADMIN — MIDAZOLAM 1 MG: 1 INJECTION INTRAMUSCULAR; INTRAVENOUS at 07:47

## 2025-06-11 RX ADMIN — ACETAMINOPHEN 650 MG: 325 TABLET ORAL at 08:33

## 2025-06-11 RX ADMIN — CYCLOPENTOLATE HYDROCHLORIDE 1 DROP: 10 SOLUTION OPHTHALMIC at 07:04

## 2025-06-11 RX ADMIN — PHENYLEPHRINE HYDROCHLORIDE 1 DROP: 25 SOLUTION/ DROPS OPHTHALMIC at 06:40

## 2025-06-11 RX ADMIN — CYCLOPENTOLATE HYDROCHLORIDE 1 DROP: 10 SOLUTION OPHTHALMIC at 07:20

## 2025-06-11 RX ADMIN — TETRACAINE HYDROCHLORIDE 1 DROP: 5 SOLUTION OPHTHALMIC at 06:40

## 2025-06-11 RX ADMIN — TROPICAMIDE 1 DROP: 10 SOLUTION/ DROPS OPHTHALMIC at 06:50

## 2025-06-11 RX ADMIN — MIDAZOLAM 2 MG: 1 INJECTION INTRAMUSCULAR; INTRAVENOUS at 07:45

## 2025-06-11 RX ADMIN — PHENYLEPHRINE HYDROCHLORIDE 1 DROP: 25 SOLUTION/ DROPS OPHTHALMIC at 06:50

## 2025-06-11 RX ADMIN — PHENYLEPHRINE HYDROCHLORIDE 1 DROP: 25 SOLUTION/ DROPS OPHTHALMIC at 07:10

## 2025-06-11 RX ADMIN — FENTANYL CITRATE 50 MCG: 50 INJECTION, SOLUTION INTRAMUSCULAR; INTRAVENOUS at 08:03

## 2025-06-11 RX ADMIN — TROPICAMIDE 1 DROP: 10 SOLUTION/ DROPS OPHTHALMIC at 07:00

## 2025-06-11 RX ADMIN — TROPICAMIDE 1 DROP: 10 SOLUTION/ DROPS OPHTHALMIC at 07:20

## 2025-06-11 RX ADMIN — TROPICAMIDE 1 DROP: 10 SOLUTION/ DROPS OPHTHALMIC at 07:10

## 2025-06-11 RX ADMIN — CYCLOPENTOLATE HYDROCHLORIDE 1 DROP: 10 SOLUTION OPHTHALMIC at 07:10

## 2025-06-11 RX ADMIN — TETRACAINE HYDROCHLORIDE 1 DROP: 5 SOLUTION OPHTHALMIC at 07:10

## 2025-06-11 SDOH — HEALTH STABILITY: MENTAL HEALTH: CURRENT SMOKER: 0

## 2025-06-11 ASSESSMENT — VISUAL ACUITY
METHOD: SNELLEN - LINEAR
OD_SC: 20/200

## 2025-06-11 ASSESSMENT — PAIN SCALES - GENERAL
PAINLEVEL_OUTOF10: 3
PAINLEVEL_OUTOF10: 0 - NO PAIN
PAINLEVEL_OUTOF10: 3

## 2025-06-11 ASSESSMENT — TONOMETRY
IOP_METHOD: GOLDMANN APPLANATION
OD_IOP_MMHG: 17

## 2025-06-11 ASSESSMENT — PAIN DESCRIPTION - ORIENTATION: ORIENTATION: RIGHT

## 2025-06-11 ASSESSMENT — ENCOUNTER SYMPTOMS
CONSTITUTIONAL NEGATIVE: 1
RESPIRATORY NEGATIVE: 1
GASTROINTESTINAL NEGATIVE: 1
CARDIOVASCULAR NEGATIVE: 1
NEUROLOGICAL NEGATIVE: 1

## 2025-06-11 ASSESSMENT — SLIT LAMP EXAM - LIDS: COMMENTS: NORMAL

## 2025-06-11 ASSESSMENT — EXTERNAL EXAM - RIGHT EYE: OD_EXAM: NORMAL

## 2025-06-11 ASSESSMENT — PAIN - FUNCTIONAL ASSESSMENT
PAIN_FUNCTIONAL_ASSESSMENT: 0-10

## 2025-06-11 ASSESSMENT — PAIN DESCRIPTION - LOCATION: LOCATION: EYE

## 2025-06-11 NOTE — ANESTHESIA PREPROCEDURE EVALUATION
Patient: Tomasa Gutierres    Procedure Information       Date/Time: 06/11/25 0730    Procedure: RIGHT CATARACT EXTRACTION WITH INTRAOCULAR LENS IMPLANTATION (Right)    Location: [facility] OR 02 / [facility] OR    Surgeons: Rocío Watts MD            Relevant Problems   Anesthesia   (-) Difficult intubation   (-) PONV (postoperative nausea and vomiting)      Cardiac   (+) Atrial fibrillation (Multi)   (+) New onset a-fib (Multi)   (+) Paroxysmal atrial fibrillation (Multi)   (+) Persistent atrial fibrillation (Multi)   (+) Primary hypertension      Neuro   (+) Anxiety and depression   (+) History of left PCA stroke      /Renal   (+) Hyponatremia      HEENT   (+) Acute non-recurrent frontal sinusitis       Clinical information reviewed:    Allergies  Meds               NPO Detail:  NPO/Void Status  Date of Last Liquid: 06/11/25  Time of Last Liquid: 0445  Date of Last Solid: 06/10/25  Time of Last Solid: 1830  Last Intake Type: Clear fluids         Physical Exam    Airway  Mallampati: II  TM distance: >3 FB  Neck ROM: full     Cardiovascular - normal exam  Rhythm: regular  Rate: normal     Dental    Pulmonary - normal exam   Abdominal            Anesthesia Plan    History of general anesthesia?: yes  History of complications of general anesthesia?: no    ASA 3     MAC     The patient is not a current smoker.  Education provided regarding risk of obstructive sleep apnea.  intravenous induction   Postoperative pain plan includes opioids.  Trial extubation is planned.  Anesthetic plan and risks discussed with patient.    Plan discussed with CRNA, CAA and attending.

## 2025-06-11 NOTE — ANESTHESIA POSTPROCEDURE EVALUATION
Patient: Tomasa Gutierres    Procedure Summary       Date: 06/11/25 Room / Location: [facility] 02 / [facility]    Anesthesia Start: 0741 Anesthesia Stop: 0820    Procedure: RIGHT CATARACT EXTRACTION WITH INTRAOCULAR LENS IMPLANTATION (Right: Eye) Diagnosis:       Combined forms of age-related cataract of both eyes      (Combined forms of age-related cataract of both eyes [H25.813])    Surgeons: Rocío Watts MD Responsible Provider: Agusto Gutierrez MD    Anesthesia Type: MAC ASA Status: 3            Anesthesia Type: MAC    Vitals Value Taken Time   /66 06/11/25 08:30   Temp 36.5 °C (97.7 °F) 06/11/25 08:20   Pulse 75 06/11/25 08:32   Resp 15 06/11/25 08:20   SpO2 96 % 06/11/25 08:32   Vitals shown include unfiled device data.    Anesthesia Post Evaluation    Patient location during evaluation: PACU  Patient participation: complete - patient participated  Level of consciousness: awake and alert  Pain management: satisfactory to patient  Airway patency: patent  Cardiovascular status: acceptable  Respiratory status: acceptable  Hydration status: acceptable  Postoperative Nausea and Vomiting: none        No notable events documented.

## 2025-06-11 NOTE — H&P
History Of Present Illness  Tomasa Gutierres is a 59 y.o. female presenting with cataract, right eye.     Past Medical History  Medical History[1]    Surgical History  Surgical History[2]     Social History  She reports that she has been smoking cigarettes. She has a 7.5 pack-year smoking history. She has never used smokeless tobacco. She reports that she does not currently use alcohol. She reports that she does not use drugs.    Family History  Family History[3]     Allergies  Azithromycin, Orange, and Orange juice    Review of Systems   Constitutional: Negative.    Respiratory: Negative.     Cardiovascular: Negative.    Gastrointestinal: Negative.    Neurological: Negative.         Physical Exam  Cardiovascular:      Rate and Rhythm: Normal rate.   Pulmonary:      Effort: Pulmonary effort is normal.   Abdominal:      Palpations: Abdomen is soft.   Skin:     General: Skin is warm.   Neurological:      Mental Status: She is alert. Mental status is at baseline.   Psychiatric:         Mood and Affect: Mood normal.         Thought Content: Thought content normal.          Last Recorded Vitals  Blood pressure 147/83, pulse 89, temperature 36.7 °C (98.1 °F), temperature source Temporal, resp. rate 18, SpO2 99%.    Relevant Results        Current Outpatient Medications   Medication Instructions    apixaban (ELIQUIS) 5 mg, oral, Every 12 hours    aspirin 81 mg, oral, Daily    atorvastatin (LIPITOR) 40 mg, oral, Daily    buPROPion SR (Wellbutrin SR) 150 mg 12 hr tablet TAKE ONE TABLET (150 mg) BY MOUTH TWO TIMES A DAY. do not crush, chew, or split    spironolactone (ALDACTONE) 25 mg, oral, Daily          Assessment & Plan  Combined forms of age-related cataract of both eyes      Tomasa Gutierres is a 59 y.o. female presenting with cataract, right eye here for phaco/intraocular lens (IOL) insertion, right eye          Celi Bowers MD         [1]   Past Medical History:  Diagnosis Date    Arrhythmia 12/24    Atrial  fibrillation (Multi)     Closed fracture of upper end of humerus 11/03/2015    Dry eyes     DVT (deep venous thrombosis) (Multi)     tx with Coumadin    Elevated liver enzymes 12/13/2023    Intermittent exotropia of right eye     Migraines     Pre-hypertension 12/13/2023    Scoliosis     Shoulder fracture, left     Stroke (Multi)     CVA/TIA    Stroke (Multi) 12/2023    left PCA territory infarct   [2]   Past Surgical History:  Procedure Laterality Date    ECTOPIC PREGNANCY SURGERY      EYE SURGERY Right     x2    FRACTURE SURGERY      left wrist    HERNIA REPAIR      MR HEAD ANGIO WO IV CONTRAST  12/14/2023    MR HEAD ANGIO WO IV CONTRAST 12/14/2023 [facility]    MR NECK ANGIO WO IV CONTRAST  12/14/2023    MR NECK ANGIO WO IV CONTRAST 12/14/2023 [facility]    OTHER SURGICAL HISTORY     [3]   Family History  Problem Relation Name Age of Onset    Cancer Mother Nicolasa Nenita     Heart attack Father          dies at the age of 50's    No Known Problems Sister      Other (borderline diabetic) Maternal Grandfather      Heart attack Paternal Grandmother      Stroke Paternal Grandfather Tomasa Gutierres     No Known Problems Son      Glaucoma Neg Hx      Macular degeneration Neg Hx

## 2025-06-11 NOTE — OP NOTE
RIGHT CATARACT EXTRACTION WITH INTRAOCULAR LENS IMPLANTATION (R) Operative Note     Date: 2025  OR Location: [facility] OR    Name: Tomasa Gutierres, : 1965, Age: 59 y.o., MRN: [id number], Sex: female    Diagnosis  Pre-op Diagnosis      * Combined forms of age-related cataract of both eyes [H25.813] Post-op Diagnosis     * Combined forms of age-related cataract of both eyes [H25.813]     Procedures  RIGHT CATARACT EXTRACTION WITH INTRAOCULAR LENS IMPLANTATION  70144 - AR XCAPSL CTRC RMVL INSJ IO LENS PROSTH W/O ECP      Surgeons      * Rocío Watts - Primary    Resident/Fellow/Other Assistant:  Surgeons and Role:  * No surgeons found with a matching role *    Staff:   Circulator: Shreya    Anesthesia Staff: Anesthesiologist: Agusto Gutierrez MD  CRNA: SONYA Ellison  Frontline Breaker: SONYA Palumbo, DNAP    Procedure Summary  Anesthesia: Monitor Anesthesia Care  ASA: III  Estimated Blood Loss: minimal mL  Intra-op Medications:   Administrations occurring from 0730 to 0825 on 25:   Medication Name Total Dose   tobramycin-dexamethasone (Tobradex) ophthalmic ointment 0.5 inch   povidone-iodine 5 % ophthalmic solution 1 Application   tetracaine (PF) 0.5 % ophthalmic solution 2 drop   lidocaine PF (Xylocaine) 10 mg/mL (1 %) 1 mL, EPINEPHrine (Adrenalin) 1 mg/mL 0.5 mL in balanced salts (BSS) 0.5 mL syringe Cannot be calculated   fentaNYL (Sublimaze) injection 50 mcg/mL 50 mcg   midazolam (Versed) injection 1 mg/mL 4 mg              Anesthesia Record               Intraprocedure I/O Totals       None           Specimen: No specimens collected              Drains and/or Catheters: * None in log *    Tourniquet Times:         Implants:  Implants       Type Name Action Serial No.      Lens LENS, INTRAOCULAR, SY60WF 27.5 - [id number] - [id number] Implanted [id number]              Findings: Right cataract    Indications: Tomasa Gutierres is an 59 y.o. female who is having surgery for  Combined forms of age-related cataract of both eyes [H25.813].     The patient was seen in the preoperative area. The risks, benefits, complications, treatment options, non-operative alternatives, expected recovery and outcomes were discussed with the patient. The possibilities of reaction to medication, pulmonary aspiration, injury to surrounding structures, bleeding, recurrent infection, the need for additional procedures, failure to diagnose a condition, and creating a complication requiring transfusion or operation were discussed with the patient. The patient concurred with the proposed plan, giving informed consent.  The site of surgery was properly noted/marked if necessary per policy. The patient has been actively warmed in preoperative area. Preoperative antibiotics are not indicated. Venous thrombosis prophylaxis are not indicated.    Procedure Details: The patient was placed in the supine position on the operating room table where appropriate blood pressure and cardiac monitoring were initiated. The patient was prepped and draped in the usual sterile fashion for intraocular surgery. This included instillation of Betadine 5% onto the ocular surface followed by irrigation with balance salt solution a minute or two later. A lid speculum was placed and the operating microscope was positioned. One paracentesis stab incision was made to the left of the planned cataract incision with a 15-degree supersharp blade. 1 ml of preservative free lidocaine was injected into the AC. Viscoat was used to replace the aqueous humor. A temporal clear corneal wound was fashioned beginning at the limbus with a 2.4 mm keratome, extending 2 mm into clear cornea before entering the anterior chamber. A continuous tear circular capsulorhexis of approximately 5 mm in diameter was performed. Hydrodissection was performed using a Peralta canula. The endothelium was coated with viscoat again. Using the Ozil handpiece on the [employer] Lens  Removal System, the nucleus was emulsified and aspirated using a divide-and-conquer technique. Residual cortex was removed from the eye with the irrigation/aspiration bimanually. ProVisc was used to inflate the capsular bag. The lens implant was inspected and found to be free of visible defects. The lens used was an Johnie model SY60WF, power 27.50 diopter intraocular lens. The lens was inserted into the capsular bag using the [place] insertion mechanism. The lens was centered with a Temple hook. Residual viscoelastic was removed from the eye with the irrigation/aspiration instrument. The wounds were checked and found to be watertight. The lid speculum was removed and the eye was dressed with Maxitrol ointment, eye pad, tape, and shield. The patient tolerated the procedure well, and there were no complications.    Evidence of Infection: No   Complications:  None; patient tolerated the procedure well.    Disposition: PACU - hemodynamically stable.  Condition: stable                 Additional Details: none    Attending Attestation: I performed the procedure.    Rocío Watts  Phone Number: [phone]

## 2025-06-11 NOTE — PROGRESS NOTES
Pseudophakia     POD#0, post Right CEIOL, doing well with moderate edema   History of amblyopia right eye      Plan:  Right eye:   Pred QID  Moxi QID  Return precautions  Proceed with CEIOL OS as scheduled   See in 1 week

## 2025-06-11 NOTE — BRIEF OP NOTE
Date: 2025  OR Location: [facility] [place] OR    Name: Tomasa Gutierres, : 1965, Age: 59 y.o., MRN: [id number], Sex: female    Diagnosis  Pre-op Diagnosis      * Combined forms of age-related cataract of both eyes [H25.813] Post-op Diagnosis     * Combined forms of age-related cataract of both eyes [H25.813]     Procedures  RIGHT CATARACT EXTRACTION WITH INTRAOCULAR LENS IMPLANTATION  55803 - [place] XCAPSL CTRC RMVL INSJ IO LENS PROSTH W/O ECP      Surgeons      * Rocío Watts - Primary    Resident/Fellow/Other Assistant:  Surgeons and Role:  * No surgeons found with a matching role *    Staff:   Circulator: Shreya    Anesthesia Staff: Anesthesiologist: Agusto Gutierrez MD  CRNA: SONYA Ellison  Frontline Breaker: SONYA Palumbo, DNAP    Procedure Summary  Anesthesia: Monitor Anesthesia Care  ASA: III  Estimated Blood Loss: <5 mL  Intra-op Medications:   Administrations occurring from 0730 to 0825 on 25:   Medication Name Total Dose   tobramycin-dexamethasone (Tobradex) ophthalmic ointment 0.5 inch   povidone-iodine 5 % ophthalmic solution 1 Application   tetracaine (PF) 0.5 % ophthalmic solution 2 drop   lidocaine PF (Xylocaine) 10 mg/mL (1 %) 1 mL, EPINEPHrine (Adrenalin) 1 mg/mL 0.5 mL in balanced salts (BSS) 0.5 mL syringe Cannot be calculated   fentaNYL (Sublimaze) injection 50 mcg/mL 50 mcg   midazolam (Versed) injection 1 mg/mL 4 mg              Anesthesia Record               Intraprocedure I/O Totals       None           Specimen: No specimens collected               Findings: cataract, right eye    Complications:  None; patient tolerated the procedure well.     Disposition: PACU - hemodynamically stable.  Condition: stable  Specimens Collected: No specimens collected  Attending Attestation:     Rocío Watts  Phone Number: [phone]

## 2025-06-12 ASSESSMENT — PAIN SCALES - GENERAL: PAINLEVEL_OUTOF10: 0 - NO PAIN

## 2025-06-20 ENCOUNTER — APPOINTMENT (OUTPATIENT)
Dept: OPHTHALMOLOGY | Facility: CLINIC | Age: 60
End: 2025-06-20
Payer: COMMERCIAL

## 2025-06-20 VITALS — WEIGHT: 147.71 LBS | BODY MASS INDEX: 23.13 KG/M2

## 2025-06-20 DIAGNOSIS — Z96.1 PSEUDOPHAKIA, RIGHT EYE: Primary | ICD-10-CM

## 2025-06-20 PROCEDURE — 99024 POSTOP FOLLOW-UP VISIT: CPT | Performed by: OPHTHALMOLOGY

## 2025-06-20 ASSESSMENT — TONOMETRY
OD_IOP_MMHG: 17
OS_IOP_MMHG: 18
IOP_METHOD: TONOPEN

## 2025-06-20 ASSESSMENT — REFRACTION_MANIFEST
OD_CYLINDER: -0.75
METHOD_AUTOREFRACTION: 1
OD_SPHERE: +0.75
OD_AXIS: 180

## 2025-06-20 ASSESSMENT — VISUAL ACUITY
METHOD: SNELLEN - LINEAR
OD_SC: 20/20-2
OS_SC: 20/50

## 2025-06-20 ASSESSMENT — ENCOUNTER SYMPTOMS: EYES NEGATIVE: 1

## 2025-06-23 NOTE — PROGRESS NOTES
Assessment/Plan   Diagnoses and all orders for this visit:  Pseudophakia, right eye    POW 1 s/p phaco OD, doing well.    Plan:  Taper pred one drop weekly  Stop Moxi  Return precautions  See for CEIOL OS as scheduled.

## 2025-06-24 NOTE — PREPROCEDURE INSTRUCTIONS
Current Medications    Medication Instructions    apixaban (Eliquis) 5 mg tablet Take morning of surgery with sip of water    aspirin 81 mg chewable tablet Continue until the night before surgery    atorvastatin (Lipitor) 40 mg tablet Continue until night before surgery    buPROPion SR (Wellbutrin SR) 150 mg 12 hr tablet Take morning of surgery with sip of water    moxifloxacin (Vigamox) 0.5 % ophthalmic solution Continue as prescribed     prednisoLONE acetate (Pred-Forte) 1 % ophthalmic suspension Continue as prescribed     spironolactone (Aldactone) 25 mg tablet Take morning of surgery with sip of water           NPO Instructions:    Do not eat any food after midnight the night before your surgery/procedure.  You may have 13 ounces of clear liquids until TWO hours before your ARRIVAL time to the hospital the day of surgery (completed by 0645). This includes water, black tea/coffee, (no milk or cream) apple juice and electrolyte drinks (Gatorade-no red colors).  You may chew gum up to TWO hours before your surgery/procedure.    Additional Instructions:     Day of Surgery: Arrival in registration at 0845 for a 1015 surgery.    Stop smoking tobacco 24 hours prior to your surgery and stop the day of surgery.     Enter through the main entrance of [facility], located at [address]. Proceed to registration, located on the right hand side of the staircase. You will need your ID and insurance card for registration. Please ensure you have a responsible adult to drive you home. A responsible adult DOES NOT include rideshare service drivers (Uber, Lyft, etc), cab drivers, or public transportation drivers, but “provide a ride” is acceptable.    Take a shower before your procedure. After your shower avoid lotions, powders, deodorants or anything applied to the skin. If you wear contacts or glasses, wear the glasses. If you do not have glasses, please bring a case for your contacts. You may wear hearing aids  and dentures, bring a case for them or we will provide one. Make sure you wear something loose and comfortable. Keep in mind your surgical procedure and wear something that will accommodate incisions or bandages. Please remove all jewelry and piercing's.     For further questions Cory TELLO can be contacted at [phone] between 7AM-3PM.

## 2025-06-25 ENCOUNTER — ANESTHESIA (OUTPATIENT)
Dept: OPERATING ROOM | Facility: HOSPITAL | Age: 60
End: 2025-06-25
Payer: COMMERCIAL

## 2025-06-25 ENCOUNTER — ANESTHESIA EVENT (OUTPATIENT)
Dept: OPERATING ROOM | Facility: HOSPITAL | Age: 60
End: 2025-06-25
Payer: COMMERCIAL

## 2025-06-25 ENCOUNTER — PHARMACY VISIT (OUTPATIENT)
Dept: PHARMACY | Facility: CLINIC | Age: 60
End: 2025-06-25
Payer: MEDICARE

## 2025-06-25 VITALS
TEMPERATURE: 97.3 F | WEIGHT: 147.93 LBS | RESPIRATION RATE: 16 BRPM | HEART RATE: 84 BPM | BODY MASS INDEX: 23.22 KG/M2 | DIASTOLIC BLOOD PRESSURE: 66 MMHG | SYSTOLIC BLOOD PRESSURE: 140 MMHG | OXYGEN SATURATION: 95 % | HEIGHT: 67 IN

## 2025-06-25 PROCEDURE — 3700000002 HC GENERAL ANESTHESIA TIME - EACH INCREMENTAL 1 MINUTE: Performed by: OPHTHALMOLOGY

## 2025-06-25 PROCEDURE — 3600000003 HC OR TIME - INITIAL BASE CHARGE - PROCEDURE LEVEL THREE: Performed by: OPHTHALMOLOGY

## 2025-06-25 PROCEDURE — RXMED WILLOW AMBULATORY MEDICATION CHARGE

## 2025-06-25 PROCEDURE — A66982 PR REMV CATARACT EXTRACAP,INSERT LENS,COMP: Performed by: ANESTHESIOLOGY

## 2025-06-25 PROCEDURE — 2500000005 HC RX 250 GENERAL PHARMACY W/O HCPCS: Performed by: OPHTHALMOLOGY

## 2025-06-25 PROCEDURE — 2500000004 HC RX 250 GENERAL PHARMACY W/ HCPCS (ALT 636 FOR OP/ED): Performed by: OPHTHALMOLOGY

## 2025-06-25 PROCEDURE — V2632 POST CHMBR INTRAOCULAR LENS: HCPCS | Performed by: OPHTHALMOLOGY

## 2025-06-25 PROCEDURE — 2760000001 HC OR 276 NO HCPCS: Performed by: OPHTHALMOLOGY

## 2025-06-25 PROCEDURE — A66982 PR REMV CATARACT EXTRACAP,INSERT LENS,COMP

## 2025-06-25 PROCEDURE — 66982 XCAPSL CTRC RMVL CPLX WO ECP: CPT | Performed by: OPHTHALMOLOGY

## 2025-06-25 PROCEDURE — 7100000009 HC PHASE TWO TIME - INITIAL BASE CHARGE: Performed by: OPHTHALMOLOGY

## 2025-06-25 PROCEDURE — 2780000003 HC OR 278 NO HCPCS: Performed by: OPHTHALMOLOGY

## 2025-06-25 PROCEDURE — 3700000001 HC GENERAL ANESTHESIA TIME - INITIAL BASE CHARGE: Performed by: OPHTHALMOLOGY

## 2025-06-25 PROCEDURE — 2500000004 HC RX 250 GENERAL PHARMACY W/ HCPCS (ALT 636 FOR OP/ED)

## 2025-06-25 PROCEDURE — 3600000008 HC OR TIME - EACH INCREMENTAL 1 MINUTE - PROCEDURE LEVEL THREE: Performed by: OPHTHALMOLOGY

## 2025-06-25 PROCEDURE — 7100000010 HC PHASE TWO TIME - EACH INCREMENTAL 1 MINUTE: Performed by: OPHTHALMOLOGY

## 2025-06-25 DEVICE — STERILE UV AND BLUE LIGHT FILTERING ACRYLIC FOLDABLE ASPHERIC POSTERIOR CHAMBER INTRAOCULAR LENS
Type: IMPLANTABLE DEVICE | Site: EYE | Status: FUNCTIONAL
Brand: CLAREON

## 2025-06-25 RX ORDER — IPRATROPIUM BROMIDE AND ALBUTEROL SULFATE 2.5; .5 MG/3ML; MG/3ML
3 SOLUTION RESPIRATORY (INHALATION)
Status: DISCONTINUED | OUTPATIENT
Start: 2025-06-25 | End: 2025-06-25 | Stop reason: HOSPADM

## 2025-06-25 RX ORDER — ONDANSETRON HYDROCHLORIDE 2 MG/ML
4 INJECTION, SOLUTION INTRAVENOUS ONCE AS NEEDED
Status: DISCONTINUED | OUTPATIENT
Start: 2025-06-25 | End: 2025-06-25 | Stop reason: HOSPADM

## 2025-06-25 RX ORDER — PREDNISOLONE ACETATE 10 MG/ML
1 SUSPENSION/ DROPS OPHTHALMIC 4 TIMES DAILY
Qty: 5 ML | Refills: 0 | Status: SHIPPED | OUTPATIENT
Start: 2025-06-25

## 2025-06-25 RX ORDER — TETRACAINE HYDROCHLORIDE 5 MG/ML
1 SOLUTION OPHTHALMIC ONCE
Status: COMPLETED | OUTPATIENT
Start: 2025-06-25 | End: 2025-06-25

## 2025-06-25 RX ORDER — IPRATROPIUM BROMIDE 0.5 MG/2.5ML
500 SOLUTION RESPIRATORY (INHALATION) ONCE
Status: DISCONTINUED | OUTPATIENT
Start: 2025-06-25 | End: 2025-06-25 | Stop reason: HOSPADM

## 2025-06-25 RX ORDER — CYCLOPENTOLATE HYDROCHLORIDE 10 MG/ML
1 SOLUTION/ DROPS OPHTHALMIC
Status: COMPLETED | OUTPATIENT
Start: 2025-06-25 | End: 2025-06-25

## 2025-06-25 RX ORDER — SODIUM CHLORIDE, SODIUM LACTATE, POTASSIUM CHLORIDE, CALCIUM CHLORIDE 600; 310; 30; 20 MG/100ML; MG/100ML; MG/100ML; MG/100ML
100 INJECTION, SOLUTION INTRAVENOUS CONTINUOUS
Status: DISCONTINUED | OUTPATIENT
Start: 2025-06-25 | End: 2025-06-25 | Stop reason: HOSPADM

## 2025-06-25 RX ORDER — ACETAMINOPHEN 325 MG/1
650 TABLET ORAL EVERY 4 HOURS PRN
Status: DISCONTINUED | OUTPATIENT
Start: 2025-06-25 | End: 2025-06-25 | Stop reason: HOSPADM

## 2025-06-25 RX ORDER — FENTANYL CITRATE 50 UG/ML
INJECTION, SOLUTION INTRAMUSCULAR; INTRAVENOUS AS NEEDED
Status: DISCONTINUED | OUTPATIENT
Start: 2025-06-25 | End: 2025-06-25

## 2025-06-25 RX ORDER — KETOROLAC TROMETHAMINE 5 MG/ML
1 SOLUTION OPHTHALMIC 4 TIMES DAILY
Qty: 5 ML | Refills: 0 | Status: SHIPPED | OUTPATIENT
Start: 2025-06-25 | End: 2025-06-25

## 2025-06-25 RX ORDER — DEXAMETHASONE SODIUM PHOSPHATE 10 MG/ML
6 INJECTION INTRAMUSCULAR; INTRAVENOUS ONCE
Status: DISCONTINUED | OUTPATIENT
Start: 2025-06-25 | End: 2025-06-25 | Stop reason: HOSPADM

## 2025-06-25 RX ORDER — TETRACAINE HYDROCHLORIDE 5 MG/ML
SOLUTION OPHTHALMIC AS NEEDED
Status: DISCONTINUED | OUTPATIENT
Start: 2025-06-25 | End: 2025-06-25 | Stop reason: HOSPADM

## 2025-06-25 RX ORDER — POVIDONE-IODINE 5 %
SOLUTION, NON-ORAL OPHTHALMIC (EYE) AS NEEDED
Status: DISCONTINUED | OUTPATIENT
Start: 2025-06-25 | End: 2025-06-25 | Stop reason: HOSPADM

## 2025-06-25 RX ORDER — KETOROLAC TROMETHAMINE 5 MG/ML
1 SOLUTION OPHTHALMIC 4 TIMES DAILY
Qty: 5 ML | Refills: 0 | Status: SHIPPED | OUTPATIENT
Start: 2025-06-25

## 2025-06-25 RX ORDER — ALBUTEROL SULFATE 0.83 MG/ML
2.5 SOLUTION RESPIRATORY (INHALATION) ONCE AS NEEDED
Status: DISCONTINUED | OUTPATIENT
Start: 2025-06-25 | End: 2025-06-25 | Stop reason: HOSPADM

## 2025-06-25 RX ORDER — PHENYLEPHRINE HYDROCHLORIDE 25 MG/ML
1 SOLUTION/ DROPS OPHTHALMIC
Status: COMPLETED | OUTPATIENT
Start: 2025-06-25 | End: 2025-06-25

## 2025-06-25 RX ORDER — TROPICAMIDE 10 MG/ML
1 SOLUTION/ DROPS OPHTHALMIC
Status: COMPLETED | OUTPATIENT
Start: 2025-06-25 | End: 2025-06-25

## 2025-06-25 RX ORDER — MIDAZOLAM HYDROCHLORIDE 1 MG/ML
INJECTION, SOLUTION INTRAMUSCULAR; INTRAVENOUS AS NEEDED
Status: DISCONTINUED | OUTPATIENT
Start: 2025-06-25 | End: 2025-06-25

## 2025-06-25 RX ORDER — PROCHLORPERAZINE EDISYLATE 5 MG/ML
5 INJECTION INTRAMUSCULAR; INTRAVENOUS ONCE AS NEEDED
Status: DISCONTINUED | OUTPATIENT
Start: 2025-06-25 | End: 2025-06-25 | Stop reason: HOSPADM

## 2025-06-25 RX ORDER — LIDOCAINE HYDROCHLORIDE 10 MG/ML
0.1 INJECTION, SOLUTION INFILTRATION; PERINEURAL ONCE
Status: DISCONTINUED | OUTPATIENT
Start: 2025-06-25 | End: 2025-06-25 | Stop reason: HOSPADM

## 2025-06-25 RX ORDER — MOXIFLOXACIN 5 MG/ML
1 SOLUTION/ DROPS OPHTHALMIC 4 TIMES DAILY
Qty: 3 ML | Refills: 0 | Status: SHIPPED | OUTPATIENT
Start: 2025-06-25

## 2025-06-25 RX ADMIN — PHENYLEPHRINE HYDROCHLORIDE 1 DROP: 25 SOLUTION/ DROPS OPHTHALMIC at 09:10

## 2025-06-25 RX ADMIN — TROPICAMIDE 1 DROP: 10 SOLUTION/ DROPS OPHTHALMIC at 09:02

## 2025-06-25 RX ADMIN — CYCLOPENTOLATE HYDROCHLORIDE 1 DROP: 10 SOLUTION OPHTHALMIC at 09:09

## 2025-06-25 RX ADMIN — PHENYLEPHRINE HYDROCHLORIDE 1 DROP: 25 SOLUTION/ DROPS OPHTHALMIC at 09:16

## 2025-06-25 RX ADMIN — TETRACAINE HYDROCHLORIDE 1 DROP: 5 SOLUTION OPHTHALMIC at 09:02

## 2025-06-25 RX ADMIN — TROPICAMIDE 1 DROP: 10 SOLUTION/ DROPS OPHTHALMIC at 09:10

## 2025-06-25 RX ADMIN — CYCLOPENTOLATE HYDROCHLORIDE 1 DROP: 10 SOLUTION OPHTHALMIC at 09:17

## 2025-06-25 RX ADMIN — CYCLOPENTOLATE HYDROCHLORIDE 1 DROP: 10 SOLUTION OPHTHALMIC at 09:03

## 2025-06-25 RX ADMIN — FENTANYL CITRATE 50 MCG: 50 INJECTION, SOLUTION INTRAMUSCULAR; INTRAVENOUS at 10:06

## 2025-06-25 RX ADMIN — PHENYLEPHRINE HYDROCHLORIDE 1 DROP: 25 SOLUTION/ DROPS OPHTHALMIC at 09:02

## 2025-06-25 RX ADMIN — MIDAZOLAM 4 MG: 1 INJECTION INTRAMUSCULAR; INTRAVENOUS at 10:06

## 2025-06-25 RX ADMIN — TROPICAMIDE 1 DROP: 10 SOLUTION/ DROPS OPHTHALMIC at 09:17

## 2025-06-25 SDOH — HEALTH STABILITY: MENTAL HEALTH: CURRENT SMOKER: 1

## 2025-06-25 ASSESSMENT — PAIN SCALES - GENERAL
PAINLEVEL_OUTOF10: 0 - NO PAIN

## 2025-06-25 ASSESSMENT — PAIN - FUNCTIONAL ASSESSMENT
PAIN_FUNCTIONAL_ASSESSMENT: 0-10
PAIN_FUNCTIONAL_ASSESSMENT: 0-10

## 2025-06-25 NOTE — OP NOTE
LEFT CATARACT EXTRACTION WITH INTRAOCULAR LENS IMPLANTATION (L) Operative Note     Date: 2025  OR Location: [facility] OR    Name: Tomasa Gutierres, : 1965, Age: 59 y.o., MRN: [id number], Sex: female    Diagnosis  Pre-op Diagnosis      * Combined forms of age-related cataract of both eyes [H25.813] Post-op Diagnosis     * Combined forms of age-related cataract of both eyes [H25.813]     Procedures  LEFT CATARACT EXTRACTION WITH INTRAOCULAR LENS IMPLANTATION  69691 - [place] XCAPSL CTRC RMVL INSJ IO LENS PROSTH W/O ECP      Surgeons      * Rocío Watts - Primary    Resident/Fellow/Other Assistant:  Surgeons and Role:  * No surgeons found with a matching role *    Staff:   Christopherulator: Shreya  Surgical Assistant: Josseline    Anesthesia Staff: Anesthesiologist: Jalen Kendall MD  CRNA: ADRYAN Ellison-CRNA    Procedure Summary  Anesthesia: Monitor Anesthesia Care  ASA: III  Estimated Blood Loss: minimal mL  Intra-op Medications: Administrations occurring from 1015 to 1110 on 25:  * No intraprocedure medications in log *           Anesthesia Record               Intraprocedure I/O Totals       None           Specimen: No specimens collected              Drains and/or Catheters: * None in log *    Tourniquet Times:         Implants:  Implants       Type Name Action Serial No.       IOL Implanted [id number]      ctr Implanted [id number]              Findings: Left cataract    Indications: Tomasa Gutierres is an 59 y.o. 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     The patient was seen in the preoperative area. The risks, benefits, complications, treatment options, non-operative alternatives, expected recovery and outcomes were discussed with the patient. The possibilities of reaction to medication, pulmonary aspiration, injury to surrounding structures, bleeding, recurrent infection, the need for additional procedures, failure to diagnose a condition,  and creating a complication requiring transfusion or operation were discussed with the patient. The patient concurred with the proposed plan, giving informed consent.  The site of surgery was properly noted/marked if necessary per policy. The patient has been actively warmed in preoperative area. Preoperative antibiotics are not indicated. Venous thrombosis prophylaxis are not indicated.    Procedure Details: The patient was placed in the supine position on the operating room table where appropriate blood pressure and cardiac monitoring were initiated. The patient was prepped and draped in the usual sterile fashion for intraocular surgery. This included instillation of Betadine 5% onto the ocular surface followed by irrigation with balance salt solution a minute or two later. A lid speculum was placed and the operating microscope was positioned. One paracentesis stab incision was made to the left of the planned cataract incision with a 15-degree supersharp blade. 1 ml of preservative free lidocaine was injected into the AC. Viscoat was used to replace the aqueous humor. A temporal clear corneal wound was fashioned beginning at the limbus with a 2.4 mm keratome, extending 2 mm into clear cornea before entering the anterior chamber. A continuous tear circular capsulorhexis of approximately 5 mm in diameter was performed, while doing the capsulorhexis the zonules were noticed to be weak. Hydrodissection was performed using a Peralta canula. The endothelium was coated with viscoat again. Using the Ozil handpiece on the [employer] Lens Removal System, the nucleus was emulsified and aspirated using a divide-and-conquer technique. Residual cortex was removed from the eye with the irrigation/aspiration bimanually. During Irrigation/aspiration a nasal area of zonular dehiscence was noticed extending about 4 clock hours nasally. A size 12 CTR was used. ProVisc was used to inflate the capsular bag. The lens implant was inspected  and found to be free of visible defects. The lens used was an Johnie model SY60WF, power 23.50 diopter intraocular lens. The lens was inserted into the capsular bag using the [place] insertion mechanism. The lens was centered with a Temple hook. Residual viscoelastic was removed from the eye with the irrigation/aspiration instrument. The wounds were checked and found to be watertight. The lid speculum was removed and the eye was dressed with Maxitrol ointment, eye pad, tape, and shield. The patient tolerated the procedure well, and there were no complications.    Evidence of Infection: No   Complications:  None; patient tolerated the procedure well.    Disposition: PACU - hemodynamically stable.  Condition: stable             Additional Details:Area of zonular dehiscence nasally about 4 clock hours. CTR was used.    Attending Attestation: I performed the procedure.    Rocío Watts  Phone Number: [phone]

## 2025-06-25 NOTE — DISCHARGE INSTRUCTIONS
Start the following eye drops in the operative eye:   Keep eye patch and shield on for 4 hours, then can remove eye patch but must leave clear plastic shield on, can take it off to start drops today    Postop instructions:  Prednisolone acetate drops:  4 times/day     Moxifloxacin drops:  4 times/day    Ketorolac drops   4 times/day      Sleep with shield at night for 7 days  No eye rubbing  May shower and wash face but no water inside surgery eye  No lifting any weight above 10lbs  Patient to resume home medications.   Please call immediately if you develop any redness, pain, decreased vision, flashes, floaters.   Office phone (after hours): [phone]   Hospital : [phone] (call this number if unable to reach someone on the phone above) then ask for ophthalmologist on call.        Follow up tomorrow with Dr. Stefanie Ray 10:15

## 2025-06-25 NOTE — BRIEF OP NOTE
Date: 2025  OR Location: [facility] OR    Name: Tomasa Gutierres, : 1965, Age: 59 y.o., MRN: [id number], Sex: female    Diagnosis  Pre-op Diagnosis      * Combined forms of age-related cataract of both eyes [H25.813] Post-op Diagnosis     * Combined forms of age-related cataract of both eyes [H25.813]     Procedures  LEFT CATARACT EXTRACTION WITH INTRAOCULAR LENS IMPLANTATION  58116 - CT XCAPSL CTRC RMVL INSJ IO LENS PROSTH W/O ECP      Surgeons      * Rocío Watts - Primary    Resident/Fellow/Other Assistant:  Surgeons and Role:  * No surgeons found with a matching role *    Staff:   Christopherulator: Shreya  Surgical Assistant: Josseline    Anesthesia Staff: Anesthesiologist: Jalen Kendall MD  CRNA: ADRYAN Ellison-CRNA    Procedure Summary  Anesthesia: Monitor Anesthesia Care  ASA: III  Estimated Blood Loss: 0 mL  Intra-op Medications: Administrations occurring from 1015 to 1110 on 25:  * No intraprocedure medications in log *           Anesthesia Record               Intraprocedure I/O Totals       None           Specimen: No specimens collected               Findings: cataract left eye, zonular weakness nasally 7:00-11:00    Complications:  None; patient tolerated the procedure well.     Disposition: PACU - hemodynamically stable.  Condition: stable  Specimens Collected: No specimens collected  Attending Attestation:     Rocío Watts  Phone Number: [phone]

## 2025-06-25 NOTE — H&P
"History Of Present Illness  Tomasa Gutierres is a 59 y.o. female presenting with blurry vision here for Cataract extraction with intraocular lens (IOL) implantation left eye.     Past Medical History  Medical History[1]    Surgical History  Surgical History[2]     Social History  She reports that she has been smoking cigarettes. She has a 7.5 pack-year smoking history. She has never used smokeless tobacco. She reports that she does not currently use alcohol. She reports that she does not use drugs.    Family History  Family History[3]     Allergies  Azithromycin, Orange, and Orange juice    Review of Systems   All other systems reviewed and are negative.       Physical Exam   Physical exam:  Head: normocephalic  Eyes: see clinic note  Respiratory: per anesthesia  Cardiovascular: per anesthesia  Neuro: alert and interactive     Last Recorded Vitals  Blood pressure 133/77, pulse 83, temperature 36.8 °C (98.2 °F), temperature source Temporal, resp. rate 18, height 1.702 m (5' 7\"), weight 67.1 kg (147 lb 14.9 oz), SpO2 97%.         Assessment & Plan  Combined forms of age-related cataract of both eyes      Tomasa Gutierres is a 59 y.o. female presenting with blurry vision here for Cataract extraction with intraocular lens (IOL) implantation left eye.    Proceed w plan as above        Rosa Maria Mooney MD         [1]   Past Medical History:  Diagnosis Date    Amblyopia     Arrhythmia 12/24    Atrial fibrillation (Multi)     Cataract     Closed fracture of upper end of humerus 11/03/2015    Dry eyes     DVT (deep venous thrombosis) (Multi)     tx with Coumadin    Elevated liver enzymes 12/13/2023    Intermittent exotropia of right eye     Migraines     Pre-hypertension 12/13/2023    Scoliosis     Shoulder fracture, left     Stroke (Multi)     CVA/TIA    Stroke (Multi) 12/2023    left PCA territory infarct   [2]   Past Surgical History:  Procedure Laterality Date    CATARACT EXTRACTION      06/11/25- OD - DR. DURAN,    ECTOPIC " PREGNANCY SURGERY      EYE SURGERY Right     x2    FRACTURE SURGERY      left wrist    HERNIA REPAIR      MR HEAD ANGIO WO IV CONTRAST  12/14/2023    MR HEAD ANGIO WO IV CONTRAST 12/14/2023 [facility]    MR NECK ANGIO WO IV CONTRAST  12/14/2023    MR NECK ANGIO WO IV CONTRAST 12/14/2023 [facility]    OTHER SURGICAL HISTORY     [3]   Family History  Problem Relation Name Age of Onset    Cancer Mother Nicolasa Gutierres     Heart attack Father          dies at the age of 50's    No Known Problems Sister      Other (borderline diabetic) Maternal Grandfather      Heart attack Paternal Grandmother      Stroke Paternal Grandfather Tomasa Gutierres     No Known Problems Son      Glaucoma Neg Hx      Macular degeneration Neg Hx

## 2025-06-25 NOTE — ANESTHESIA POSTPROCEDURE EVALUATION
Patient: Tomasa Gutierres    Procedure Summary       Date: 06/25/25 Room / Location: [facility] / [facility]    Anesthesia Start: 1002 Anesthesia Stop:     Procedure: LEFT CATARACT EXTRACTION WITH INTRAOCULAR LENS IMPLANTATION (Left) Diagnosis:       Combined forms of age-related cataract of both eyes      (Combined forms of age-related cataract of both eyes [H25.813])    Surgeons: Rocío Watts MD Responsible Provider: Jalen Kendall MD    Anesthesia Type: MAC ASA Status: 3            Anesthesia Type: MAC    Vitals Value Taken Time   /74 06/25/25 10:34   Temp 36.3 °C (97.3 °F) 06/25/25 10:34   Pulse 79 06/25/25 10:35   Resp 12 06/25/25 10:34   SpO2 98 % 06/25/25 10:35   Vitals shown include unfiled device data.    Anesthesia Post Evaluation    Patient location during evaluation: PACU  Patient participation: complete - patient participated  Level of consciousness: awake and alert  Pain management: adequate  Airway patency: patent  Cardiovascular status: acceptable  Respiratory status: acceptable  Hydration status: acceptable  Postoperative Nausea and Vomiting: none      There were no known notable events for this encounter.

## 2025-06-25 NOTE — ANESTHESIA PREPROCEDURE EVALUATION
Patient: Tomasa Gutierres    Procedure Information       Date/Time: 06/25/25 1015    Procedure: LEFT CATARACT EXTRACTION WITH INTRAOCULAR LENS IMPLANTATION (Left)    Location: [facility] / [facility]    Surgeons: Rocío Watts MD            Relevant Problems   Cardiac   (+) Atrial fibrillation (Multi)   (+) New onset a-fib (Multi)   (+) Paroxysmal atrial fibrillation (Multi)   (+) Persistent atrial fibrillation (Multi)   (+) Primary hypertension      Neuro   (+) Anxiety and depression   (+) History of left PCA stroke      /Renal   (+) Hyponatremia      HEENT   (+) Acute non-recurrent frontal sinusitis       Clinical information reviewed:    Allergies  Meds  Problems              NPO Detail:  No data recorded     Physical Exam    Airway  Mallampati: II  TM distance: >3 FB  Neck ROM: full  Mouth opening: 3 or more finger widths     Cardiovascular - normal exam  Rhythm: regular  Rate: normal     Dental - normal exam     Pulmonary - normal exam   Abdominal            Anesthesia Plan    History of general anesthesia?: yes  History of complications of general anesthesia?: no    ASA 3     MAC     The patient is a current smoker.  Patient was previously instructed to abstain from smoking on day of procedure.  Patient did not smoke on day of procedure.    intravenous induction   Postoperative pain plan includes opioids.  Anesthetic plan and risks discussed with patient.    Plan discussed with CRNA.

## 2025-06-26 ENCOUNTER — APPOINTMENT (OUTPATIENT)
Dept: OPHTHALMOLOGY | Facility: CLINIC | Age: 60
End: 2025-06-26
Payer: COMMERCIAL

## 2025-06-26 ENCOUNTER — OFFICE VISIT (OUTPATIENT)
Dept: OPHTHALMOLOGY | Facility: CLINIC | Age: 60
End: 2025-06-26
Payer: COMMERCIAL

## 2025-06-26 DIAGNOSIS — Z96.1 PSEUDOPHAKIA OF BOTH EYES: Primary | ICD-10-CM

## 2025-06-26 PROCEDURE — 99024 POSTOP FOLLOW-UP VISIT: CPT | Performed by: OPHTHALMOLOGY

## 2025-06-26 ASSESSMENT — VISUAL ACUITY: METHOD: SNELLEN - LINEAR

## 2025-06-26 ASSESSMENT — ENCOUNTER SYMPTOMS: EYES NEGATIVE: 1

## 2025-06-26 ASSESSMENT — SLIT LAMP EXAM - LIDS
COMMENTS: NORMAL
COMMENTS: NORMAL

## 2025-06-26 ASSESSMENT — EXTERNAL EXAM - RIGHT EYE: OD_EXAM: NORMAL

## 2025-06-26 ASSESSMENT — TONOMETRY
IOP_METHOD: TONOPEN
OS_IOP_MMHG: 12

## 2025-06-26 NOTE — PATIENT INSTRUCTIONS
Drops:  Tan top 4 times daily for 1 week then stop  Pink and grey top 4 times daily for 1 week then twice daily for 2 weeks then once daily for 1 week then stop

## 2025-06-29 NOTE — PROGRESS NOTES
Assessment/Plan   Diagnoses and all orders for this visit:  Pseudophakia of both eyes    POD#1, post CEIOL needed CTR for nasal Zonular weakness/dehiscence. Lens looks centered.    POW#1 post CEIOL OD, doing well.    Plan:  Left eye:  Keep Pred QID with weekly taper  Ketorolac QID for 1 week then BID until empty  Moxi QID until empty.    Right eye:  Keep Pred taper then stop.    Return precautions  Patient can't follow up in 1 week (going out of town)  See in 3 weeks.

## 2025-07-03 ENCOUNTER — APPOINTMENT (OUTPATIENT)
Dept: OPHTHALMOLOGY | Facility: CLINIC | Age: 60
End: 2025-07-03
Payer: COMMERCIAL

## 2025-07-18 ENCOUNTER — APPOINTMENT (OUTPATIENT)
Dept: OPHTHALMOLOGY | Facility: CLINIC | Age: 60
End: 2025-07-18
Payer: COMMERCIAL

## 2025-07-18 DIAGNOSIS — Z96.1 PSEUDOPHAKIA OF BOTH EYES: Primary | ICD-10-CM

## 2025-07-18 PROCEDURE — 99024 POSTOP FOLLOW-UP VISIT: CPT | Performed by: OPHTHALMOLOGY

## 2025-07-18 ASSESSMENT — TONOMETRY
IOP_METHOD: GOLDMANN APPLANATION
OS_IOP_MMHG: 10
OD_IOP_MMHG: 11

## 2025-07-18 ASSESSMENT — ENCOUNTER SYMPTOMS
CONSTITUTIONAL NEGATIVE: 0
RESPIRATORY NEGATIVE: 0
GASTROINTESTINAL NEGATIVE: 0
PSYCHIATRIC NEGATIVE: 0
CARDIOVASCULAR NEGATIVE: 0
ALLERGIC/IMMUNOLOGIC NEGATIVE: 0
EYES NEGATIVE: 1
ENDOCRINE NEGATIVE: 0
HEMATOLOGIC/LYMPHATIC NEGATIVE: 0
NEUROLOGICAL NEGATIVE: 0
MUSCULOSKELETAL NEGATIVE: 0

## 2025-07-18 ASSESSMENT — REFRACTION_MANIFEST
OS_AXIS: 172
OS_SPHERE: +0.50
OS_CYLINDER: -1.25
OD_CYLINDER: -1.00
METHOD_AUTOREFRACTION: 1
OD_SPHERE: +0.75
OD_AXIS: 171

## 2025-07-18 ASSESSMENT — VISUAL ACUITY
OS_PH_SC: 20/20-2
OS_SC: 20/25-2
METHOD: SNELLEN - LINEAR
OD_SC: 20/30-2

## 2025-07-18 ASSESSMENT — EXTERNAL EXAM - RIGHT EYE: OD_EXAM: NORMAL

## 2025-07-18 ASSESSMENT — SLIT LAMP EXAM - LIDS
COMMENTS: NORMAL
COMMENTS: NORMAL

## 2025-07-18 NOTE — PROGRESS NOTES
Assessment/Plan   Diagnoses and all orders for this visit:  Pseudophakia of both eyes    POW#2, post CEIOL needed CTR for nasal Zonular weakness/dehiscence. Lens looks centered. Doing well    POW#4 post CEIOL OD, doing well.    Plan:  Drop instructions given.  See in 3 weeks for refraction.

## 2025-07-24 ENCOUNTER — APPOINTMENT (OUTPATIENT)
Dept: OPHTHALMOLOGY | Facility: CLINIC | Age: 60
End: 2025-07-24
Payer: COMMERCIAL

## 2025-07-31 ENCOUNTER — APPOINTMENT (OUTPATIENT)
Dept: OPHTHALMOLOGY | Facility: CLINIC | Age: 60
End: 2025-07-31
Payer: COMMERCIAL

## 2025-08-01 DIAGNOSIS — E78.5 DYSLIPIDEMIA: ICD-10-CM

## 2025-08-01 RX ORDER — ATORVASTATIN CALCIUM 40 MG/1
40 TABLET, FILM COATED ORAL DAILY
Qty: 90 TABLET | Refills: 3 | Status: SHIPPED | OUTPATIENT
Start: 2025-08-01

## 2025-08-01 NOTE — TELEPHONE ENCOUNTER
Patient called in and states that she needed a refill on Atorvastatin 40 mg sent to [facility].

## 2025-08-02 DIAGNOSIS — I10 PRIMARY HYPERTENSION: ICD-10-CM

## 2025-08-04 RX ORDER — SPIRONOLACTONE 25 MG/1
25 TABLET ORAL DAILY
Qty: 90 TABLET | Refills: 3 | Status: SHIPPED | OUTPATIENT
Start: 2025-08-04

## 2025-08-07 ENCOUNTER — APPOINTMENT (OUTPATIENT)
Dept: OPHTHALMOLOGY | Facility: CLINIC | Age: 60
End: 2025-08-07
Payer: COMMERCIAL

## 2025-08-07 DIAGNOSIS — Z96.1 PSEUDOPHAKIA OF BOTH EYES: Primary | ICD-10-CM

## 2025-08-07 PROCEDURE — 99024 POSTOP FOLLOW-UP VISIT: CPT | Performed by: OPHTHALMOLOGY

## 2025-08-07 ASSESSMENT — VISUAL ACUITY
OD_SC+: +2
OS_SC: 20/25
METHOD: SNELLEN - LINEAR
OS_SC+: -2
OD_SC: 20/25

## 2025-08-07 ASSESSMENT — REFRACTION_MANIFEST
OS_CYLINDER: -1.75
OD_CYLINDER: -1.50
OS_ADD: +2.75
OD_SPHERE: +0.75
OD_ADD: +2.75
OS_SPHERE: +0.50
OS_SPHERE: +0.25
OS_CYLINDER: -1.50
OD_CYLINDER: -1.25
OD_AXIS: 171
OD_SPHERE: +0.50
OS_AXIS: 173
OD_AXIS: 171
OS_AXIS: 175
METHOD_AUTOREFRACTION: 1

## 2025-08-07 ASSESSMENT — ENCOUNTER SYMPTOMS
CONSTITUTIONAL NEGATIVE: 0
EYES NEGATIVE: 1
CARDIOVASCULAR NEGATIVE: 0
PSYCHIATRIC NEGATIVE: 0
HEMATOLOGIC/LYMPHATIC NEGATIVE: 0
ENDOCRINE NEGATIVE: 0
MUSCULOSKELETAL NEGATIVE: 0
RESPIRATORY NEGATIVE: 0
NEUROLOGICAL NEGATIVE: 0
ALLERGIC/IMMUNOLOGIC NEGATIVE: 0
GASTROINTESTINAL NEGATIVE: 0

## 2025-08-07 ASSESSMENT — CONF VISUAL FIELD
OD_NORMAL: 1
OS_NORMAL: 1
OS_INFERIOR_NASAL_RESTRICTION: 0
OS_SUPERIOR_NASAL_RESTRICTION: 0
OD_SUPERIOR_NASAL_RESTRICTION: 0
OD_INFERIOR_TEMPORAL_RESTRICTION: 0
OS_SUPERIOR_TEMPORAL_RESTRICTION: 0
OD_SUPERIOR_TEMPORAL_RESTRICTION: 0
METHOD: COUNTING FINGERS
OS_INFERIOR_TEMPORAL_RESTRICTION: 0
OD_INFERIOR_NASAL_RESTRICTION: 0

## 2025-08-07 ASSESSMENT — TONOMETRY
OD_IOP_MMHG: 08
IOP_METHOD: TONOPEN
OS_IOP_MMHG: 09

## 2025-08-07 ASSESSMENT — SLIT LAMP EXAM - LIDS
COMMENTS: NORMAL
COMMENTS: NORMAL

## 2025-08-07 ASSESSMENT — EXTERNAL EXAM - RIGHT EYE: OD_EXAM: NORMAL

## 2025-10-30 ENCOUNTER — APPOINTMENT (OUTPATIENT)
Dept: CARDIOLOGY | Facility: CLINIC | Age: 60
End: 2025-10-30
Payer: COMMERCIAL

## 2026-05-07 ENCOUNTER — APPOINTMENT (OUTPATIENT)
Dept: OPHTHALMOLOGY | Facility: CLINIC | Age: 61
End: 2026-05-07
Payer: COMMERCIAL

## (undated) DEVICE — PAD, EYE, OVAL, 1 5/8 X 2 5/8 IN, STERILE

## (undated) DEVICE — DRESSING, TRANSPARENT, TEGADERM, 2-3/8 X 2-3/4 IN

## (undated) DEVICE — NEEDLE, FILTER, BLUNT, 5 MIC, 18 G X 1.5 IN

## (undated) DEVICE — SYRINGE, 3 CC, LUER LOCK